# Patient Record
Sex: FEMALE | Race: WHITE | NOT HISPANIC OR LATINO | Employment: OTHER | URBAN - METROPOLITAN AREA
[De-identification: names, ages, dates, MRNs, and addresses within clinical notes are randomized per-mention and may not be internally consistent; named-entity substitution may affect disease eponyms.]

---

## 2018-09-10 ENCOUNTER — TELEPHONE (OUTPATIENT)
Dept: PULMONOLOGY | Facility: CLINIC | Age: 78
End: 2018-09-10

## 2018-09-10 NOTE — TELEPHONE ENCOUNTER
Pt scheduled for next day appt with MD .    ----- Message from Brittany Mcguire sent at 9/10/2018 12:40 PM CDT -----  Type: Needs appointment    Who Called:  Patient  Best Call Back Number: [phone]  Additional Information: Patient stated she use to see doctor at previous location/would like to see doctor again for COPD/no appointment showing available until December/stated needs to be seen sooner due to having issues/please call patient back to schedule or advise.

## 2018-09-11 ENCOUNTER — OFFICE VISIT (OUTPATIENT)
Dept: PULMONOLOGY | Facility: CLINIC | Age: 78
End: 2018-09-11
Payer: MEDICARE

## 2018-09-11 VITALS
HEIGHT: 62 IN | BODY MASS INDEX: 26.78 KG/M2 | OXYGEN SATURATION: 97 % | HEART RATE: 56 BPM | SYSTOLIC BLOOD PRESSURE: 164 MMHG | WEIGHT: 145.5 LBS | DIASTOLIC BLOOD PRESSURE: 75 MMHG

## 2018-09-11 DIAGNOSIS — J44.9 CHRONIC OBSTRUCTIVE PULMONARY DISEASE, UNSPECIFIED COPD TYPE: ICD-10-CM

## 2018-09-11 DIAGNOSIS — J44.1 COPD EXACERBATION: Primary | ICD-10-CM

## 2018-09-11 DIAGNOSIS — R06.09 DOE (DYSPNEA ON EXERTION): ICD-10-CM

## 2018-09-11 PROCEDURE — 99203 OFFICE O/P NEW LOW 30 MIN: CPT | Mod: S$PBB,,, | Performed by: INTERNAL MEDICINE

## 2018-09-11 PROCEDURE — 99213 OFFICE O/P EST LOW 20 MIN: CPT | Mod: PBBFAC,PO | Performed by: INTERNAL MEDICINE

## 2018-09-11 PROCEDURE — 99999 PR PBB SHADOW E&M-EST. PATIENT-LVL III: CPT | Mod: PBBFAC,,, | Performed by: INTERNAL MEDICINE

## 2018-09-11 RX ORDER — FUROSEMIDE 20 MG/1
20 TABLET ORAL DAILY
Status: ON HOLD | COMMUNITY
Start: 2018-09-04 | End: 2019-09-05 | Stop reason: HOSPADM

## 2018-09-11 RX ORDER — PANTOPRAZOLE SODIUM 40 MG/1
TABLET, DELAYED RELEASE ORAL
Refills: 3 | Status: ON HOLD | COMMUNITY
Start: 2018-08-17 | End: 2019-11-04 | Stop reason: HOSPADM

## 2018-09-11 RX ORDER — BUDESONIDE AND FORMOTEROL FUMARATE DIHYDRATE 160; 4.5 UG/1; UG/1
2 AEROSOL RESPIRATORY (INHALATION) EVERY 12 HOURS
Qty: 1 INHALER | Refills: 11 | Status: SHIPPED | OUTPATIENT
Start: 2018-09-11 | End: 2019-02-18 | Stop reason: SDUPTHER

## 2018-09-11 RX ORDER — LISINOPRIL 5 MG/1
5 TABLET ORAL DAILY
Status: ON HOLD | COMMUNITY
Start: 2018-09-07 | End: 2019-11-04 | Stop reason: HOSPADM

## 2018-09-11 RX ORDER — PRAVASTATIN SODIUM 20 MG/1
20 TABLET ORAL DAILY
Status: ON HOLD | COMMUNITY
Start: 2018-09-04 | End: 2019-11-04 | Stop reason: HOSPADM

## 2018-09-11 RX ORDER — ALBUTEROL SULFATE 0.83 MG/ML
SOLUTION RESPIRATORY (INHALATION)
Refills: 1 | COMMUNITY
Start: 2018-07-02 | End: 2018-09-11 | Stop reason: SDUPTHER

## 2018-09-11 RX ORDER — PREDNISONE 20 MG/1
TABLET ORAL
Qty: 12 TABLET | Refills: 0 | Status: SHIPPED | OUTPATIENT
Start: 2018-09-11 | End: 2018-09-11 | Stop reason: SDUPTHER

## 2018-09-11 RX ORDER — ALBUTEROL SULFATE 90 UG/1
1-2 AEROSOL, METERED RESPIRATORY (INHALATION) EVERY 4 HOURS PRN
Qty: 1 INHALER | Refills: 11 | Status: SHIPPED | OUTPATIENT
Start: 2018-09-11 | End: 2019-02-18 | Stop reason: SDUPTHER

## 2018-09-11 RX ORDER — LEVOTHYROXINE SODIUM 75 UG/1
75 TABLET ORAL
Status: ON HOLD | COMMUNITY
Start: 2018-09-10 | End: 2019-06-04 | Stop reason: HOSPADM

## 2018-09-11 RX ORDER — SOTALOL HYDROCHLORIDE 80 MG/1
TABLET ORAL
Refills: 1 | Status: ON HOLD | COMMUNITY
Start: 2018-07-14 | End: 2019-09-05 | Stop reason: SDUPTHER

## 2018-09-11 RX ORDER — ALBUTEROL SULFATE 0.83 MG/ML
2.5 SOLUTION RESPIRATORY (INHALATION) EVERY 4 HOURS PRN
Qty: 120 EACH | Refills: 11 | Status: SHIPPED | OUTPATIENT
Start: 2018-09-11 | End: 2019-02-18 | Stop reason: SDUPTHER

## 2018-09-11 RX ORDER — PREDNISONE 20 MG/1
TABLET ORAL
Qty: 12 TABLET | Refills: 2 | Status: SHIPPED | OUTPATIENT
Start: 2018-09-11 | End: 2019-05-07

## 2018-09-11 RX ORDER — TRAMADOL HYDROCHLORIDE 50 MG/1
50 TABLET ORAL EVERY 12 HOURS PRN
Refills: 3 | COMMUNITY
Start: 2018-08-23 | End: 2019-10-23 | Stop reason: SDUPTHER

## 2018-09-11 RX ORDER — ATENOLOL 50 MG/1
50 TABLET ORAL DAILY
Refills: 1 | Status: ON HOLD | COMMUNITY
Start: 2018-07-14 | End: 2019-09-05 | Stop reason: HOSPADM

## 2018-09-11 RX ORDER — ALBUTEROL SULFATE 90 UG/1
AEROSOL, METERED RESPIRATORY (INHALATION)
COMMUNITY
End: 2018-09-11 | Stop reason: SDUPTHER

## 2018-09-11 RX ORDER — CLONAZEPAM 0.5 MG/1
TABLET ORAL
Refills: 3 | COMMUNITY
Start: 2018-08-23

## 2018-09-11 NOTE — PATIENT INSTRUCTIONS
Copd    Use prednisone- daily for 3 days, may repeat for wheezes.     Use symbicort 2 twice daily , may use regular       Use albuterol inhaler or nebulizer as needed.      Check 02 level and breathing test      Try spiriva 2 daily in a month. Do once breathing good from steroids.

## 2018-09-11 NOTE — PROGRESS NOTES
"2018    Rosita Almodovar  Seen in past in     Chief Complaint   Patient presents with    Shortness of Breath    COPD       HPI:   Quite smoking 2014 after 54 years- pt initially seen as stopped breathing - spent 4 days total- had angiogram at that time.      Breathing had been stable but began worsening - developed cough productive nothing, dry, having wheezes now - had in past but stopped with smoking cessation.  .        The chief compliant  problem is varies with instablilty at time   PFSH:  History reviewed. No pertinent past medical history.      History reviewed. No pertinent surgical history.  Social History     Tobacco Use    Smoking status: Former Smoker     Packs/day: 1.00     Years: 54.00     Pack years: 54.00     Types: Cigarettes     Last attempt to quit:      Years since quittin.6    Smokeless tobacco: Never Used   Substance Use Topics    Alcohol use: No     Frequency: Never    Drug use: No     History reviewed. No pertinent family history.  Review of patient's allergies indicates:  No Known Allergies    Performance Status:The patient's activity level is housebound activities.      Review of Systems:  a review of eleven systems covering constitutional, Eye, HEENT, Psych, Respiratory, Cardiac, GI, , Musculoskeletal, Endocrine, Dermatologic was negative except for pertinent findings as listed ABOVE and below:  pertinent positive as above, rest is good , [place], sees cardiology.     Exam:Comprehensive exam done. BP (!) 164/75 (BP Location: Left arm, Patient Position: Sitting)   Pulse (!) 56   Ht 5' 1.5" (1.562 m)   Wt 66 kg (145 lb 8.1 oz)   SpO2 97% Comment: on room air  BMI 27.05 kg/m²   Exam included Vitals as listed, and patient's appearance and affect and alertness and mood, oral exam for yeast and hygiene and pharynx lesions and Mallapatti (M) score, neck with inspection for jvd and masses and thyroid abnormalities and lymph nodes (supraclavicular and infraclavicular nodes " and axillary also examined and noted if abn), chest exam included symmetry and effort and fremitus and percussion and auscultation, cardiac exam included rhythm and gallops and murmur and rubs and jvd and edema, abdominal exam for mass and hepatosplenomegaly and tenderness and hernias and bowel sounds, Musculoskeletal exam with muscle tone and posture and mobility/gait and  strength, and skin for rashes and cyanosis and pallor and turgor, extremity for clubbing.  Findings were normal except for pertinent findings listed below:  M1, chest is symmetric, no distress, normal percussion, normal fremitus and good normal breath sounds  No edema    Radiographs (ct chest and cxr) reviewed: view by direct vision  Copd changes July 2018.prominent vasc markings.    Labs was not done        PFT will be done and results to be reviewed       Plan:  Clinical impression is apparently straight forward and impression with management as below.    Rosita was seen today for shortness of breath and copd.    Diagnoses and all orders for this visit:    COPD exacerbation  -     Discontinue: predniSONE (DELTASONE) 20 MG tablet; One daily for 3 days and repeat for flare of lung symptoms as intructed  -     budesonide-formoterol 160-4.5 mcg (SYMBICORT) 160-4.5 mcg/actuation HFAA; Inhale 2 puffs into the lungs every 12 (twelve) hours. Controller  -     Complete PFT with bronchodilator; Future  -     Six Minute Walk Test to qualify for Home Oxygen; Future  -     PULSE OXIMETRY OVERNIGHT  -     albuterol (PROVENTIL) 2.5 mg /3 mL (0.083 %) nebulizer solution; Take 3 mLs (2.5 mg total) by nebulization every 4 (four) hours as needed for Wheezing. Rescue  -     albuterol (VENTOLIN HFA) 90 mcg/actuation inhaler; Inhale 1-2 puffs into the lungs every 4 (four) hours as needed for Wheezing. Rescue  -     predniSONE (DELTASONE) 20 MG tablet; One daily for 3 days and repeat for flare of lung symptoms as intructed  -     tiotropium bromide 2.5  mcg/actuation Mist; Inhale 5 mcg into the lungs once daily. Controller    RIVAS (dyspnea on exertion)  -     Discontinue: predniSONE (DELTASONE) 20 MG tablet; One daily for 3 days and repeat for flare of lung symptoms as intructed  -     budesonide-formoterol 160-4.5 mcg (SYMBICORT) 160-4.5 mcg/actuation HFAA; Inhale 2 puffs into the lungs every 12 (twelve) hours. Controller  -     Complete PFT with bronchodilator; Future  -     Six Minute Walk Test to qualify for Home Oxygen; Future  -     PULSE OXIMETRY OVERNIGHT  -     predniSONE (DELTASONE) 20 MG tablet; One daily for 3 days and repeat for flare of lung symptoms as intructed    Chronic obstructive pulmonary disease, unspecified COPD type   -     PULSE OXIMETRY OVERNIGHT  -     albuterol (PROVENTIL) 2.5 mg /3 mL (0.083 %) nebulizer solution; Take 3 mLs (2.5 mg total) by nebulization every 4 (four) hours as needed for Wheezing. Rescue  -     albuterol (VENTOLIN HFA) 90 mcg/actuation inhaler; Inhale 1-2 puffs into the lungs every 4 (four) hours as needed for Wheezing. Rescue  -     tiotropium bromide 2.5 mcg/actuation Mist; Inhale 5 mcg into the lungs once daily. Controller        Follow-up in about 3 months (around 12/11/2018).    Discussed with patient above for education the following:      Patient Instructions   Copd    Use prednisone- daily for 3 days, may repeat for wheezes.     Use symbicort 2 twice daily , may use regular       Use albuterol inhaler or nebulizer as needed.      Check 02 level and breathing test      Try spiriva 2 daily in a month. Do once breathing good from steroids.

## 2018-09-24 DIAGNOSIS — J44.1 COPD EXACERBATION: Primary | ICD-10-CM

## 2018-09-24 DIAGNOSIS — J44.9 CHRONIC OBSTRUCTIVE PULMONARY DISEASE, UNSPECIFIED COPD TYPE: ICD-10-CM

## 2019-02-18 ENCOUNTER — OFFICE VISIT (OUTPATIENT)
Dept: PULMONOLOGY | Facility: CLINIC | Age: 79
End: 2019-02-18
Payer: MEDICARE

## 2019-02-18 VITALS
DIASTOLIC BLOOD PRESSURE: 61 MMHG | SYSTOLIC BLOOD PRESSURE: 125 MMHG | HEIGHT: 62 IN | BODY MASS INDEX: 26.05 KG/M2 | WEIGHT: 141.56 LBS | OXYGEN SATURATION: 96 % | HEART RATE: 63 BPM

## 2019-02-18 DIAGNOSIS — J44.9 CHRONIC OBSTRUCTIVE PULMONARY DISEASE, UNSPECIFIED COPD TYPE: ICD-10-CM

## 2019-02-18 DIAGNOSIS — J44.1 COPD EXACERBATION: Primary | ICD-10-CM

## 2019-02-18 DIAGNOSIS — R06.09 DOE (DYSPNEA ON EXERTION): ICD-10-CM

## 2019-02-18 PROCEDURE — 99999 PR PBB SHADOW E&M-EST. PATIENT-LVL IV: ICD-10-PCS | Mod: PBBFAC,,, | Performed by: INTERNAL MEDICINE

## 2019-02-18 PROCEDURE — 99999 PR PBB SHADOW E&M-EST. PATIENT-LVL IV: CPT | Mod: PBBFAC,,, | Performed by: INTERNAL MEDICINE

## 2019-02-18 PROCEDURE — 99214 OFFICE O/P EST MOD 30 MIN: CPT | Mod: PBBFAC,PO | Performed by: INTERNAL MEDICINE

## 2019-02-18 PROCEDURE — 99213 PR OFFICE/OUTPT VISIT, EST, LEVL III, 20-29 MIN: ICD-10-PCS | Mod: S$PBB,,, | Performed by: INTERNAL MEDICINE

## 2019-02-18 PROCEDURE — 99213 OFFICE O/P EST LOW 20 MIN: CPT | Mod: S$PBB,,, | Performed by: INTERNAL MEDICINE

## 2019-02-18 RX ORDER — ALBUTEROL SULFATE 0.83 MG/ML
2.5 SOLUTION RESPIRATORY (INHALATION) EVERY 4 HOURS PRN
Qty: 120 EACH | Refills: 11 | Status: SHIPPED | OUTPATIENT
Start: 2019-02-18 | End: 2019-05-14

## 2019-02-18 RX ORDER — CHOLECALCIFEROL (VITAMIN D3) 1250 MCG
50000 CAPSULE ORAL
Refills: 3 | Status: ON HOLD | COMMUNITY
Start: 2019-01-31 | End: 2019-11-04 | Stop reason: HOSPADM

## 2019-02-18 RX ORDER — BUDESONIDE AND FORMOTEROL FUMARATE DIHYDRATE 160; 4.5 UG/1; UG/1
2 AEROSOL RESPIRATORY (INHALATION) EVERY 12 HOURS
Qty: 1 INHALER | Refills: 11 | Status: SHIPPED | OUTPATIENT
Start: 2019-02-18 | End: 2019-10-17

## 2019-02-18 RX ORDER — ALBUTEROL SULFATE 90 UG/1
1-2 AEROSOL, METERED RESPIRATORY (INHALATION) EVERY 4 HOURS PRN
Qty: 1 INHALER | Refills: 11 | Status: SHIPPED | OUTPATIENT
Start: 2019-02-18 | End: 2019-05-14

## 2019-02-18 NOTE — PATIENT INSTRUCTIONS
Copd is mild range with lung capacity 63-69%     symbicort once or twice daily and nebulizer as needed/bedtime.    May use prednisone if more cough/wheezes.      X rays last yr were good- should do chest xray if lungs worsen.    Stress test for heart set up for April- re check one yr.

## 2019-02-18 NOTE — PROGRESS NOTES
2019    Rositalino Iqbal Scooby  Seen in past in     Chief Complaint   Patient presents with    Follow-up     3 month     COPD       2019 - seen in [facility] yrs ago.  Off esmokes, doing well , uses symbicort q am, uses nebulizer prn and nightly - no extra.  Took prednisone only once.  Used spiriva - not very effective.    Quite smoking 2014 after 54 years- pt initially seen as stopped breathing - spent 4 days total- had angiogram at that time.      Breathing had been stable but began worsening - developed cough productive nothing, dry, having wheezes now - had in past but stopped with smoking cessation.  .    Patient Instructions   Copd    Use prednisone- daily for 3 days, may repeat for wheezes.     Use symbicort 2 twice daily , may use regular       Use albuterol inhaler or nebulizer as needed.      Check 02 level and breathing test      Try spiriva 2 daily in a month. Do once breathing good from steroids.     The chief compliant  problem is varies with instablilty at time   PFSH:  History reviewed. No pertinent past medical history.      History reviewed. No pertinent surgical history.  Social History     Tobacco Use    Smoking status: Former Smoker     Packs/day: 1.00     Years: 54.00     Pack years: 54.00     Types: Cigarettes     Last attempt to quit:      Years since quittin.1    Smokeless tobacco: Never Used   Substance Use Topics    Alcohol use: No     Frequency: Never    Drug use: No     History reviewed. No pertinent family history.  Review of patient's allergies indicates:  No Known Allergies    Performance Status:The patient's activity level is housebound activities.      Review of Systems:  a review of eleven systems covering constitutional, Eye, HEENT, Psych, Respiratory, Cardiac, GI, , Musculoskeletal, Endocrine, Dermatologic was negative except for pertinent findings as listed ABOVE and below:  pertinent positive as above, rest is good , [place], sees cardiology.  "    Exam:Comprehensive exam done. /61 (BP Location: Right arm, Patient Position: Sitting)   Pulse 63   Ht 5' 1.5" (1.562 m)   Wt 64.2 kg (141 lb 8.6 oz)   SpO2 96% Comment: on room air  BMI 26.31 kg/m²   Exam included Vitals as listed, and patient's appearance and affect and alertness and mood, oral exam for yeast and hygiene and pharynx lesions and Mallapatti (M) score, neck with inspection for jvd and masses and thyroid abnormalities and lymph nodes (supraclavicular and infraclavicular nodes and axillary also examined and noted if abn), chest exam included symmetry and effort and fremitus and percussion and auscultation, cardiac exam included rhythm and gallops and murmur and rubs and jvd and edema, abdominal exam for mass and hepatosplenomegaly and tenderness and hernias and bowel sounds, Musculoskeletal exam with muscle tone and posture and mobility/gait and  strength, and skin for rashes and cyanosis and pallor and turgor, extremity for clubbing.  Findings were normal except for pertinent findings listed below:  M1, chest is symmetric, no distress, normal percussion, normal fremitus and good normal breath sounds  No edema    Radiographs (ct chest and cxr) reviewed: view by direct vision  Copd changes July 2018.prominent vasc markings. 9/27/2018 epa cxr with vague shadow lateral chest wall.       FINDINGS:  The lungs are clear, with normal appearance of pulmonary vasculature and no pleural effusion or pneumothorax.    The cardiac silhouette is normal in size.  The aorta is mildly ectatic, stable.  The hilar and mediastinal contours are unremarkable.    Degenerative disc changes and scoliosis redemonstrated in the spine.      Impression       1. No cardiopulmonary abnormality identified.  2. Stable chronic findings of the aorta and bony structures.      Electronically signed by: Javad Kendall II, MD  Date: 09/27/2018  Time: 09:40       Labs was not done        PFTfev1 63-69 % in 9/20/18,  rv " 138%      Plan:  Clinical impression is apparently straight forward and impression with management as below.    Rosita was seen today for follow-up and copd.    Diagnoses and all orders for this visit:    COPD exacerbation  -     budesonide-formoterol 160-4.5 mcg (SYMBICORT) 160-4.5 mcg/actuation HFAA; Inhale 2 puffs into the lungs every 12 (twelve) hours. Controller  -     albuterol (PROVENTIL) 2.5 mg /3 mL (0.083 %) nebulizer solution; Take 3 mLs (2.5 mg total) by nebulization every 4 (four) hours as needed for Wheezing. Rescue  -     albuterol (VENTOLIN HFA) 90 mcg/actuation inhaler; Inhale 1-2 puffs into the lungs every 4 (four) hours as needed for Wheezing. Rescue    RIVAS (dyspnea on exertion)  -     budesonide-formoterol 160-4.5 mcg (SYMBICORT) 160-4.5 mcg/actuation HFAA; Inhale 2 puffs into the lungs every 12 (twelve) hours. Controller    Chronic obstructive pulmonary disease, unspecified COPD type   -     albuterol (PROVENTIL) 2.5 mg /3 mL (0.083 %) nebulizer solution; Take 3 mLs (2.5 mg total) by nebulization every 4 (four) hours as needed for Wheezing. Rescue  -     albuterol (VENTOLIN HFA) 90 mcg/actuation inhaler; Inhale 1-2 puffs into the lungs every 4 (four) hours as needed for Wheezing. Rescue        Follow-up in about 1 year (around 2/18/2020), or if symptoms worsen or fail to improve.    Discussed with patient above for education the following:      Patient Instructions   Copd is mild range with lung capacity 63-69%     symbicort once or twice daily and nebulizer as needed/bedtime.    May use prednisone if more cough/wheezes.      X rays last yr were good- should do chest xray if lungs worsen.    Stress test for heart set up for April- re check one yr.     yes

## 2019-05-08 PROBLEM — E07.9 THYROID DISEASE: Chronic | Status: ACTIVE | Noted: 2019-05-08
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
## 2019-05-08 PROBLEM — J44.9 COPD (CHRONIC OBSTRUCTIVE PULMONARY DISEASE): Chronic | Status: ACTIVE | Noted: 2019-05-08

## 2019-05-08 PROBLEM — I63.9 CVA (CEREBRAL VASCULAR ACCIDENT): Status: ACTIVE | Noted: 2019-05-08

## 2019-05-08 PROBLEM — R63.0 ANOREXIA: Status: ACTIVE | Noted: 2019-05-08
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
## 2019-05-08 PROBLEM — F41.9 ANXIETY: Chronic | Status: ACTIVE | Noted: 2019-05-08

## 2019-05-08 PROBLEM — E86.0 DEHYDRATION: Status: ACTIVE | Noted: 2019-05-08

## 2019-05-08 PROBLEM — G45.9 TIA (TRANSIENT ISCHEMIC ATTACK): Status: ACTIVE | Noted: 2019-05-08

## 2019-05-08 PROBLEM — N17.9 AKI (ACUTE KIDNEY INJURY): Status: ACTIVE | Noted: 2019-05-08

## 2019-05-09 PROBLEM — R27.0 ATAXIA: Status: ACTIVE | Noted: 2019-05-09

## 2019-05-09 PROBLEM — J44.1 COPD EXACERBATION: Status: RESOLVED | Noted: 2018-09-11 | Resolved: 2019-05-09

## 2019-05-09 PROBLEM — G93.89 MASS OF BRAIN: Status: ACTIVE | Noted: 2019-05-09

## 2019-05-09 PROBLEM — R06.09 DOE (DYSPNEA ON EXERTION): Status: RESOLVED | Noted: 2018-09-11 | Resolved: 2019-05-09

## 2019-05-09 PROBLEM — E03.9 ACQUIRED HYPOTHYROIDISM: Status: ACTIVE | Noted: 2019-05-08

## 2019-05-09 PROBLEM — R47.1 DYSARTHRIA: Status: ACTIVE | Noted: 2019-05-09

## 2019-05-09 NOTE — PLAN OF CARE
[facility] admissions ONLY: Please call extension [phone] upon patient arrival to floor for Hospital Medicine admit team assignment and for additional admit orders for the patient. Do not page the attending, staff physician or Advanced Practice Provider with the patient on arrival (may not be in-house at the time of arrival). Call back or wait to leave beeper number when prompted.     (Physician in Lead of Transfers) /Regional Referral Center Note      Patients name/MRN: Rosita Almodovar/MRN [id number]     Referring Physician or Mid-Level provider giving report: Dr. Candi Ivy (Neurology)  Contact number: [phone]    Referral Facility: [facility]     Date/Time of Acceptance: 5/9/2019 6:58 PM    : Marilu Naidu MD     Accepting facility: [facility] Med/Surg    Reason for transfer request: No Oncology or radiation Oncology or Neurosurgery available at [place]. 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 Patient started using walker 1 month ago due to gait instability. Patient also reported 3 months of anorexia and weight loss of 22 lbs. Concern for CVA on admit so Neurology consulted for evaluation. Patient also noted to have mild YAKOV on admit 15/1.4 and hypotension with SBP in 90s. Patient started on IVFs and home medication of Lisinopril held. U/A negative for infection. 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 No other areas of acute infarction were appreciated. There was also noted to be a 1.4 cm rounded/ring like focus of abnormal signal cerebellum on the left with details and signal characteristics as discussed  "above not typical for recent hemorrhage or acute infarction. Repeat MRI of brain with contrast done today, 5/9 showed ring enhancement of previously demonstrated left cerebellar lesion and ring enhancement of demonstrated small lesion with diffusion restriction posteromedial left frontal cortex as seen on the prior MR images.  Given multiplicity of parenchymal lesions possibility of metastatic disease is raised. There was also appearance of the calvarial marrow is evident on the non-contrast T1 W images of 05/08/2019 as well as the current postcontrast images with more pronounced diminished T1 marrow signal right parietal region with overlying scalp soft tissue mass at the posterior right parietal convexity which demonstrates mild enhancement.  Focal T1 hypointense lesion also suspected at the C2 level.  As such the possibility of a marrow replacement disorder is raised including possibility of osseous metastases. Renal ultrasound done on this admit was unremarkable. Carotid US showed no significant stenosis. Concern for metastatic cancer and no Oncology, Rad/Onc or Neurosurgery available at [facility]. Case Discussed with Dr. Ivy and states patient is awake and alert and oriented x 3. Patient with no focal neuro deficits just mild dysarthria. No edema on MRI of brain so steroids not started.     VS: BP 95/49  Pulse 72  Temp 97.4 °F (36.3 °C) (Oral)   Resp 18   Ht 5' 1.5" (1.562 m)   Wt 59.3 kg (130 lb 11.7 oz)   SpO2 97% on room air  BMI 24.30 kg/m²    Past Medical/Surgical History: See EPIC    Meds: See EPIC    Labs: See EPIC    Imaging: See EPIC    To Do List upon arrival:     CT scan of chest/abdomen and pelvis for metastatic work-up   Likely biopsy once primary lesion found to establish diagnosis     Marilu Naidu MD  Senior Staff Physician  Department of Hospital Medicine  Patient [facility]/   [phone]  "

## 2019-05-10 ENCOUNTER — HOSPITAL ENCOUNTER (INPATIENT)
Facility: HOSPITAL | Age: 79
LOS: 1 days | Discharge: HOME | DRG: 055 | End: 2019-05-11
Attending: INTERNAL MEDICINE | Admitting: INTERNAL MEDICINE
Payer: MEDICARE

## 2019-05-10 DIAGNOSIS — N17.9 AKI (ACUTE KIDNEY INJURY): ICD-10-CM

## 2019-05-10 DIAGNOSIS — R27.0 ATAXIA: ICD-10-CM

## 2019-05-10 DIAGNOSIS — R91.8 ABNORMAL CT SCAN, LUNG: Primary | ICD-10-CM

## 2019-05-10 DIAGNOSIS — G93.89 CEREBELLAR MASS: ICD-10-CM

## 2019-05-10 DIAGNOSIS — I10 ESSENTIAL HYPERTENSION: ICD-10-CM

## 2019-05-10 DIAGNOSIS — R63.0 ANOREXIA: ICD-10-CM

## 2019-05-10 DIAGNOSIS — E03.9 ACQUIRED HYPOTHYROIDISM: ICD-10-CM

## 2019-05-10 DIAGNOSIS — G93.89 BRAIN MASS: ICD-10-CM

## 2019-05-10 PROBLEM — K21.9 GERD (GASTROESOPHAGEAL REFLUX DISEASE): Status: ACTIVE | Noted: 2019-05-10

## 2019-05-10 LAB
ALBUMIN SERPL BCP-MCNC: 2.8 G/DL (ref 3.5–5.2)
ALP SERPL-CCNC: 120 U/L (ref 55–135)
ALT SERPL W/O P-5'-P-CCNC: 6 U/L (ref 10–44)
ANION GAP SERPL CALC-SCNC: 11 MMOL/L (ref 8–16)
AST SERPL-CCNC: 28 U/L (ref 10–40)
BASOPHILS # BLD AUTO: 0.04 K/UL (ref 0–0.2)
BASOPHILS NFR BLD: 0.5 % (ref 0–1.9)
BILIRUB SERPL-MCNC: 0.3 MG/DL (ref 0.1–1)
BILIRUB UR QL STRIP: NEGATIVE
BUN SERPL-MCNC: 13 MG/DL (ref 8–23)
CALCIUM SERPL-MCNC: 9.2 MG/DL (ref 8.7–10.5)
CHLORIDE SERPL-SCNC: 102 MMOL/L (ref 95–110)
CLARITY UR REFRACT.AUTO: CLEAR
CO2 SERPL-SCNC: 25 MMOL/L (ref 23–29)
COLOR UR AUTO: NORMAL
CREAT SERPL-MCNC: 1 MG/DL (ref 0.5–1.4)
DIFFERENTIAL METHOD: ABNORMAL
EOSINOPHIL # BLD AUTO: 0.1 K/UL (ref 0–0.5)
EOSINOPHIL NFR BLD: 0.8 % (ref 0–8)
ERYTHROCYTE [DISTWIDTH] IN BLOOD BY AUTOMATED COUNT: 13.2 % (ref 11.5–14.5)
EST. GFR  (AFRICAN AMERICAN): >60 ML/MIN/1.73 M^2
EST. GFR  (NON AFRICAN AMERICAN): 54.5 ML/MIN/1.73 M^2
ESTIMATED AVG GLUCOSE: 117 MG/DL (ref 68–131)
GLUCOSE SERPL-MCNC: 74 MG/DL (ref 70–110)
GLUCOSE UR QL STRIP: NEGATIVE
HBA1C MFR BLD HPLC: 5.7 % (ref 4–5.6)
HCT VFR BLD AUTO: 36 % (ref 37–48.5)
HGB BLD-MCNC: 11.7 G/DL (ref 12–16)
HGB UR QL STRIP: NEGATIVE
HIV 1+2 AB+HIV1 P24 AG SERPL QL IA: NEGATIVE
IMM GRANULOCYTES # BLD AUTO: 0.06 K/UL (ref 0–0.04)
IMM GRANULOCYTES NFR BLD AUTO: 0.8 % (ref 0–0.5)
INR PPP: 1 (ref 0.8–1.2)
KETONES UR QL STRIP: NEGATIVE
LEUKOCYTE ESTERASE UR QL STRIP: NEGATIVE
LYMPHOCYTES # BLD AUTO: 1.4 K/UL (ref 1–4.8)
LYMPHOCYTES NFR BLD: 17.7 % (ref 18–48)
MAGNESIUM SERPL-MCNC: 1.3 MG/DL (ref 1.6–2.6)
MCH RBC QN AUTO: 28.6 PG (ref 27–31)
MCHC RBC AUTO-ENTMCNC: 32.5 G/DL (ref 32–36)
MCV RBC AUTO: 88 FL (ref 82–98)
MONOCYTES # BLD AUTO: 0.6 K/UL (ref 0.3–1)
MONOCYTES NFR BLD: 7 % (ref 4–15)
NEUTROPHILS # BLD AUTO: 5.8 K/UL (ref 1.8–7.7)
NEUTROPHILS NFR BLD: 73.2 % (ref 38–73)
NITRITE UR QL STRIP: NEGATIVE
NRBC BLD-RTO: 0 /100 WBC
PH UR STRIP: 6 [PH] (ref 5–8)
PHOSPHATE SERPL-MCNC: 2.1 MG/DL (ref 2.7–4.5)
PLATELET # BLD AUTO: 382 K/UL (ref 150–350)
PMV BLD AUTO: 10.1 FL (ref 9.2–12.9)
POTASSIUM SERPL-SCNC: 3.3 MMOL/L (ref 3.5–5.1)
PROT SERPL-MCNC: 5.7 G/DL (ref 6–8.4)
PROT UR QL STRIP: NEGATIVE
PROTHROMBIN TIME: 10.2 SEC (ref 9–12.5)
RBC # BLD AUTO: 4.09 M/UL (ref 4–5.4)
SODIUM SERPL-SCNC: 138 MMOL/L (ref 136–145)
SP GR UR STRIP: 1.03 (ref 1–1.03)
URN SPEC COLLECT METH UR: NORMAL
WBC # BLD AUTO: 7.86 K/UL (ref 3.9–12.7)

## 2019-05-10 PROCEDURE — 25000003 PHARM REV CODE 250: Performed by: STUDENT IN AN ORGANIZED HEALTH CARE EDUCATION/TRAINING PROGRAM

## 2019-05-10 PROCEDURE — 80053 COMPREHEN METABOLIC PANEL: CPT

## 2019-05-10 PROCEDURE — 84165 PROTEIN E-PHORESIS SERUM: CPT

## 2019-05-10 PROCEDURE — 86334 PATHOLOGIST INTERPRETATION IFE: ICD-10-PCS | Mod: 26,,, | Performed by: PATHOLOGY

## 2019-05-10 PROCEDURE — 25500020 PHARM REV CODE 255: Performed by: INTERNAL MEDICINE

## 2019-05-10 PROCEDURE — 25000003 PHARM REV CODE 250: Performed by: INTERNAL MEDICINE

## 2019-05-10 PROCEDURE — 83036 HEMOGLOBIN GLYCOSYLATED A1C: CPT

## 2019-05-10 PROCEDURE — 20600001 HC STEP DOWN PRIVATE ROOM

## 2019-05-10 PROCEDURE — 63600175 PHARM REV CODE 636 W HCPCS: Performed by: INTERNAL MEDICINE

## 2019-05-10 PROCEDURE — 99233 SBSQ HOSP IP/OBS HIGH 50: CPT | Mod: GC,,, | Performed by: INTERNAL MEDICINE

## 2019-05-10 PROCEDURE — 86334 IMMUNOFIX E-PHORESIS SERUM: CPT | Mod: 26,,, | Performed by: PATHOLOGY

## 2019-05-10 PROCEDURE — 81003 URINALYSIS AUTO W/O SCOPE: CPT

## 2019-05-10 PROCEDURE — 97161 PT EVAL LOW COMPLEX 20 MIN: CPT

## 2019-05-10 PROCEDURE — 25500020 PHARM REV CODE 255: Performed by: STUDENT IN AN ORGANIZED HEALTH CARE EDUCATION/TRAINING PROGRAM

## 2019-05-10 PROCEDURE — 99233 PR SUBSEQUENT HOSPITAL CARE,LEVL III: ICD-10-PCS | Mod: GC,,, | Performed by: INTERNAL MEDICINE

## 2019-05-10 PROCEDURE — 83735 ASSAY OF MAGNESIUM: CPT

## 2019-05-10 PROCEDURE — 84165 PROTEIN E-PHORESIS SERUM: CPT | Mod: 26,,, | Performed by: PATHOLOGY

## 2019-05-10 PROCEDURE — 85025 COMPLETE CBC W/AUTO DIFF WBC: CPT

## 2019-05-10 PROCEDURE — 84100 ASSAY OF PHOSPHORUS: CPT

## 2019-05-10 PROCEDURE — 85610 PROTHROMBIN TIME: CPT

## 2019-05-10 PROCEDURE — 36415 COLL VENOUS BLD VENIPUNCTURE: CPT

## 2019-05-10 PROCEDURE — 86334 IMMUNOFIX E-PHORESIS SERUM: CPT

## 2019-05-10 PROCEDURE — 84165 PATHOLOGIST INTERPRETATION SPE: ICD-10-PCS | Mod: 26,,, | Performed by: PATHOLOGY

## 2019-05-10 PROCEDURE — 86777 TOXOPLASMA ANTIBODY: CPT

## 2019-05-10 PROCEDURE — 86703 HIV-1/HIV-2 1 RESULT ANTBDY: CPT

## 2019-05-10 PROCEDURE — 99223 1ST HOSP IP/OBS HIGH 75: CPT | Mod: ,,, | Performed by: PHYSICIAN ASSISTANT

## 2019-05-10 PROCEDURE — 99223 PR INITIAL HOSPITAL CARE,LEVL III: ICD-10-PCS | Mod: ,,, | Performed by: PHYSICIAN ASSISTANT

## 2019-05-10 RX ORDER — GLUCAGON 1 MG
1 KIT INJECTION
Status: DISCONTINUED | OUTPATIENT
Start: 2019-05-10 | End: 2019-05-11 | Stop reason: HOSPADM

## 2019-05-10 RX ORDER — PRAVASTATIN SODIUM 20 MG/1
20 TABLET ORAL DAILY
Status: DISCONTINUED | OUTPATIENT
Start: 2019-05-10 | End: 2019-05-11 | Stop reason: HOSPADM

## 2019-05-10 RX ORDER — FLUTICASONE FUROATE AND VILANTEROL 100; 25 UG/1; UG/1
1 POWDER RESPIRATORY (INHALATION) DAILY
Status: DISCONTINUED | OUTPATIENT
Start: 2019-05-10 | End: 2019-05-11 | Stop reason: HOSPADM

## 2019-05-10 RX ORDER — LISINOPRIL 5 MG/1
5 TABLET ORAL DAILY
Status: DISCONTINUED | OUTPATIENT
Start: 2019-05-10 | End: 2019-05-10

## 2019-05-10 RX ORDER — MAGNESIUM SULFATE HEPTAHYDRATE 40 MG/ML
2 INJECTION, SOLUTION INTRAVENOUS ONCE
Status: COMPLETED | OUTPATIENT
Start: 2019-05-10 | End: 2019-05-10

## 2019-05-10 RX ORDER — TIOTROPIUM BROMIDE 18 UG/1
1 CAPSULE ORAL; RESPIRATORY (INHALATION) DAILY
Status: DISCONTINUED | OUTPATIENT
Start: 2019-05-10 | End: 2019-05-11 | Stop reason: HOSPADM

## 2019-05-10 RX ORDER — PANTOPRAZOLE SODIUM 40 MG/1
40 TABLET, DELAYED RELEASE ORAL DAILY
Status: DISCONTINUED | OUTPATIENT
Start: 2019-05-10 | End: 2019-05-11 | Stop reason: HOSPADM

## 2019-05-10 RX ORDER — ATENOLOL 50 MG/1
50 TABLET ORAL DAILY
Status: DISCONTINUED | OUTPATIENT
Start: 2019-05-10 | End: 2019-05-11 | Stop reason: HOSPADM

## 2019-05-10 RX ORDER — SODIUM CHLORIDE 0.9 % (FLUSH) 0.9 %
10 SYRINGE (ML) INJECTION
Status: DISCONTINUED | OUTPATIENT
Start: 2019-05-10 | End: 2019-05-11 | Stop reason: HOSPADM

## 2019-05-10 RX ORDER — LEVOTHYROXINE SODIUM 75 UG/1
75 TABLET ORAL
Status: DISCONTINUED | OUTPATIENT
Start: 2019-05-10 | End: 2019-05-11 | Stop reason: HOSPADM

## 2019-05-10 RX ORDER — IPRATROPIUM BROMIDE AND ALBUTEROL SULFATE 2.5; .5 MG/3ML; MG/3ML
3 SOLUTION RESPIRATORY (INHALATION) EVERY 4 HOURS PRN
Status: DISCONTINUED | OUTPATIENT
Start: 2019-05-10 | End: 2019-05-11 | Stop reason: HOSPADM

## 2019-05-10 RX ORDER — IBUPROFEN 200 MG
24 TABLET ORAL
Status: DISCONTINUED | OUTPATIENT
Start: 2019-05-10 | End: 2019-05-11 | Stop reason: HOSPADM

## 2019-05-10 RX ORDER — CLONAZEPAM 0.5 MG/1
0.5 TABLET ORAL 3 TIMES DAILY PRN
Status: DISCONTINUED | OUTPATIENT
Start: 2019-05-10 | End: 2019-05-11 | Stop reason: HOSPADM

## 2019-05-10 RX ORDER — ONDANSETRON 8 MG/1
8 TABLET, ORALLY DISINTEGRATING ORAL EVERY 8 HOURS PRN
Status: DISCONTINUED | OUTPATIENT
Start: 2019-05-10 | End: 2019-05-11 | Stop reason: HOSPADM

## 2019-05-10 RX ORDER — ACETAMINOPHEN 325 MG/1
650 TABLET ORAL EVERY 4 HOURS PRN
Status: DISCONTINUED | OUTPATIENT
Start: 2019-05-10 | End: 2019-05-11 | Stop reason: HOSPADM

## 2019-05-10 RX ORDER — IBUPROFEN 200 MG
16 TABLET ORAL
Status: DISCONTINUED | OUTPATIENT
Start: 2019-05-10 | End: 2019-05-11 | Stop reason: HOSPADM

## 2019-05-10 RX ORDER — TRAMADOL HYDROCHLORIDE 50 MG/1
50 TABLET ORAL EVERY 8 HOURS PRN
Status: DISCONTINUED | OUTPATIENT
Start: 2019-05-10 | End: 2019-05-11 | Stop reason: HOSPADM

## 2019-05-10 RX ADMIN — IOHEXOL 15 ML: 350 INJECTION, SOLUTION INTRAVENOUS at 11:05

## 2019-05-10 RX ADMIN — MAGNESIUM SULFATE IN WATER 2 G: 40 INJECTION, SOLUTION INTRAVENOUS at 09:05

## 2019-05-10 RX ADMIN — POTASSIUM PHOSPHATE, MONOBASIC AND POTASSIUM PHOSPHATE, DIBASIC 30 MMOL: 224; 236 INJECTION, SOLUTION, CONCENTRATE INTRAVENOUS at 09:05

## 2019-05-10 RX ADMIN — IOHEXOL 75 ML: 350 INJECTION, SOLUTION INTRAVENOUS at 03:05

## 2019-05-10 RX ADMIN — TRAMADOL HYDROCHLORIDE 50 MG: 50 TABLET, FILM COATED ORAL at 03:05

## 2019-05-10 RX ADMIN — LEVOTHYROXINE SODIUM 75 MCG: 75 TABLET ORAL at 06:05

## 2019-05-10 NOTE — ASSESSMENT & PLAN NOTE
- Most likely pre-renal from decrease oral intake.    Plan:    1- Encourage oral intake.  2- urine lytes.  3- f/u Crt level.  4- strict In/outs.

## 2019-05-10 NOTE — CONSULTS
[facility]  Neurosurgery  Progress Note    Subjective:     History of Present Illness: Ms. Rosita Almodovar is a 79 year old female with a PMHx of acquired hypothyroidism, essential HTN, CAD, HLP and COPD, who was transferred from [facility] for evaluation of a newly diagnosed cerebellar lesion. She initially presented to the [facility] with complaints of a 3 month history of unexplained weight loss, a 1 month history of increasing gait instability, and acute onset of dysarthria. Initial non contrasted MRI of the brain showed an acute or early subacute infarct in the posteromedial left frontal cortex and an area of abnormal signal in the cerebellum. Contrasted MRI showed a ring enhancing lesion in the left cerebellum and the left frontal cortex. Patient denies any previous history of cancer or stroke. Denies any use of anti coagulation or anti platelet therapy. She quit smoking in 2014. Currently, she denies a headache, vision changes, or worsening incontinence (some urinary incontinence at baseline). She feels that her speech has returned to normal. She continues to report intermittent dizziness, gait instability, and left sided weakness. Denies any progression of these symptoms.       Post-Op Info:  * No surgery found *           Medications:  Continuous Infusions:  Scheduled Meds:   atenolol  50 mg Oral Daily    fluticasone furoate-vilanterol  1 puff Inhalation Daily    levothyroxine  75 mcg Oral Before breakfast    pantoprazole  40 mg Oral Daily    potassium phosphate IVPB  30 mmol Intravenous Once    pravastatin  20 mg Oral Daily    tiotropium  1 capsule Inhalation Daily     PRN Meds:acetaminophen, albuterol-ipratropium, clonazePAM, dextrose 50%, dextrose 50%, glucagon (human recombinant), glucose, glucose, omnipaque, ondansetron, sodium chloride 0.9%, traMADol     Review of Systems   Constitutional: no fever, chills or night sweats. No changes in weight   Eyes: no visual  changes   ENT: no nasal congestion or sore throat   Respiratory: no cough or shortness of breath   Cardiovascular: no chest pain or palpitations   Gastrointestinal: no nausea or vomiting   Genitourinary: no hematuria or dysuria   Integument/Breast: no rash or pruritis   Hematologic/Lymphatic: no easy bruising or lymphadenopathy   Musculoskeletal: no arthralgias or myalgias.   Neurological: Positive for gait instability, dizziness, and left sided weakness.   Behavioral/Psych: no auditory or visual hallucinations   Endocrine: no heat or cold intolerance     Objective:     Weight: 59 kg (130 lb)  Body mass index is 24.17 kg/m².  Vital Signs (Most Recent):  Temp: 97.9 °F (36.6 °C) (05/10/19 0142)  Pulse: 73 (05/10/19 0142)  Resp: 20 (05/10/19 0142)  BP: 133/62 (05/10/19 0142)  SpO2: 96 % (05/10/19 0142) Vital Signs (24h Range):  Temp:  [97.4 °F (36.3 °C)-97.9 °F (36.6 °C)] 97.9 °F (36.6 °C)  Pulse:  [69-76] 73  Resp:  [18-20] 20  SpO2:  [91 %-97 %] 96 %  BP: ()/(49-77) 133/62       Neurosurgery Physical Exam   General: well developed, well nourished, poor dentition, no distress.   Head: normocephalic, atraumatic  Neurologic: Alert and oriented. Thought content appropriate.  GCS: Motor: 6/Verbal: 5/Eyes: 4 GCS Total: 15  Mental Status: Awake, Alert, Oriented x 4  Language: No aphasia  Speech: No dysarthria  Cranial nerves: face symmetric, tongue midline, CN II-XII grossly intact.   Eyes: pupils equal, round, reactive to light with accomodation, EOMI.   Pulmonary: normal respirations, no signs of respiratory distress  Abdomen: soft, non-distended, not tender to palpation  Skin: Skin is warm, dry and intact.  Sensory: intact to light touch throughout    Motor Strength:Moves all extremities spontaneously with good tone. No abnormal movements seen. Generalized deconditioning.     Strength  Deltoids Triceps Biceps Wrist Extension Wrist Flexion Hand    Upper: R 5-/5 5-/5 5-/5 5-/5 5-/5 5-/5    L 4/5 4/5 4/5 4/5 4/5  4/5     Iliopsoas Quadriceps Knee  Flexion Tibialis  anterior Gastro- cnemius EHL   Lower: R 4+/5 5-/5 4+/5 5-/5 5-/5 5-/5    L 4/5 4+/5 4/5 4+/5 4+/5 4+/5       Babinski's: Negative.  Clonus: Negative.  Finger-to-nose: intact bilaterally   Pronator drift: absent bilaterally  Gait: not assessed        Significant Labs:  Recent Labs   Lab 05/08/19  1544 05/09/19  0435 05/10/19  0504   GLU 90 91 74   * 134* 138   K 3.7 3.1* 3.3*   CL 92* 96* 102   CO2 29 29 25   BUN 19 19 13   CREATININE 1.3 1.4 1.0   CALCIUM 9.2 8.6 9.2   MG  --  1.5* 1.3*     Recent Labs   Lab 05/08/19  1544 05/09/19  0435 05/10/19  0504   WBC 10.40 8.50 7.86   HGB 12.4 11.9* 11.7*   HCT 37.8 36.1* 36.0*   * 379* 382*     Recent Labs   Lab 05/09/19  0435 05/10/19  0504   INR 0.9 1.0   APTT 27.6  --          Significant Diagnostics:  MRI brain w/ contrast 5/9:  I independently reviewed the imaging.     In correlation with noncontrast MRI images of brain 05/08/2019 evident is ring enhancement of previously demonstrated left cerebellar lesion and ring enhancement of demonstrated small lesion with diffusion restriction posteromedial left frontal cortex as seen on the prior MR images.  Given multiplicity of parenchymal lesions possibility of metastatic disease is raised.    Additionally, diffusely mottled appearance of the calvarial marrow is evident on the noncontrast T1 W images of 05/08/2019 as well as the current postcontrast images with more pronounced diminished T1 marrow signal right parietal region with overlying scalp soft tissue mass at the posterior right parietal convexity which demonstrates mild enhancement.  Focal T1 hypointense lesion also suspected at the C2 level.  As such the possibility of a marrow replacement disorder is raised including possibility of osseous metastases.  Described scalp soft tissue mass is nonspecific by MR imaging.    Assessment/Plan:     * Cerebellar mass  79 year old female with a PMHx of acquired  hypothyroidism, essential HTN, CAD, HLP and COPD, with a 3 month history of unexplained weight loss, a 1 month history of increasing gait instability, and acute onset of dysarthria. MRI showed an acute or early subacute infarct in the posteromedial left frontal cortex, a ring enhancing lesion in the left cerebellum, and  a ring enhancing lesion in the left frontal cortex.    -Patient neurologically stable on exam  -No acute neurosurgical intervention at this time.   -Recommend a contrasted CT of the chest/abd/pelvis for metastatic workup, when YAKOV stable  -Hold any anti coagulation in case surgical intervention required. DVT prophylaxis ok.   -HTN: Maintain SBP < 160  -Further recs pending imaging    Please call with any questions      Radha Brock PA-C   Neurosurgery   Pager: [phone]

## 2019-05-10 NOTE — PLAN OF CARE
Problem: Physical Therapy Goal  Goal: Physical Therapy Goal  Outcome: Ongoing (interventions implemented as appropriate)  Initial eval completed.  Results, POC, and therapy recommendations discussed with patient.   Complete evaluation documentation to follow.    Mobility Recommendations: walk with nursing using RW  D/c recommendations: home health PT     Miracle Sommer, CRESENCIO  5/10/2019  [phone] (pager)

## 2019-05-10 NOTE — PT/OT/SLP EVAL
"Physical Therapy Evaluation     Patient Name: Rosita Almodovar  MRN: [id number]   Diagnosis: Cerebellar mass    Recommendations:   Discharge Recommendations:  home health PT   Discharge Equipment Recommendations: none   Barriers to Discharge: none    Assessment:   Rosita Almodovar is a 79 y.o. female admitted with a medical diagnosis of Cerebellar mass.  Prior to admit she reported difficulty walking.  she now presents with the following impairments/functional limitations: weakness, gait instability, impaired balance, impaired endurance, impaired functional mobilty.   She was able to ambulate 50 feet with RW and CGA with no visible ataxia or LOB.  She would benefit from additional skilled PT services during this hospital admission. She is safe to d/c home and follow up with HH when medically appropriate.      Problem List:  weakness, gait instability, impaired balance, impaired endurance, impaired functional mobilty  Rehab Prognosis:  good.  The patient would benefit from acute skilled PT services to address these deficits and maximize their functional independence.    History:     Past Medical History:   Diagnosis Date    Acquired hypothyroidism 5/8/2019    COPD (chronic obstructive pulmonary disease) 5/8/2019       History reviewed. No pertinent surgical history.    Subjective   Patient comments/goals: "I can do this. Sometimes my  helps."  Pain/Comfort:  ·  Pain Rating 1: 0/10  · Pain Rating Post-Intervention 1: 0/10     Recent Vital Signs: (Last documentation)  Pulse: 72 (05/10/19 1200)  BP: 134/72 (05/10/19 1200)  SpO2: 96 % (05/10/19 1200)     Living Environment:  Home: The patient lives in [place] with her  in a 1 story home with threshold step to enter.  Walk in shower with seat  PLOF:  Independent with dressing, occasionally needed assistance with bed mobility, transfers and gait (only recently); independent with gait until 3 weeks ago. Now modified independent with gait using RW; no " driving, R handed  DME owned: walker, rolling, wheelchair, cane, straight    Assistance Available: Upon discharge, patient will have assistance from her .    Objective:   The patient had peripheral IV, telemetry    General Precautions: fall, seizure  Recent Surgery: * No surgery found *    Recent Vital Signs:   Pulse: 72 (05/10/19 1200)  BP: 134/72 (05/10/19 1200)  SpO2: 96 % (05/10/19 1200)    Physical Examination:   The patient was found supine in bed in NAD. She agreed to therapy and participated well.     Cognitive Function:  - Oriented to: person, place, and situation  - Level of Alertness: awake and alert  - Follows Commands/attention: Follows multistep  commands  - Communication: clear/fluent  - Safety awareness/insight to disability: intact  Musculoskeletal System  Upper Extremities:   PROM: L shoulder flexion limited  Strength: L shoulder limited  Lower Extremities:  PROM: WFL  Strength:  Muscle Group R LE L LE Comments   Hip ext NT/5 NT/5    Hip flexion  4/5 4/5       Knee flexion  5/5 5/5       Knee ext. 5/5 4/5    Ankle DF 5/5 5/5    Ankle PF 5/5 5/5    Neuromuscular System:  · Sensation: grossly intact LT  · Coordination:    Finger to thumb opposition: intact    Finger to nose: intact    Heel to shin: NT; no coordination deficits in function  Posture and gross symmetry: fwd flexed posture  Hard of hearing    BALANCE:  Sitting: independent  Standing: CGA to SBA with RW    FUNCTIONAL MOBILITY ASSESSMENT:  Bed Mobility: performed with HOB flat  · Rolling/Turning R: mod I with bed rail  · Rolling/Turning L: NT  · Supine > sit: SBA   · Sit > supine: NT  · Scooting EOB: SBA    Transfers:  · Sit <> stand transfer: CGA with RW   · Bed <> chair transfer: CGA with RW    Gait:   Gait x 50  feet CGA using RW (LEs difficult to visualize d/t long robe  · Patient demonstrated fwd flexed posture, decreased step length, and decreased foot clearance (shuffling gait pattern)  · PT facilitated: IV pole management  and safety  · Comments: no LOB, knee instability, or ataxia visualized    Therapeutic Activities, Education, or Exercises:  Therapist educated the patient on the role of PT, POC, progress with mobility, goals, discharge planning, and level of assistance with transfers and Importance of progressive mobilization, out of bed positioning, and participation in therapy.  The therapist discussed the patients current mobility status, deficits, and level of assistance with RN.  Time was also provided for active listening,  therapeutic counseling and discussion of health disposition. The therapist answered questions to patient/familys satisfaction within scope of practice.   White board updated to reflect current level of assistance.     FUNCTIONAL OUTCOME MEASURES:  [facility]  Turning over in bed (including adjusting bedclothes, sheets and blankets)?: 4  Sitting down on and standing up from a chair with arms (e.g., wheelchair, bedside commode, etc.): 3  Moving from lying on back to sitting on the side of the bed?: 4  Moving to and from a bed to a chair (including a wheelchair)?: 3  Need to walk in hospital room?: 3  Climbing 3-5 steps with a railing?: 3  Basic Mobility Total Score: 20    Goals:     Multidisciplinary Problems     Physical Therapy Goals        Problem: Physical Therapy Goal    Goal Priority Disciplines Outcome Goal Variances Interventions   Physical Therapy Goal     PT, PT/OT Ongoing (interventions implemented as appropriate)     Description:    Goals to be met by 5/20/2019    1. Pt will perform sit to stand transfers with modified independence using Rw.    2. Pt will perform bed <> chair transfers with modified independence using Rw.  3. Pt will perform gait x 150 feet with modified independence using RW.                      Plan:   During this hospitalization, patient will be seen 3 x/week for gait training, therapeutic activities, therapeutic exercises, neuromuscular re-education to address impairments and  functional mobility deficits.   · Plan of Care Expires: 06/10/19   Plan of Care Reviewed with: patient    This plan of care has been discussed with the patient and/or family who were involved in its development and are in agreement with the identified goals and treatment plan.     Clinical Decision Making:   COMPLEXITY OF PT EXAMINATION:  HISTORY  - Comorbidities that affect the PT plan of care or the patient's ability to participate in/progress with therapy (see above)  - Personal Factors: Patient's age and Prior level of function  EXAMINATION  - Body Systems: Cognition: a gross assessment of communication ability, orientation, level of consciousness, awareness of deficits, language, assessment of ability to make needs known, expected emotional or behavioral responses, learning style or preferences, Neuromuscular System: a general assessment of gross coordinated movement (ie. coordination, gait, balance, transitions, transfers), motor control, motor learning, or visual-perceptual skills  and Musculoskeletal System: the assessment of gross symmetry/posture, ROM, strength, spasticity, height, or weight  - Activity or participation limitations:   CLINICAL PRESENTATION: Stable and/or uncomplicated  -   LEVEL OF COMPLEXITY: Low Complexity: (1 or more apply) the patient has no personal factors or comorbidities that impact the plan of care; the examination addressed 1-2 body structures, functions, activity limitations, and/or participation restrictions; or the clinical presentation had stable or uncomplicated characteristics    Time Tracking:   PT Received On:  05/10/19  PT Start Time:   1100    PT Stop Time:  1130  PT Total Time (min): 30 min      Billable Minutes: Evaluation 30    Miracle Sommer, PT  10/02/2018  [phone] (pager)

## 2019-05-10 NOTE — ASSESSMENT & PLAN NOTE
Hx of poor appetite, weight loss, multiple falls.  - MRI w contrast 05/09: presence of two ring enhancing parenchymal lesions, mildly enhancing right parietal scalp lesion and underlying abnormal calvarial marrow signal and suspected focal marrow lesion body of C2, the concern for metastatic disease to brain and bone is raised.      Plan:    1- Consider NSGY consult in AM.  2- Consider CT- Chest/abdomen/pelvis for evaluation of possible primary tumor in AM.  3- follow up HIV and toxoplasmosis labs.

## 2019-05-10 NOTE — SUBJECTIVE & OBJECTIVE
Interval History: neurologically stable, pending ct imaging    Medications:  Continuous Infusions:  Scheduled Meds:   atenolol  50 mg Oral Daily    fluticasone furoate-vilanterol  1 puff Inhalation Daily    levothyroxine  75 mcg Oral Before breakfast    pantoprazole  40 mg Oral Daily    pravastatin  20 mg Oral Daily    tiotropium  1 capsule Inhalation Daily     PRN Meds:acetaminophen, albuterol-ipratropium, clonazePAM, dextrose 50%, dextrose 50%, glucagon (human recombinant), glucose, glucose, omnipaque, ondansetron, sodium chloride 0.9%, traMADol     Review of Systems  Objective:     Weight: 59 kg (130 lb)  Body mass index is 24.17 kg/m².  Vital Signs (Most Recent):  Temp: 97.6 °F (36.4 °C) (05/10/19 1200)  Pulse: 72 (05/10/19 1200)  Resp: 20 (05/10/19 1200)  BP: 134/72 (05/10/19 1200)  SpO2: 96 % (05/10/19 1200) Vital Signs (24h Range):  Temp:  [97.3 °F (36.3 °C)-97.9 °F (36.6 °C)] 97.6 °F (36.4 °C)  Pulse:  [69-76] 72  Resp:  [18-20] 20  SpO2:  [91 %-97 %] 96 %  BP: (120-134)/(62-77) 134/72                          Neurosurgery Physical Exam  General: well developed, well nourished, poor dentition, no distress.   Head: normocephalic, atraumatic  Neurologic: Alert and oriented. Thought content appropriate.  GCS: Motor: 6/Verbal: 5/Eyes: 4 GCS Total: 15  Mental Status: Awake, Alert, Oriented x 4  Language: No aphasia  Speech: No dysarthria  Cranial nerves: face symmetric, tongue midline, CN II-XII grossly intact.   Eyes: pupils equal, round, reactive to light with accomodation, EOMI.   Pulmonary: normal respirations, no signs of respiratory distress  Abdomen: soft, non-distended, not tender to palpation  Skin: Skin is warm, dry and intact.  Sensory: intact to light touch throughout     Motor Strength:Moves all extremities spontaneously with good tone. No abnormal movements seen. Generalized deconditioning.      Strength   Deltoids Triceps Biceps Wrist Extension Wrist Flexion Hand    Upper: R 5-/5 5-/5  5-/5 5-/5 5-/5 5-/5     L 4/5 4/5 4/5 4/5 4/5 4/5       Iliopsoas Quadriceps Knee  Flexion Tibialis  anterior Gastro- cnemius EHL   Lower: R 4+/5 5-/5 4+/5 5-/5 5-/5 5-/5     L 4/5 4+/5 4/5 4+/5 4+/5 4+/5         Babinski's: Negative.  Clonus: Negative.  Finger-to-nose: intact bilaterally   Pronator drift: absent bilaterally  Gait: not assessed           Significant Labs:  Recent Labs   Lab 05/09/19  0435 05/10/19  0504   GLU 91 74   * 138   K 3.1* 3.3*   CL 96* 102   CO2 29 25   BUN 19 13   CREATININE 1.4 1.0   CALCIUM 8.6 9.2   MG 1.5* 1.3*     Recent Labs   Lab 05/09/19 0435 05/10/19  0504   WBC 8.50 7.86   HGB 11.9* 11.7*   HCT 36.1* 36.0*   * 382*     Recent Labs   Lab 05/09/19 0435 05/10/19  0504   INR 0.9 1.0   APTT 27.6  --      Microbiology Results (last 7 days)     ** No results found for the last 168 hours. **        Recent Lab Results       05/10/19  0504        Albumin 2.8     Alkaline Phosphatase 120     ALT 6     Anion Gap 11     AST 28     Baso # 0.04     Basophil% 0.5     BILIRUBIN TOTAL 0.3  Comment:  For infants and newborns, interpretation of results should be based  on gestational age, weight and in agreement with clinical  observations.  Premature Infant recommended reference ranges:  Up to 24 hours.............<8.0 mg/dL  Up to 48 hours............<12.0 mg/dL  3-5 days..................<15.0 mg/dL  6-29 days.................<15.0 mg/dL       BUN, Bld 13     Calcium 9.2     Chloride 102     CO2 25     Creatinine 1.0     Differential Method Automated     eGFR if African American >60.0     eGFR if non  54.5  Comment:  Calculation used to obtain the estimated glomerular filtration  rate (eGFR) is the CKD-EPI equation.        Eos # 0.1     Eosinophil% 0.8     Estimated Avg Glucose 117     Glucose 74     Gran # (ANC) 5.8     Gran% 73.2     Hematocrit 36.0     Hemoglobin 11.7     Hemoglobin A1C External 5.7  Comment:  ADA Screening Guidelines:  5.7-6.4%  Consistent with  prediabetes  >or=6.5%  Consistent with diabetes  High levels of fetal hemoglobin interfere with the HbA1C  assay. Heterozygous hemoglobin variants (HbS, HgC, etc)do  not significantly interfere with this assay.   However, presence of multiple variants may affect accuracy.       HIV 1/2 Ag/Ab Negative     Immature Grans (Abs) 0.06  Comment:  Mild elevation in immature granulocytes is non specific and   can be seen in a variety of conditions including stress response,   acute inflammation, trauma and pregnancy. Correlation with other   laboratory and clinical findings is essential.       Immature Granulocytes 0.8     Coumadin Monitoring INR 1.0  Comment:  Coumadin Therapy:  2.0 - 3.0 for INR for all indicators except mechanical heart valves  and antiphospholipid syndromes which should use 2.5 - 3.5.       Lymph # 1.4     Lymph% 17.7     Magnesium 1.3     MCH 28.6     MCHC 32.5     MCV 88     Mono # 0.6     Mono% 7.0     MPV 10.1     nRBC 0     Phosphorus 2.1     Platelets 382     Potassium 3.3     PROTEIN TOTAL 5.7     Protein, Serum 5.3  Comment:  Serum protein electrophoresis and immunofixation results should be   interpreted in clinical context in that some therapeutic agents can   result   in false positive results (example, daratumumab). Correlation with   the   patient s therapeutic regimen is required.       Protime 10.2     RBC 4.09     RDW 13.2     Sodium 138     WBC 7.86           Significant Diagnostics:  CT: No results found in the last 24 hours.  Echoencephalography: No results found in the last 24 hours.  MRI: No results found in the last 24 hours.

## 2019-05-10 NOTE — SUBJECTIVE & OBJECTIVE
Past Medical History:   Diagnosis Date    Acquired hypothyroidism 5/8/2019    COPD (chronic obstructive pulmonary disease) 5/8/2019       History reviewed. No pertinent surgical history.    Review of patient's allergies indicates:  No Known Allergies    Current Facility-Administered Medications on File Prior to Encounter   Medication    [COMPLETED] gadobutrol (GADAVIST) injection 10 mL    [DISCONTINUED] 0.9%  NaCl infusion    [DISCONTINUED] acetaminophen tablet 650 mg    [DISCONTINUED] albuterol nebulizer solution 2.5 mg    [DISCONTINUED] aspirin EC tablet 81 mg    [DISCONTINUED] atenolol tablet 50 mg    [DISCONTINUED] atorvastatin tablet 40 mg    [DISCONTINUED] clonazePAM tablet 0.5 mg    [DISCONTINUED] clopidogrel tablet 75 mg    [DISCONTINUED] diphenhydrAMINE injection 25 mg    [DISCONTINUED] enoxaparin injection 30 mg    [DISCONTINUED] lorazepam injection 1 mg    [DISCONTINUED] lorazepam injection 3 mg    [DISCONTINUED] ondansetron injection 4 mg    [DISCONTINUED] pantoprazole EC tablet 40 mg    [DISCONTINUED] sodium chloride 0.9% flush 10 mL    [DISCONTINUED] sotalol tablet 80 mg    [DISCONTINUED] tiotropium inhalation capsule 18 mcg     Current Outpatient Medications on File Prior to Encounter   Medication Sig    albuterol (PROVENTIL) 2.5 mg /3 mL (0.083 %) nebulizer solution Take 3 mLs (2.5 mg total) by nebulization every 4 (four) hours as needed for Wheezing. Rescue    albuterol (VENTOLIN HFA) 90 mcg/actuation inhaler Inhale 1-2 puffs into the lungs every 4 (four) hours as needed for Wheezing. Rescue    atenolol (TENORMIN) 50 MG tablet Take 50 mg by mouth once daily.    budesonide-formoterol 160-4.5 mcg (SYMBICORT) 160-4.5 mcg/actuation HFAA Inhale 2 puffs into the lungs every 12 (twelve) hours. Controller    clonazePAM (KLONOPIN) 0.5 MG tablet TAKE ONE TABLET BY MOUTH THREE TIMES DAILY AS NEEDED FOR ANXIETY OR for panic attacks    DECARA 50,000 unit capsule Take 50,000 Units by  mouth every 7 days.    furosemide (LASIX) 20 MG tablet Take 20 mg by mouth once daily.     levothyroxine (SYNTHROID) 75 MCG tablet Take 75 mcg by mouth before breakfast.     lisinopril (PRINIVIL,ZESTRIL) 5 MG tablet Take 5 mg by mouth once daily.     pantoprazole (PROTONIX) 40 MG tablet TAKE ONE TABLET BY MOUTH EVERY DAY FOR stomach    pravastatin (PRAVACHOL) 20 MG tablet Take 20 mg by mouth once daily.     predniSONE (DELTASONE) 20 MG tablet Take 2 tablets (40 mg total) by mouth once daily. for 5 days    sotalol (BETAPACE) 80 MG tablet TAKE ONE TABLET BY MOUTH TWICE DAILY FOR BLOOD PRESSURE AND HEART    tiotropium bromide 2.5 mcg/actuation Mist Inhale 5 mcg into the lungs once daily. Controller    traMADol (ULTRAM) 50 mg tablet Take 50 mg by mouth 2 (two) times daily.     Family History     None        Tobacco Use    Smoking status: Former Smoker     Packs/day: 1.00     Years: 54.00     Pack years: 54.00     Types: Cigarettes     Last attempt to quit: 2014     Years since quittin.3    Smokeless tobacco: Never Used   Substance and Sexual Activity    Alcohol use: No     Frequency: Never    Drug use: No    Sexual activity: Yes     Partners: Male     Review of Systems   Constitutional: Positive for appetite change, chills, fatigue and unexpected weight change. Negative for diaphoresis and fever.   Respiratory: Positive for cough and shortness of breath.    Cardiovascular: Positive for palpitations. Negative for chest pain.   Gastrointestinal: Positive for nausea and vomiting. Negative for abdominal pain and blood in stool.   Genitourinary: Positive for dysuria. Negative for hematuria and menstrual problem.   Skin: Negative for rash.   Neurological: Positive for dizziness and headaches.     Objective:     Vital Signs (Most Recent):  Temp: 97.9 °F (36.6 °C) (05/10/19 0142)  Pulse: 73 (05/10/19 0142)  Resp: 20 (05/10/19 0142)  BP: 133/62 (05/10/19 0142)  SpO2: 96 % (05/10/19 0142) Vital Signs (24h  Range):  Temp:  [97.4 °F (36.3 °C)-97.9 °F (36.6 °C)] 97.9 °F (36.6 °C)  Pulse:  [69-76] 73  Resp:  [18-20] 20  SpO2:  [91 %-97 %] 96 %  BP: ()/(49-77) 133/62     Weight: 59 kg (130 lb)  Body mass index is 24.17 kg/m².    Physical Exam   Constitutional: She is oriented to person, place, and time. She appears well-developed.   HENT:   Head: Normocephalic.   Cardiovascular: Normal rate, regular rhythm and normal heart sounds.   No murmur heard.  Pulmonary/Chest: Effort normal and breath sounds normal.   Abdominal: Soft. Bowel sounds are normal. She exhibits no distension. There is tenderness (LLQ).   Musculoskeletal: Normal range of motion.   Neurological: She is alert and oriented to person, place, and time. No sensory deficit. She exhibits normal muscle tone. Coordination normal.   Skin: Skin is warm and dry.   Psychiatric: She has a normal mood and affect. Her behavior is normal.           Significant Labs:   Blood Culture: No results for input(s): LABBLOO in the last 48 hours.  BMP:   Recent Labs   Lab 05/09/19  0435   GLU 91   *   K 3.1*   CL 96*   CO2 29   BUN 19   CREATININE 1.4   CALCIUM 8.6   MG 1.5*     CBC:   Recent Labs   Lab 05/08/19  1544 05/09/19  0435   WBC 10.40 8.50   HGB 12.4 11.9*   HCT 37.8 36.1*   * 379*     CMP:   Recent Labs   Lab 05/08/19  1544 05/09/19  0435   * 134*   K 3.7 3.1*   CL 92* 96*   CO2 29 29   GLU 90 91   BUN 19 19   CREATININE 1.3 1.4   CALCIUM 9.2 8.6   PROT 6.5 5.9*   ALBUMIN 3.3* 3.1*   BILITOT 0.5 0.5   ALKPHOS 102 97   AST 26 32   ALT 10* 11*   ANIONGAP 11 9   EGFRNONAA 39.7* 36.3*     Cardiac Markers: No results for input(s): CKMB, MYOGLOBIN, BNP, TROPISTAT in the last 48 hours.  Coagulation:   Recent Labs   Lab 05/09/19  0435   INR 0.9   APTT 27.6     Lactic Acid: No results for input(s): LACTATE in the last 48 hours.  Lipid Panel:   Recent Labs   Lab 05/08/19  1544   CHOL 116   HDL 67   LDLCALC 34   TRIG 77   CHOLHDL 57.8*     Magnesium:   Recent  Labs   Lab 05/09/19  0435   MG 1.5*     Troponin:   Recent Labs   Lab 05/09/19  0435   TROPONINI 0.01     TSH:   Recent Labs   Lab 05/09/19 0435   TSH 0.86     Urine Culture: No results for input(s): LABURIN in the last 48 hours.  Urine Studies: No results for input(s): COLORU, APPEARANCEUA, PHUR, SPECGRAV, PROTEINUA, GLUCUA, KETONESU, BILIRUBINUA, OCCULTUA, NITRITE, UROBILINOGEN, LEUKOCYTESUR, RBCUA, WBCUA, BACTERIA, SQUAMEPITHEL, HYALINECASTS in the last 48 hours.    Invalid input(s): WRIGHTSUR  All pertinent labs within the past 24 hours have been reviewed.    Significant Imaging: I have reviewed and interpreted all pertinent imaging results/findings within the past 24 hours.

## 2019-05-10 NOTE — ASSESSMENT & PLAN NOTE
79 year old female with a PMHx of acquired hypothyroidism, essential HTN, CAD, HLP and COPD, with a 3 month history of unexplained weight loss, a 1 month history of increasing gait instability, and acute onset of dysarthria. 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    -Patient neurologically stable on exam  -No acute neurosurgical intervention at this time.   -pending CT of the chest/abd/pelvis for metastatic workup  -Hold any anti coagulation in case surgical intervention required. DVT prophylaxis ok.   -HTN: Maintain SBP < 160  -Further recs pending imaging

## 2019-05-10 NOTE — ASSESSMENT & PLAN NOTE
79 year old female with a PMHx of acquired hypothyroidism, essential HTN, CAD, HLP and COPD, with a 3 month history of unexplained weight loss, a 1 month history of increasing gait instability, and acute onset of dysarthria. 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    -Patient neurologically stable on exam  -No acute neurosurgical intervention at this time.   -Recommend a contrasted CT of the chest/abd/pelvis for metastatic workup, when YAKOV stable  -Hold any anti coagulation in case surgical intervention required. DVT prophylaxis ok.   -HTN: Maintain SBP < 160  -Further recs pending imaging

## 2019-05-10 NOTE — PROGRESS NOTES
[facility]  Neurosurgery  Progress Note    Subjective:     History of Present Illness: Ms. Rosita Almodovar is a 79 year old female with a PMHx of acquired hypothyroidism, essential HTN, CAD, HLP and COPD, who was transferred from [facility] for evaluation of a newly diagnosed cerebellar lesion. She initially presented to the [facility] with complaints of a 3 month history of unexplained weight loss, a 1 month history of increasing gait instability, and acute onset of dysarthria. Initial non contrasted MRI of the brain showed an acute or early subacute infarct in the posteromedial left frontal cortex and an area of abnormal signal in the cerebellum. Contrasted MRI showed a ring enhancing lesion in the left cerebellum and the left frontal cortex. Patient denies any previous history of cancer or stroke. Denies any use of anti coagulation or anti platelet therapy. She quit smoking in 2014. Currently, she denies a headache, vision changes, or worsening incontinence (some urinary incontinence at baseline). She feels that her speech has returned to normal. She continues to report intermittent dizziness, gait instability, and left sided weakness. Denies any progression of these symptoms.       Post-Op Info:  * No surgery found *         Interval History: neurologically stable, CT imaging with large left hilar mass occluding the left upper and lower lobar bronchi and narrowing the pulmonary vessels, consistent with lung cancer, as well as left adrenal mass.     Medications:  Continuous Infusions:  Scheduled Meds:   atenolol  50 mg Oral Daily    fluticasone furoate-vilanterol  1 puff Inhalation Daily    levothyroxine  75 mcg Oral Before breakfast    pantoprazole  40 mg Oral Daily    pravastatin  20 mg Oral Daily    tiotropium  1 capsule Inhalation Daily     PRN Meds:acetaminophen, albuterol-ipratropium, clonazePAM, dextrose 50%, dextrose 50%, glucagon (human recombinant), glucose, glucose,  omnipaque, ondansetron, sodium chloride 0.9%, traMADol     Review of Systems  Objective:     Weight: 59 kg (130 lb)  Body mass index is 24.17 kg/m².  Vital Signs (Most Recent):  Temp: 97.6 °F (36.4 °C) (05/10/19 1200)  Pulse: 72 (05/10/19 1200)  Resp: 20 (05/10/19 1200)  BP: 134/72 (05/10/19 1200)  SpO2: 96 % (05/10/19 1200) Vital Signs (24h Range):  Temp:  [97.3 °F (36.3 °C)-97.9 °F (36.6 °C)] 97.6 °F (36.4 °C)  Pulse:  [69-76] 72  Resp:  [18-20] 20  SpO2:  [91 %-97 %] 96 %  BP: (120-134)/(62-77) 134/72                          Neurosurgery Physical Exam  General: well developed, well nourished, poor dentition, no distress.   Head: normocephalic, atraumatic  Neurologic: Alert and oriented. Thought content appropriate.  GCS: Motor: 6/Verbal: 5/Eyes: 4 GCS Total: 15  Mental Status: Awake, Alert, Oriented x 4  Language: No aphasia  Speech: No dysarthria  Cranial nerves: face symmetric, tongue midline, CN II-XII grossly intact.   Eyes: pupils equal, round, reactive to light with accomodation, EOMI.   Pulmonary: normal respirations, no signs of respiratory distress  Abdomen: soft, non-distended, not tender to palpation  Skin: Skin is warm, dry and intact.  Sensory: intact to light touch throughout     Motor Strength:Moves all extremities spontaneously with good tone. No abnormal movements seen. Generalized deconditioning.      Strength   Deltoids Triceps Biceps Wrist Extension Wrist Flexion Hand    Upper: R 5-/5 5-/5 5-/5 5-/5 5-/5 5-/5     L 4/5 4/5 4/5 4/5 4/5 4/5       Iliopsoas Quadriceps Knee  Flexion Tibialis  anterior Gastro- cnemius EHL   Lower: R 4+/5 5-/5 4+/5 5-/5 5-/5 5-/5     L 4/5 4+/5 4/5 4+/5 4+/5 4+/5         Babinski's: Negative.  Clonus: Negative.  Finger-to-nose: intact bilaterally   Pronator drift: absent bilaterally  Gait: not assessed           Significant Labs:  Recent Labs   Lab 05/09/19  0435 05/10/19  0504   GLU 91 74   * 138   K 3.1* 3.3*   CL 96* 102   CO2 29 25   BUN 19 13    CREATININE 1.4 1.0   CALCIUM 8.6 9.2   MG 1.5* 1.3*     Recent Labs   Lab 05/09/19  0435 05/10/19  0504   WBC 8.50 7.86   HGB 11.9* 11.7*   HCT 36.1* 36.0*   * 382*     Recent Labs   Lab 05/09/19  0435 05/10/19  0504   INR 0.9 1.0   APTT 27.6  --      Microbiology Results (last 7 days)     ** No results found for the last 168 hours. **        Recent Lab Results       05/10/19  0504        Albumin 2.8     Alkaline Phosphatase 120     ALT 6     Anion Gap 11     AST 28     Baso # 0.04     Basophil% 0.5     BILIRUBIN TOTAL 0.3  Comment:  For infants and newborns, interpretation of results should be based  on gestational age, weight and in agreement with clinical  observations.  Premature Infant recommended reference ranges:  Up to 24 hours.............<8.0 mg/dL  Up to 48 hours............<12.0 mg/dL  3-5 days..................<15.0 mg/dL  6-29 days.................<15.0 mg/dL       BUN, Bld 13     Calcium 9.2     Chloride 102     CO2 25     Creatinine 1.0     Differential Method Automated     eGFR if African American >60.0     eGFR if non  54.5  Comment:  Calculation used to obtain the estimated glomerular filtration  rate (eGFR) is the CKD-EPI equation.        Eos # 0.1     Eosinophil% 0.8     Estimated Avg Glucose 117     Glucose 74     Gran # (ANC) 5.8     Gran% 73.2     Hematocrit 36.0     Hemoglobin 11.7     Hemoglobin A1C External 5.7  Comment:  ADA Screening Guidelines:  5.7-6.4%  Consistent with prediabetes  >or=6.5%  Consistent with diabetes  High levels of fetal hemoglobin interfere with the HbA1C  assay. Heterozygous hemoglobin variants (HbS, HgC, etc)do  not significantly interfere with this assay.   However, presence of multiple variants may affect accuracy.       HIV 1/2 Ag/Ab Negative     Immature Grans (Abs) 0.06  Comment:  Mild elevation in immature granulocytes is non specific and   can be seen in a variety of conditions including stress response,   acute inflammation, trauma  and pregnancy. Correlation with other   laboratory and clinical findings is essential.       Immature Granulocytes 0.8     Coumadin Monitoring INR 1.0  Comment:  Coumadin Therapy:  2.0 - 3.0 for INR for all indicators except mechanical heart valves  and antiphospholipid syndromes which should use 2.5 - 3.5.       Lymph # 1.4     Lymph% 17.7     Magnesium 1.3     MCH 28.6     MCHC 32.5     MCV 88     Mono # 0.6     Mono% 7.0     MPV 10.1     nRBC 0     Phosphorus 2.1     Platelets 382     Potassium 3.3     PROTEIN TOTAL 5.7     Protein, Serum 5.3  Comment:  Serum protein electrophoresis and immunofixation results should be   interpreted in clinical context in that some therapeutic agents can   result   in false positive results (example, daratumumab). Correlation with   the   patient s therapeutic regimen is required.       Protime 10.2     RBC 4.09     RDW 13.2     Sodium 138     WBC 7.86           Significant Diagnostics:  CT: No results found in the last 24 hours.  Echoencephalography: No results found in the last 24 hours.  MRI: No results found in the last 24 hours.    Assessment/Plan:     * Cerebellar mass  79 year old female with a PMHx of acquired hypothyroidism, essential HTN, CAD, HLP and COPD, with a 3 month history of unexplained weight loss, a 1 month history of increasing gait instability, and acute onset of dysarthria. 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    -Patient neurologically stable on exam  -No acute neurosurgical intervention at this time.   -Metastatic work-up per medicine and hem onc; CT C /A / P with large hilar mass with likely primary cancer.   -Hold any anti coagulation in case surgical intervention required. DVT prophylaxis ok.   -HTN: Maintain SBP < 160  -Further recs pending imaging discussion with staff        Jerome Del Toro,  MD  Neurosurgery  [facility]

## 2019-05-10 NOTE — PLAN OF CARE
Problem: Adult Inpatient Plan of Care  Goal: Plan of Care Review  Outcome: Ongoing (interventions implemented as appropriate)  Plan of care reviewed with pt. Pt voiced understanding. Pt AAOx4. Pt c/o headache and back pain during the shift. PRN dose of tramadol 50 mg given. Pt is going for ultrasound of abdomen in the am. NPO only sips of water with medication. No apparent distress noted. Vital signs stable. Fall precautions maintained. Bed in lowest position, and locked. Call light within reach and advised to call for assistance. Side rails x 2 and slip resistant socks on at this time. Will continue to monitor.

## 2019-05-10 NOTE — H&P
"[facility] Hospital Medicine  History & Physical    Patient Name: Rosita Almodovar  MRN: [id number]  Admission Date: 5/10/2019  Attending Physician: Xena Bronson MD   Primary Care Provider: Florecita Jameson NP    [facility] Medicine Team: [facility] 2 Jenni Krishnan MD     Patient information was obtained from patient, past medical records and ER records.     Subjective:     Principal Problem:Cerebellar mass    Chief Complaint: No chief complaint on file.       HPI: This is a 78 y/o female with acquired hypothyroidism, essential HTN, CAD, HLP and COPD was transferred from [facility] for brain mass evaluation    Per Notes"  Admitted to  on 5/8 with new onset dysarthria, dizziness and ataxia. Patient started using walker 1 month ago due to gait instability. Patient also reported 3 months of anorexia and weight loss of 22 lbs. Concern for CVA on admit so Neurology consulted for evaluation. Patient also noted to have mild YAKOV on admit 15/1.4 and hypotension with SBP in 90s. Patient started on IVFs and home medication of Lisinopril held. U/A negative for infection. 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 No other areas of acute infarction were appreciated. There was also noted to be a 1.4 cm rounded/ring like focus of abnormal signal cerebellum on the left with details and signal characteristics as discussed above not typical for recent hemorrhage or acute infarction. Repeat MRI of brain with contrast done today, 5/9 showed ring enhancement of previously demonstrated left cerebellar lesion and ring enhancement of demonstrated small lesion with diffusion restriction posteromedial left frontal cortex as seen on the prior MR images.  Given multiplicity of parenchymal lesions possibility of metastatic disease is raised. There was also appearance of the " "calvarial marrow is evident on the non-contrast T1 W images of 05/08/2019 as well as the current postcontrast images with more pronounced diminished T1 marrow signal right parietal region with overlying scalp soft tissue mass at the posterior right parietal convexity which demonstrates mild enhancement.  Focal T1 hypointense lesion also suspected at the C2 level.  As such the possibility of a marrow replacement disorder is raised including possibility of osseous metastases. Renal ultrasound done on this admit was unremarkable. Carotid US showed no significant stenosis. Concern for metastatic cancer and no Oncology, Rad/Onc or Neurosurgery available at [place]. Case Discussed with Dr. Ivy and states patient is awake and alert and oriented x 3. Patient with no focal neuro deficits just mild dysarthria. No edema on MRI of brain so steroids not started."     Per patient" state that she has been having Loss of appetite for couple of months now with lukasz loss, chills and multiple falls (last one 2 weeks ago) hit her head on the table. Denies any fevers or night sweats. No hx of seziures or syncope"    PMHx: COPD/HTN/GERD/Anxiety/hypothyrodism.  Social: former smoker Quit 2014. Has Dogs and several cats (including stray cats).  FHx: Denies any cancer history in the family.    Last coloscopy and EGD done 5 years ago was normal.  Last mammogram 07/2018 was negative.    Past Medical History:   Diagnosis Date    Acquired hypothyroidism 5/8/2019    COPD (chronic obstructive pulmonary disease) 5/8/2019       History reviewed. No pertinent surgical history.    Review of patient's allergies indicates:  No Known Allergies    Current Facility-Administered Medications on File Prior to Encounter   Medication    [COMPLETED] gadobutrol (GADAVIST) injection 10 mL    [DISCONTINUED] 0.9%  NaCl infusion    [DISCONTINUED] acetaminophen tablet 650 mg    [DISCONTINUED] albuterol nebulizer solution 2.5 mg    [DISCONTINUED] " aspirin EC tablet 81 mg    [DISCONTINUED] atenolol tablet 50 mg    [DISCONTINUED] atorvastatin tablet 40 mg    [DISCONTINUED] clonazePAM tablet 0.5 mg    [DISCONTINUED] clopidogrel tablet 75 mg    [DISCONTINUED] diphenhydrAMINE injection 25 mg    [DISCONTINUED] enoxaparin injection 30 mg    [DISCONTINUED] lorazepam injection 1 mg    [DISCONTINUED] lorazepam injection 3 mg    [DISCONTINUED] ondansetron injection 4 mg    [DISCONTINUED] pantoprazole EC tablet 40 mg    [DISCONTINUED] sodium chloride 0.9% flush 10 mL    [DISCONTINUED] sotalol tablet 80 mg    [DISCONTINUED] tiotropium inhalation capsule 18 mcg     Current Outpatient Medications on File Prior to Encounter   Medication Sig    albuterol (PROVENTIL) 2.5 mg /3 mL (0.083 %) nebulizer solution Take 3 mLs (2.5 mg total) by nebulization every 4 (four) hours as needed for Wheezing. Rescue    albuterol (VENTOLIN HFA) 90 mcg/actuation inhaler Inhale 1-2 puffs into the lungs every 4 (four) hours as needed for Wheezing. Rescue    atenolol (TENORMIN) 50 MG tablet Take 50 mg by mouth once daily.    budesonide-formoterol 160-4.5 mcg (SYMBICORT) 160-4.5 mcg/actuation HFAA Inhale 2 puffs into the lungs every 12 (twelve) hours. Controller    clonazePAM (KLONOPIN) 0.5 MG tablet TAKE ONE TABLET BY MOUTH THREE TIMES DAILY AS NEEDED FOR ANXIETY OR for panic attacks    DECARA 50,000 unit capsule Take 50,000 Units by mouth every 7 days.    furosemide (LASIX) 20 MG tablet Take 20 mg by mouth once daily.     levothyroxine (SYNTHROID) 75 MCG tablet Take 75 mcg by mouth before breakfast.     lisinopril (PRINIVIL,ZESTRIL) 5 MG tablet Take 5 mg by mouth once daily.     pantoprazole (PROTONIX) 40 MG tablet TAKE ONE TABLET BY MOUTH EVERY DAY FOR stomach    pravastatin (PRAVACHOL) 20 MG tablet Take 20 mg by mouth once daily.     predniSONE (DELTASONE) 20 MG tablet Take 2 tablets (40 mg total) by mouth once daily. for 5 days    sotalol (BETAPACE) 80 MG tablet TAKE  ONE TABLET BY MOUTH TWICE DAILY FOR BLOOD PRESSURE AND HEART    tiotropium bromide 2.5 mcg/actuation Mist Inhale 5 mcg into the lungs once daily. Controller    traMADol (ULTRAM) 50 mg tablet Take 50 mg by mouth 2 (two) times daily.     Family History     None        Tobacco Use    Smoking status: Former Smoker     Packs/day: 1.00     Years: 54.00     Pack years: 54.00     Types: Cigarettes     Last attempt to quit: 2014     Years since quittin.3    Smokeless tobacco: Never Used   Substance and Sexual Activity    Alcohol use: No     Frequency: Never    Drug use: No    Sexual activity: Yes     Partners: Male     Review of Systems   Constitutional: Positive for appetite change, chills, fatigue and unexpected weight change. Negative for diaphoresis and fever.   Respiratory: Positive for cough and shortness of breath.    Cardiovascular: Positive for palpitations. Negative for chest pain.   Gastrointestinal: Positive for nausea and vomiting. Negative for abdominal pain and blood in stool.   Genitourinary: Positive for dysuria. Negative for hematuria and menstrual problem.   Skin: Negative for rash.   Neurological: Positive for dizziness and headaches.     Objective:     Vital Signs (Most Recent):  Temp: 97.9 °F (36.6 °C) (05/10/19 0142)  Pulse: 73 (05/10/19 0142)  Resp: 20 (05/10/19 0142)  BP: 133/62 (05/10/19 0142)  SpO2: 96 % (05/10/19 0142) Vital Signs (24h Range):  Temp:  [97.4 °F (36.3 °C)-97.9 °F (36.6 °C)] 97.9 °F (36.6 °C)  Pulse:  [69-76] 73  Resp:  [18-20] 20  SpO2:  [91 %-97 %] 96 %  BP: ()/(49-77) 133/62     Weight: 59 kg (130 lb)  Body mass index is 24.17 kg/m².    Physical Exam   Constitutional: She is oriented to person, place, and time. She appears well-developed.   HENT:   Head: Normocephalic.   Cardiovascular: Normal rate, regular rhythm and normal heart sounds.   No murmur heard.  Pulmonary/Chest: Effort normal and breath sounds normal.   Abdominal: Soft. Bowel sounds are normal. She  exhibits no distension. There is tenderness (LLQ).   Musculoskeletal: Normal range of motion.   Neurological: She is alert and oriented to person, place, and time. No sensory deficit. She exhibits normal muscle tone. Coordination normal.   Skin: Skin is warm and dry.   Psychiatric: She has a normal mood and affect. Her behavior is normal.           Significant Labs:   Blood Culture: No results for input(s): LABBLOO in the last 48 hours.  BMP:   Recent Labs   Lab 05/09/19 0435   GLU 91   *   K 3.1*   CL 96*   CO2 29   BUN 19   CREATININE 1.4   CALCIUM 8.6   MG 1.5*     CBC:   Recent Labs   Lab 05/08/19  1544 05/09/19 0435   WBC 10.40 8.50   HGB 12.4 11.9*   HCT 37.8 36.1*   * 379*     CMP:   Recent Labs   Lab 05/08/19  1544 05/09/19 0435   * 134*   K 3.7 3.1*   CL 92* 96*   CO2 29 29   GLU 90 91   BUN 19 19   CREATININE 1.3 1.4   CALCIUM 9.2 8.6   PROT 6.5 5.9*   ALBUMIN 3.3* 3.1*   BILITOT 0.5 0.5   ALKPHOS 102 97   AST 26 32   ALT 10* 11*   ANIONGAP 11 9   EGFRNONAA 39.7* 36.3*     Cardiac Markers: No results for input(s): CKMB, MYOGLOBIN, BNP, TROPISTAT in the last 48 hours.  Coagulation:   Recent Labs   Lab 05/09/19 0435   INR 0.9   APTT 27.6     Lactic Acid: No results for input(s): LACTATE in the last 48 hours.  Lipid Panel:   Recent Labs   Lab 05/08/19  1544   CHOL 116   HDL 67   LDLCALC 34   TRIG 77   CHOLHDL 57.8*     Magnesium:   Recent Labs   Lab 05/09/19 0435   MG 1.5*     Troponin:   Recent Labs   Lab 05/09/19 0435   TROPONINI 0.01     TSH:   Recent Labs   Lab 05/09/19 0435   TSH 0.86     Urine Culture: No results for input(s): LABURIN in the last 48 hours.  Urine Studies: No results for input(s): COLORU, APPEARANCEUA, PHUR, SPECGRAV, PROTEINUA, GLUCUA, KETONESU, BILIRUBINUA, OCCULTUA, NITRITE, UROBILINOGEN, LEUKOCYTESUR, RBCUA, WBCUA, BACTERIA, SQUAMEPITHEL, HYALINECASTS in the last 48 hours.    Invalid input(s): KIP  All pertinent labs within the past 24 hours have been  reviewed.    Significant Imaging: I have reviewed and interpreted all pertinent imaging results/findings within the past 24 hours.    Assessment/Plan:     * Cerebellar mass  Hx of poor appetite, weight loss, multiple falls.  - MRI w contrast 05/09: presence of two ring enhancing parenchymal lesions, mildly enhancing right parietal scalp lesion and underlying abnormal calvarial marrow signal and suspected focal marrow lesion body of C2, the concern for metastatic disease to brain and bone is raised.      Plan:    1- Consider NSGY consult in AM.  2- Consider CT- Chest/abdomen/pelvis for evaluation of possible primary tumor in AM.  3- follow up HIV and toxoplasmosis labs.        Dysarthria  Resolved.      Ataxia  - PT/OT ordered appreciate their help.      YAKOV (acute kidney injury)  - Most likely pre-renal from decrease oral intake.    Plan:    1- Encourage oral intake.  2- urine lytes.  3- f/u Crt level.  4- strict In/outs.      COPD (chronic obstructive pulmonary disease)  - Continue home inhalers.   - Dunebs prn.      Acquired hypothyroidism  - Continue levothyroxine.      Anorexia  - due to decrease PO intake.  - Dietitian consulted appreciate their help.      Anxiety  - Continue home clonazepam 0.5 mg TID prn.      GERD (gastroesophageal reflux disease)  - Continue home pantoprazole.      Essential hypertension  - Continue home atenolol.  - Hold lisinopril in setting of possible YAKOV.      Fall at home  - fall precautions.  - PT/OT ordered appreciate their help.        VTE Risk Mitigation (From admission, onward)        Ordered     Place sequential compression device  Until discontinued      05/10/19 0220     IP VTE HIGH RISK PATIENT  Once      05/10/19 0220             Jenni Krishnan MD  Department of Hospital Medicine   [facility]

## 2019-05-10 NOTE — SUBJECTIVE & OBJECTIVE
Medications:  Continuous Infusions:  Scheduled Meds:   atenolol  50 mg Oral Daily    fluticasone furoate-vilanterol  1 puff Inhalation Daily    levothyroxine  75 mcg Oral Before breakfast    pantoprazole  40 mg Oral Daily    potassium phosphate IVPB  30 mmol Intravenous Once    pravastatin  20 mg Oral Daily    tiotropium  1 capsule Inhalation Daily     PRN Meds:acetaminophen, albuterol-ipratropium, clonazePAM, dextrose 50%, dextrose 50%, glucagon (human recombinant), glucose, glucose, omnipaque, ondansetron, sodium chloride 0.9%, traMADol     Review of Systems   Constitutional: no fever, chills or night sweats. No changes in weight   Eyes: no visual changes   ENT: no nasal congestion or sore throat   Respiratory: no cough or shortness of breath   Cardiovascular: no chest pain or palpitations   Gastrointestinal: no nausea or vomiting   Genitourinary: no hematuria or dysuria   Integument/Breast: no rash or pruritis   Hematologic/Lymphatic: no easy bruising or lymphadenopathy   Musculoskeletal: no arthralgias or myalgias.   Neurological: Positive for gait instability, dizziness, and left sided weakness.   Behavioral/Psych: no auditory or visual hallucinations   Endocrine: no heat or cold intolerance     Objective:     Weight: 59 kg (130 lb)  Body mass index is 24.17 kg/m².  Vital Signs (Most Recent):  Temp: 97.9 °F (36.6 °C) (05/10/19 0142)  Pulse: 73 (05/10/19 0142)  Resp: 20 (05/10/19 0142)  BP: 133/62 (05/10/19 0142)  SpO2: 96 % (05/10/19 0142) Vital Signs (24h Range):  Temp:  [97.4 °F (36.3 °C)-97.9 °F (36.6 °C)] 97.9 °F (36.6 °C)  Pulse:  [69-76] 73  Resp:  [18-20] 20  SpO2:  [91 %-97 %] 96 %  BP: ()/(49-77) 133/62       Neurosurgery Physical Exam   General: well developed, well nourished, poor dentition, no distress.   Head: normocephalic, atraumatic  Neurologic: Alert and oriented. Thought content appropriate.  GCS: Motor: 6/Verbal: 5/Eyes: 4 GCS Total: 15  Mental Status: Awake, Alert, Oriented x  4  Language: No aphasia  Speech: No dysarthria  Cranial nerves: face symmetric, tongue midline, CN II-XII grossly intact.   Eyes: pupils equal, round, reactive to light with accomodation, EOMI.   Pulmonary: normal respirations, no signs of respiratory distress  Abdomen: soft, non-distended, not tender to palpation  Skin: Skin is warm, dry and intact.  Sensory: intact to light touch throughout    Motor Strength:Moves all extremities spontaneously with good tone. No abnormal movements seen. Generalized deconditioning.     Strength  Deltoids Triceps Biceps Wrist Extension Wrist Flexion Hand    Upper: R 5-/5 5-/5 5-/5 5-/5 5-/5 5-/5    L 4/5 4/5 4/5 4/5 4/5 4/5     Iliopsoas Quadriceps Knee  Flexion Tibialis  anterior Gastro- cnemius EHL   Lower: R 4+/5 5-/5 4+/5 5-/5 5-/5 5-/5    L 4/5 4+/5 4/5 4+/5 4+/5 4+/5       Babinski's: Negative.  Clonus: Negative.  Finger-to-nose: intact bilaterally   Pronator drift: absent bilaterally  Gait: not assessed        Significant Labs:  Recent Labs   Lab 05/08/19  1544 05/09/19  0435 05/10/19  0504   GLU 90 91 74   * 134* 138   K 3.7 3.1* 3.3*   CL 92* 96* 102   CO2 29 29 25   BUN 19 19 13   CREATININE 1.3 1.4 1.0   CALCIUM 9.2 8.6 9.2   MG  --  1.5* 1.3*     Recent Labs   Lab 05/08/19  1544 05/09/19  0435 05/10/19  0504   WBC 10.40 8.50 7.86   HGB 12.4 11.9* 11.7*   HCT 37.8 36.1* 36.0*   * 379* 382*     Recent Labs   Lab 05/09/19  0435 05/10/19  0504   INR 0.9 1.0   APTT 27.6  --          Significant Diagnostics:  MRI brain w/ contrast 5/9:  I independently reviewed the imaging.     In correlation with noncontrast MRI images of brain 05/08/2019 evident is ring enhancement of previously demonstrated left cerebellar lesion and ring enhancement of demonstrated small lesion with diffusion restriction posteromedial left frontal cortex as seen on the prior MR images.  Given multiplicity of parenchymal lesions possibility of metastatic disease is raised.    Additionally,  diffusely mottled appearance of the calvarial marrow is evident on the noncontrast T1 W images of 05/08/2019 as well as the current postcontrast images with more pronounced diminished T1 marrow signal right parietal region with overlying scalp soft tissue mass at the posterior right parietal convexity which demonstrates mild enhancement.  Focal T1 hypointense lesion also suspected at the C2 level.  As such the possibility of a marrow replacement disorder is raised including possibility of osseous metastases.  Described scalp soft tissue mass is nonspecific by MR imaging.

## 2019-05-10 NOTE — PLAN OF CARE
05/10/19 0908   Discharge Assessment   Assessment Type Discharge Planning Assessment   Confirmed/corrected address and phone number on facesheet? Yes   Assessment information obtained from? Patient   Expected Length of Stay (days) 2   Communicated expected length of stay with patient/caregiver yes   Prior to hospitilization cognitive status: Alert/Oriented   Prior to hospitalization functional status: Assistive Equipment   Current cognitive status: Alert/Oriented   Current Functional Status: Assistive Equipment   Facility Arrived From: [facility]    Lives With child(saige), adult;spouse   Able to Return to Prior Arrangements yes   Is patient able to care for self after discharge? Yes   Patient's perception of discharge disposition home health   Readmission Within the Last 30 Days no previous admission in last 30 days   Patient currently being followed by outpatient case management? No   Patient currently receives any other outside agency services? No   Equipment Currently Used at Home walker, rolling;3-in-1 commode;oxygen;wheelchair   Do you have any problems affording any of your prescribed medications? No   Is the patient taking medications as prescribed? yes   Does the patient have transportation home? Yes   Transportation Anticipated family or friend will provide   Does the patient receive services at the [facility]? No   Discharge Plan A Home Health   Discharge Plan B Home with family   DME Needed Upon Discharge  none   Patient/Family in Agreement with Plan yes     Florecita Jameson NP  [address] [place] / [place] [address]    Extended Emergency Contact Information  Primary Emergency Contact: Edenilson Almodovar  Address[address] [place] [address] [facility] [place]  Mobile Phone: [phone]  Relation: Spouse      [facility] [place] [address]  [address]  [place] [address]  Phone: [phone] Fax:  [phone]

## 2019-05-10 NOTE — HPI
"This is a 78 y/o female with acquired hypothyroidism, essential HTN, CAD, HLP and COPD was transferred from [facility] for brain mass evaluation    Per Notes"  Admitted to [facility] on 5/8 with new onset dysarthria, dizziness and ataxia. Patient started using walker 1 month ago due to gait instability. Patient also reported 3 months of anorexia and weight loss of 22 lbs. Concern for CVA on admit so Neurology consulted for evaluation. Patient also noted to have mild YAKOV on admit 15/1.4 and hypotension with SBP in 90s. Patient started on IVFs and home medication of Lisinopril held. U/A negative for infection. 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 No other areas of acute infarction were appreciated. There was also noted to be a 1.4 cm rounded/ring like focus of abnormal signal cerebellum on the left with details and signal characteristics as discussed above not typical for recent hemorrhage or acute infarction. Repeat MRI of brain with contrast done today, 5/9 showed ring enhancement of previously demonstrated left cerebellar lesion and ring enhancement of demonstrated small lesion with diffusion restriction posteromedial left frontal cortex as seen on the prior MR images.  Given multiplicity of parenchymal lesions possibility of metastatic disease is raised. There was also appearance of the calvarial marrow is evident on the non-contrast T1 W images of 05/08/2019 as well as the current postcontrast images with more pronounced diminished T1 marrow signal right parietal region with overlying scalp soft tissue mass at the posterior right parietal convexity which demonstrates mild enhancement.  Focal T1 hypointense lesion also suspected at the C2 level.  As such the possibility of a marrow replacement disorder is raised including possibility of osseous metastases. Renal ultrasound done on this " "admit was unremarkable. Carotid US showed no significant stenosis. Concern for metastatic cancer and no Oncology, Rad/Onc or Neurosurgery available at [place]. Case Discussed with Dr. Ivy and states patient is awake and alert and oriented x 3. Patient with no focal neuro deficits just mild dysarthria. No edema on MRI of brain so steroids not started."     Per patient" state that she has been having Loss of appetite for couple of months now with lukasz loss, chills and multiple falls (last one 2 weeks ago) hit her head on the table. Denies any fevers or night sweats. No hx of seziures or syncope"    PMHx: COPD/HTN/GERD/Anxiety/hypothyrodism.  Social: former smoker Quit 2014. Has Dogs and several cats (including stray cats).  FHx: Denies any cancer history in the family.    Last coloscopy and EGD done 5 years ago was normal.  Last mammogram 07/2018 was negative.  "

## 2019-05-11 VITALS
HEART RATE: 78 BPM | TEMPERATURE: 98 F | OXYGEN SATURATION: 95 % | DIASTOLIC BLOOD PRESSURE: 60 MMHG | SYSTOLIC BLOOD PRESSURE: 128 MMHG | HEIGHT: 62 IN | WEIGHT: 130 LBS | RESPIRATION RATE: 18 BRPM | BODY MASS INDEX: 23.92 KG/M2

## 2019-05-11 DIAGNOSIS — G93.89 CEREBELLAR MASS: ICD-10-CM

## 2019-05-11 DIAGNOSIS — R91.8 ABNORMAL CT SCAN, LUNG: Primary | ICD-10-CM

## 2019-05-11 LAB
ALBUMIN SERPL BCP-MCNC: 2.5 G/DL (ref 3.5–5.2)
ALP SERPL-CCNC: 116 U/L (ref 55–135)
ALT SERPL W/O P-5'-P-CCNC: 7 U/L (ref 10–44)
ANION GAP SERPL CALC-SCNC: 11 MMOL/L (ref 8–16)
AST SERPL-CCNC: 24 U/L (ref 10–40)
BASOPHILS # BLD AUTO: 0.05 K/UL (ref 0–0.2)
BASOPHILS NFR BLD: 0.7 % (ref 0–1.9)
BILIRUB SERPL-MCNC: 0.3 MG/DL (ref 0.1–1)
BUN SERPL-MCNC: 8 MG/DL (ref 8–23)
CALCIUM SERPL-MCNC: 8.8 MG/DL (ref 8.7–10.5)
CHLORIDE SERPL-SCNC: 103 MMOL/L (ref 95–110)
CO2 SERPL-SCNC: 23 MMOL/L (ref 23–29)
CREAT SERPL-MCNC: 0.7 MG/DL (ref 0.5–1.4)
DIFFERENTIAL METHOD: ABNORMAL
EOSINOPHIL # BLD AUTO: 0.1 K/UL (ref 0–0.5)
EOSINOPHIL NFR BLD: 1.4 % (ref 0–8)
ERYTHROCYTE [DISTWIDTH] IN BLOOD BY AUTOMATED COUNT: 13.2 % (ref 11.5–14.5)
EST. GFR  (AFRICAN AMERICAN): >60 ML/MIN/1.73 M^2
EST. GFR  (NON AFRICAN AMERICAN): >60 ML/MIN/1.73 M^2
GLUCOSE SERPL-MCNC: 61 MG/DL (ref 70–110)
HCT VFR BLD AUTO: 34.7 % (ref 37–48.5)
HGB BLD-MCNC: 11.4 G/DL (ref 12–16)
IMM GRANULOCYTES # BLD AUTO: 0.05 K/UL (ref 0–0.04)
IMM GRANULOCYTES NFR BLD AUTO: 0.7 % (ref 0–0.5)
LYMPHOCYTES # BLD AUTO: 1.7 K/UL (ref 1–4.8)
LYMPHOCYTES NFR BLD: 23.2 % (ref 18–48)
MAGNESIUM SERPL-MCNC: 1.6 MG/DL (ref 1.6–2.6)
MCH RBC QN AUTO: 28.5 PG (ref 27–31)
MCHC RBC AUTO-ENTMCNC: 32.9 G/DL (ref 32–36)
MCV RBC AUTO: 87 FL (ref 82–98)
MONOCYTES # BLD AUTO: 0.7 K/UL (ref 0.3–1)
MONOCYTES NFR BLD: 9.4 % (ref 4–15)
NEUTROPHILS # BLD AUTO: 4.6 K/UL (ref 1.8–7.7)
NEUTROPHILS NFR BLD: 64.6 % (ref 38–73)
NRBC BLD-RTO: 0 /100 WBC
PHOSPHATE SERPL-MCNC: 3.9 MG/DL (ref 2.7–4.5)
PLATELET # BLD AUTO: 368 K/UL (ref 150–350)
PMV BLD AUTO: 10 FL (ref 9.2–12.9)
POTASSIUM SERPL-SCNC: 3.5 MMOL/L (ref 3.5–5.1)
PROT SERPL-MCNC: 5.2 G/DL (ref 6–8.4)
RBC # BLD AUTO: 4 M/UL (ref 4–5.4)
SODIUM SERPL-SCNC: 137 MMOL/L (ref 136–145)
WBC # BLD AUTO: 7.15 K/UL (ref 3.9–12.7)

## 2019-05-11 PROCEDURE — 80053 COMPREHEN METABOLIC PANEL: CPT

## 2019-05-11 PROCEDURE — 85025 COMPLETE CBC W/AUTO DIFF WBC: CPT

## 2019-05-11 PROCEDURE — 99239 PR HOSPITAL DISCHARGE DAY,>30 MIN: ICD-10-PCS | Mod: GC,,, | Performed by: INTERNAL MEDICINE

## 2019-05-11 PROCEDURE — 99233 SBSQ HOSP IP/OBS HIGH 50: CPT | Mod: GC,,, | Performed by: INTERNAL MEDICINE

## 2019-05-11 PROCEDURE — 99223 PR INITIAL HOSPITAL CARE,LEVL III: ICD-10-PCS | Mod: ,,, | Performed by: INTERNAL MEDICINE

## 2019-05-11 PROCEDURE — 83735 ASSAY OF MAGNESIUM: CPT

## 2019-05-11 PROCEDURE — 99223 1ST HOSP IP/OBS HIGH 75: CPT | Mod: ,,, | Performed by: INTERNAL MEDICINE

## 2019-05-11 PROCEDURE — 36415 COLL VENOUS BLD VENIPUNCTURE: CPT

## 2019-05-11 PROCEDURE — 84100 ASSAY OF PHOSPHORUS: CPT

## 2019-05-11 PROCEDURE — 25000003 PHARM REV CODE 250: Performed by: STUDENT IN AN ORGANIZED HEALTH CARE EDUCATION/TRAINING PROGRAM

## 2019-05-11 PROCEDURE — 99233 PR SUBSEQUENT HOSPITAL CARE,LEVL III: ICD-10-PCS | Mod: GC,,, | Performed by: INTERNAL MEDICINE

## 2019-05-11 PROCEDURE — 99239 HOSP IP/OBS DSCHRG MGMT >30: CPT | Mod: GC,,, | Performed by: INTERNAL MEDICINE

## 2019-05-11 RX ORDER — IBUPROFEN 200 MG
16 TABLET ORAL ONCE
Status: DISCONTINUED | OUTPATIENT
Start: 2019-05-11 | End: 2019-05-11

## 2019-05-11 RX ORDER — ERGOCALCIFEROL 1.25 MG/1
50000 CAPSULE ORAL
COMMUNITY
End: 2019-09-04 | Stop reason: SDUPTHER

## 2019-05-11 RX ADMIN — ATENOLOL 50 MG: 50 TABLET ORAL at 09:05

## 2019-05-11 RX ADMIN — PANTOPRAZOLE SODIUM 40 MG: 40 TABLET, DELAYED RELEASE ORAL at 09:05

## 2019-05-11 RX ADMIN — Medication 16 G: at 06:05

## 2019-05-11 RX ADMIN — PRAVASTATIN SODIUM 20 MG: 20 TABLET ORAL at 09:05

## 2019-05-11 RX ADMIN — LEVOTHYROXINE SODIUM 75 MCG: 75 TABLET ORAL at 05:05

## 2019-05-11 NOTE — PLAN OF CARE
Problem: Adult Inpatient Plan of Care  Goal: Plan of Care Review  Outcome: Ongoing (interventions implemented as appropriate)  POC reviewed with patient and spouse, understanding verbalized. Patient awake, alert and oriented X4. Bilateral clear breath sounds noted; diminished at the bases and a non-productive cough noted. Patient [place] to both ears. Reports pain to left shoulder and rt hand numbness. Generalized weakness noted, ambulates with 1 assist using a walker. Urine for urianalysis collected and sent to lab. Fall precautions in place, bed locked, and in low position. Bed alarm active, side rails up and locked X2. Call light and personal belongings within reach. No acute events overnight. Spouse remains at bedside; will continue to monitor.

## 2019-05-11 NOTE — HOSPITAL COURSE
Pt transfer from [facility]. Stable on admit. Concern for metastatic cancer given MRI findings. Repeat MRI confirms ring enhancing lesions. CT w/wo CAP ordered. Multiple chest masses. Adrenal mass. NSRG consulted and saw pt. No need for acute surgical intervention at this time per their recs. pulm and hem/onc consulted on day of DC for recs on outpatient bx and fu for cancer care. Will be scheduled for outpatient bronch vs extrapulmonary tissue biopsy per pulmonology.

## 2019-05-11 NOTE — HPI
80 y/o female with a 75 pack year history of tobacco + COPD (quit smoking in 2014, followed by outside Pulmonary-Dr. Enamorado), CAD, HTN, and HLD who originally presented to an OSH with slurred speech + gain ataxia, in the setting of forty pounds of unintentional weight loss-->subsequently found to have head imaging concerning for brain mets and [place] [facility] [place] for a NSGY evaluation (MRI confirmed a left cerebellar ring enhancing lesion, as well as additional lesions in the left frontal cortex lesion, no NSGY performed); however this was followed by  a CT Chest/Abdomen/Pelvis showing a 6 cm left hilar mass as well as a 2.2 cm left adrenal nodule. Pulmonary consulted for biopsy approach.    Patient states she had difficulty with her speech on her birthday which prompted her to seek medical attention in the hospital.  She also endorsed difficulty with her gait and 40 pounds of unintentional weight loss in the last 4 months.  She quit smoking in 2014.  Follows with Dr. Enamorado her Pulmonologist and is on Symbicort, as well as a PRN inhaler.

## 2019-05-11 NOTE — HPI
Rosita Almodovar is a 79 y.o. female with a history of HTN and HLD who presented to [facility] ED complaining of nausea for the past couple days. Pt endorsed decreased appetite, leg swelling, back pain, and decreased urine output. Pt also stated she fell last week. She denied head trauma and LOC, but endorsed left hip pain. ROS positive for 3 month history of unexplained weight loss, a 1 month history of increasing gait instability, and acute onset of dysarthria.  Neurology evaluated her and saw subcutaneous right parietal hematoma and noted dysarthria on exam.  She was worked up further with an MRI that showed both subacute left frontal infarct and ringlike lesion on left cerebellum; both of which could cause her dysarthria.  [place] [facility] [place].    AOx3, denies shortness of breath or pain.  Denies any focal weakness. Lives with her daughter.  At baseline she uses walker.  Started smoking at age 15 and stopped at age 74.  Averaged 0.5ppd.  Denies hemoptysis.

## 2019-05-11 NOTE — ASSESSMENT & PLAN NOTE
Cerebellar mass with ring enhancement.  Also noted to have similar frontal lesion as well.  Both suggestive of metastases.  CT chest with large left hilar mass and other findings per above.  Clinical history most suggestive of lung primary with metastases.  Reviewed CT with pulmonology who felt IR guided biopsy instead of EBUS would be ideal as the tumor extends towards periphery, which would make IR guided approach accessible and likely safer.  -Will arrange for outpatient IR guided biopsy of the lung and follow up 1 week after  -Will arrange for outpatient radiation oncology consult as well

## 2019-05-11 NOTE — CONSULTS
[facility]  Hematology/Oncology  Consult Note    Patient Name: Rosita Almodovar  MRN: [id number]  Admission Date: 5/10/2019  Hospital Length of Stay: 1 days  Code Status: Full Code   Attending Provider: Xena Bronson MD  Consulting Provider: Stephan Atkinson MD  Primary Care Physician: Florecita Jameson NP  Principal Problem:Cerebellar mass    Inpatient consult to Hematology/Oncology  Consult performed by: Stephan Atkinson MD  Consult ordered by: Jaquan Sherman MD        Subjective:     HPI:  Rosita Almodovar is a 79 y.o. female with a history of HTN and HLD who presented to [facility] ED complaining of nausea for the past couple days. Pt endorsed decreased appetite, leg swelling, back pain, and decreased urine output. Pt also stated she fell last week. She denied head trauma and LOC, but endorsed left hip pain. ROS positive for 3 month history of unexplained weight loss, a 1 month history of increasing gait instability, and acute onset of dysarthria.  Neurology evaluated her and saw subcutaneous right parietal hematoma and noted dysarthria on exam.  She was worked up further with an MRI that showed both subacute left frontal infarct and ringlike lesion on left cerebellum; both of which could cause her dysarthria.  Transferred to [facility] for [place]surgical evaluation.    AOx3, denies shortness of breath or pain.  Denies any focal weakness. Lives with her daughter.  At baseline she uses walker.  Started smoking at age 15 and stopped at age 74.  Averaged 0.5ppd.  Denies hemoptysis.    Oncology Treatment Plan:   [No treatment plan]    Medications:  Continuous Infusions:  Scheduled Meds:   atenolol  50 mg Oral Daily    fluticasone furoate-vilanterol  1 puff Inhalation Daily    levothyroxine  75 mcg Oral Before breakfast    pantoprazole  40 mg Oral Daily    pravastatin  20 mg Oral Daily    tiotropium  1 capsule Inhalation Daily     PRN Meds:acetaminophen, albuterol-ipratropium, clonazePAM, dextrose  50%, dextrose 50%, glucagon (human recombinant), glucose, glucose, omnipaque, ondansetron, sodium chloride 0.9%, traMADol     Review of patient's allergies indicates:  No Known Allergies     Past Medical History:   Diagnosis Date    Acquired hypothyroidism 2019    COPD (chronic obstructive pulmonary disease) 2019     History reviewed. No pertinent surgical history.  Family History     None        Tobacco Use    Smoking status: Former Smoker     Packs/day: 1.00     Years: 54.00     Pack years: 54.00     Types: Cigarettes     Last attempt to quit:      Years since quittin.3    Smokeless tobacco: Never Used   Substance and Sexual Activity    Alcohol use: No     Frequency: Never    Drug use: No    Sexual activity: Yes     Partners: Male       Review of Systems   Constitutional: Positive for unexpected weight change.   HENT: Negative.    Eyes: Negative.    Respiratory: Negative.    Cardiovascular: Negative.    Gastrointestinal: Negative.    Endocrine: Negative.    Genitourinary: Negative.    Musculoskeletal: Negative.    Allergic/Immunologic: Negative.    Neurological: Positive for dizziness, speech difficulty and weakness.   Hematological: Negative.    Psychiatric/Behavioral: Negative.      Objective:     Vital Signs (Most Recent):  Temp: 97.9 °F (36.6 °C) (19)  Pulse: 78 (19)  Resp: 18 (19)  BP: 128/60 (19)  SpO2: 95 % (19) Vital Signs (24h Range):  Temp:  [97.2 °F (36.2 °C)-97.9 °F (36.6 °C)] 97.9 °F (36.6 °C)  Pulse:  [74-82] 78  Resp:  [18-20] 18  SpO2:  [94 %-97 %] 95 %  BP: (125-141)/(60-70) 128/60     Weight: 59 kg (130 lb)  Body mass index is 24.17 kg/m².  Body surface area is 1.6 meters squared.      Intake/Output Summary (Last 24 hours) at 2019 1558  Last data filed at 5/10/2019 2300  Gross per 24 hour   Intake --   Output 250 ml   Net -250 ml       Physical Exam   Constitutional: She is oriented to person, place, and time. She  appears well-developed.   HENT:   Head: Normocephalic.   Cardiovascular: Normal rate, regular rhythm and normal heart sounds.   No murmur heard.  Pulmonary/Chest: Effort normal. No respiratory distress. She has no wheezes. She has no rales.   Diminished breath sounds left lung fields   Abdominal: Soft. Bowel sounds are normal. She exhibits no distension. There is no tenderness.   Musculoskeletal: Normal range of motion.   Neurological: She is alert and oriented to person, place, and time. No sensory deficit. She exhibits normal muscle tone. Coordination normal.   Skin: Skin is warm and dry.   Psychiatric: She has a normal mood and affect. Her behavior is normal.       Significant Labs:   CBC:   Recent Labs   Lab 05/10/19  0504 05/11/19  0315   WBC 7.86 7.15   HGB 11.7* 11.4*   HCT 36.0* 34.7*   * 368*    and CMP:   Recent Labs   Lab 05/10/19  0504 05/11/19 0315    137   K 3.3* 3.5    103   CO2 25 23   GLU 74 61*   BUN 13 8   CREATININE 1.0 0.7   CALCIUM 9.2 8.8   PROT 5.7* 5.2*   ALBUMIN 2.8* 2.5*   BILITOT 0.3 0.3   ALKPHOS 120 116   AST 28 24   ALT 6* 7*   ANIONGAP 11 11   EGFRNONAA 54.5* >60.0       Diagnostic Results:  I have reviewed all pertinent imaging results/findings within the past 24 hours.     5/10/19 CT chest abdomen pelvis  FINDINGS:  Neck base:Unremarkable.    Chest wall/axilla:Unremarkable.    Lungs/pleura/morgan: Mild emphysematous changes.  The left major bronchus is patent.  6.2 cm ill-defined heterogeneous left hilar mass occluding the left upper and lower lobar bronchi, encasing and narrowing the pulmonary vasculature.  There is associated collapse of the left lung and mild left-sided pleural effusion.    2-3 mm nodules in the right upper lobe (series 2, image 23 and image 30).  No right-sided pleural effusion.  No pneumothorax.    Heart/pericardium/mediastinum: Leftward shifting of the mediastinum.  Heart is normal size.  No pericardial effusion.  Coronary artery  atherosclerotic calcification.    Abdominal wall: Injection sites in the anterior abdominal wall.    Liver: Normal in size and contour.  No focal lesion.    Gallbladder/bile ducts: 1.5 cm gallstone without evidence of cholecystitis.  No intra or extrahepatic biliary ductal dilatation.    Spleen and pancreas are unremarkable.  New 2.2 soft tissue nodule in the left adrenal gland, consistent with metastases.  Right adrenal gland is unremarkable..    Kidneys/ureters: Normal in size and location.  Several bilateral renal cysts.  No hydronephrosis or ureteral dilatation.    Urinary bladder: Partially distended with smooth contour.    Reproductive: Status post hysterectomy.    Bowel/mesentery: Hiatal hernia.  No evidence of bowel obstruction or inflammation.  Diverticulosis coli without evidence of diverticulitis.    Peritoneal/retroperitoneum: No free intraperitoneal air or fluid.    Vasculature: 3 vessels left aortic arch.  Aortoiliac atherosclerotic calcification as well as at the origin of the celiac and superior mesenteric arteries.  Aorta is normal in course and caliber without aneurysmal dilatation, noting the ascending aorta is upper normal in caliber.  Portal, splenic and superior mesenteric veins are patent.  Note is made of retroaortic left renal vein.    Bones: Degenerative change of the spine with multilevel vacuum phenomena.  Mild dextroscoliosis of the lumbar spine.  Sclerotic focus in the right pubic symphysis, likely representing degenerative changes.      Impression       In this patient with history of brain metastasis, there is large left hilar mass occluding the left upper and lower lobar bronchi and narrowing the pulmonary vessels, consistent with lung cancer.  There is associated collapse of the left lung and leftward mediastinal shift.    New nodule in the left adrenal gland, consistent with metastases.    Scattered subcentimeter new nodules in the right lung, possibly representing metastasis or  infection.    Hiatal hernia.    Bilateral renal cysts.    Cholelithiasis without evidence of cholecystitis.    Additional findings as above.    RECIST SUMMARY:    Date of prior examination for comparison: No prior    Lesion 1: Left hilar.  6.2 cm. Series 2 image 32.  No prior measurements.    Lesion 2: Left adrenal.  2.2 cm. Series 2 image 81.  No prior measurements.    This report was flagged in Epic as abnormal.         Assessment/Plan:     * Cerebellar mass  Cerebellar mass with ring enhancement.  Also noted to have similar frontal lesion as well.  Both suggestive of metastases.  CT chest with large left hilar mass and other findings per above.  Clinical history most suggestive of lung primary with metastases.  Reviewed CT with pulmonology who felt IR guided biopsy instead of EBUS would be ideal as the tumor extends towards periphery, which would make IR guided approach accessible and likely safer.  -Will arrange for outpatient IR guided biopsy of the lung and follow up 1 week after  -Will arrange for outpatient radiation oncology consult as well          Thank you for your consult. I will sign off. Please contact us if you have any additional questions.    Stephan Atkinson MD  Hematology/Oncology  [facility]

## 2019-05-11 NOTE — ASSESSMENT & PLAN NOTE
78 y/o female with a 75 pack year history of tobacco + COPD (quit smoking in 2014, followed by outside Pulmonary-Dr. Enamorado), CAD, HTN, and HLD who originally presented to an OSH with slurred speech + gain ataxia, in the setting of forty pounds of unintentional weight loss    -->subsequently found to have head imaging concerning for brain mets and transferred to [facility] [place]10[place] for a NSGY evaluation (MRI confirmed a left cerebellar ring enhancing lesion, as well as additional lesions in the left frontal cortex lesion, no NSGY performed); however this was followed by  a CT Chest/Abdomen/Pelvis showing a 6 cm left hilar mass as well as a 2.2 cm left adrenal nodule. Pulmonary consulted for biopsy approach.    -Reviewed all imaging on file.  Imaging concerning for metastatic disease, in the setting of 40 pounds of unintentional weight loss and prominent smoking history.      -Recommend outpatient IR biopsy of left hilar mass followed by Hem/Onc appointment.    -Stable for discharge from a pulmonary standpoint.

## 2019-05-11 NOTE — SIGNIFICANT EVENT
Full consult note to follow.  Patientt seen on rounds and staffed with Attending: Dr. Wells.  Recommend IR biopsy and follow up in Hem/Onc clinic.

## 2019-05-11 NOTE — CONSULTS
[facility]  Pulmonology  Consult Note    Patient Name: Rosita Almodovar  MRN: [id number]  Admission Date: 5/10/2019  Hospital Length of Stay: 1 days  Code Status: Full Code  Attending Physician: Xena Bronson MD  Primary Care Provider: Florecita Jameson NP   Principal Problem: Cerebellar mass    Consults  Subjective:     HPI:  78 y/o female with a 75 pack year history of tobacco + COPD (quit smoking in , followed by outside Pulmonary-Dr. Enamorado), CAD, HTN, and HLD who originally presented to an OSH with slurred speech + gain ataxia, in the setting of forty pounds of unintentional weight loss-->subsequently found to have head imaging concerning for brain mets and transferred to [facility] on 5/10/19 for a NSGY evaluation (MRI confirmed a left cerebellar ring enhancing lesion, as well as additional lesions in the left frontal cortex lesion, no NSGY performed); however this was followed by  a CT Chest/Abdomen/Pelvis showing a 6 cm left hilar mass as well as a 2.2 cm left adrenal nodule. Pulmonary consulted for biopsy approach.    Patient states she had difficulty with her speech on her birthday which prompted her to seek medical attention in the hospital.  She also endorsed difficulty with her gait and 40 pounds of unintentional weight loss in the last 4 months.  She quit smoking in .  Follows with Dr. Enamorado her Pulmonologist and is on Symbicort, as well as a PRN inhaler.        Past Medical History:   Diagnosis Date    Acquired hypothyroidism 2019    COPD (chronic obstructive pulmonary disease) 2019       History reviewed. No pertinent surgical history.    Review of patient's allergies indicates:  No Known Allergies    Family History     None        Tobacco Use    Smoking status: Former Smoker     Packs/day: 1.00     Years: 54.00     Pack years: 54.00     Types: Cigarettes     Last attempt to quit:      Years since quittin.3    Smokeless tobacco: Never Used   Substance and  Sexual Activity    Alcohol use: No     Frequency: Never    Drug use: No    Sexual activity: Yes     Partners: Male         Review of Systems   Constitutional: Positive for unexpected weight change.   HENT: Negative.    Eyes: Negative.    Respiratory: Negative.    Cardiovascular: Negative.    Gastrointestinal: Negative.    Endocrine: Negative.    Genitourinary: Negative.    Musculoskeletal: Negative.    Allergic/Immunologic: Negative.    Neurological: Positive for dizziness, speech difficulty and weakness.   Hematological: Negative.    Psychiatric/Behavioral: Negative.      Objective:     Vital Signs (Most Recent):  Temp: 97.9 °F (36.6 °C) (05/11/19 0730)  Pulse: 78 (05/11/19 0730)  Resp: 18 (05/11/19 0730)  BP: 128/60 (05/11/19 0730)  SpO2: 95 % (05/11/19 0730) Vital Signs (24h Range):  Temp:  [97.2 °F (36.2 °C)-97.9 °F (36.6 °C)] 97.9 °F (36.6 °C)  Pulse:  [74-82] 78  Resp:  [18-20] 18  SpO2:  [94 %-97 %] 95 %  BP: (125-141)/(60-70) 128/60     Weight: 59 kg (130 lb)  Body mass index is 24.17 kg/m².      Intake/Output Summary (Last 24 hours) at 5/11/2019 1516  Last data filed at 5/10/2019 2300  Gross per 24 hour   Intake --   Output 250 ml   Net -250 ml       Physical Exam    Vents:       Lines/Drains/Airways          None          Significant Labs:    CBC/Anemia Profile:  Recent Labs   Lab 05/10/19  0504 05/11/19 0315   WBC 7.86 7.15   HGB 11.7* 11.4*   HCT 36.0* 34.7*   * 368*   MCV 88 87   RDW 13.2 13.2        Chemistries:  Recent Labs   Lab 05/10/19  0504 05/11/19 0315    137   K 3.3* 3.5    103   CO2 25 23   BUN 13 8   CREATININE 1.0 0.7   CALCIUM 9.2 8.8   ALBUMIN 2.8* 2.5*   PROT 5.7* 5.2*   BILITOT 0.3 0.3   ALKPHOS 120 116   ALT 6* 7*   AST 28 24   MG 1.3* 1.6   PHOS 2.1* 3.9         Assessment/Plan:     Abnormal CT scan, lung  80 y/o female with a 75 pack year history of tobacco + COPD (quit smoking in 2014, followed by outside Pulmonary-Dr. Enamorado), CAD, HTN, and HLD who originally  presented to an OSH with slurred speech + gain ataxia, in the setting of forty pounds of unintentional weight loss    -->subsequently found to have head imaging concerning for brain mets and transferred to [facility] on 5/10/19 for a NSGY evaluation (MRI confirmed a left cerebellar ring enhancing lesion, as well as additional lesions in the left frontal cortex lesion, no NSGY performed); however this was followed by  a CT Chest/Abdomen/Pelvis showing a 6 cm left hilar mass as well as a 2.2 cm left adrenal nodule. Pulmonary consulted for biopsy approach.    -Reviewed all imaging on file.  Imaging concerning for metastatic disease, in the setting of 40 pounds of unintentional weight loss and prominent smoking history.      -Recommend outpatient IR biopsy of left hilar mass followed by Hem/Onc appointment.    -Stable for discharge from a pulmonary standpoint.          Thank you for your consult. I will sign off. Please contact us if you have any additional questions.     Julio Bowles MD  Pulmonology  [facility]

## 2019-05-11 NOTE — SUBJECTIVE & OBJECTIVE
Past Medical History:   Diagnosis Date    Acquired hypothyroidism 2019    COPD (chronic obstructive pulmonary disease) 2019       History reviewed. No pertinent surgical history.    Review of patient's allergies indicates:  No Known Allergies    Family History     None        Tobacco Use    Smoking status: Former Smoker     Packs/day: 1.00     Years: 54.00     Pack years: 54.00     Types: Cigarettes     Last attempt to quit:      Years since quittin.3    Smokeless tobacco: Never Used   Substance and Sexual Activity    Alcohol use: No     Frequency: Never    Drug use: No    Sexual activity: Yes     Partners: Male         Review of Systems   Constitutional: Positive for unexpected weight change.   HENT: Negative.    Eyes: Negative.    Respiratory: Negative.    Cardiovascular: Negative.    Gastrointestinal: Negative.    Endocrine: Negative.    Genitourinary: Negative.    Musculoskeletal: Negative.    Allergic/Immunologic: Negative.    Neurological: Positive for dizziness, speech difficulty and weakness.   Hematological: Negative.    Psychiatric/Behavioral: Negative.      Objective:     Vital Signs (Most Recent):  Temp: 97.9 °F (36.6 °C) (19)  Pulse: 78 (19)  Resp: 18 (19)  BP: 128/60 (19)  SpO2: 95 % (19) Vital Signs (24h Range):  Temp:  [97.2 °F (36.2 °C)-97.9 °F (36.6 °C)] 97.9 °F (36.6 °C)  Pulse:  [74-82] 78  Resp:  [18-20] 18  SpO2:  [94 %-97 %] 95 %  BP: (125-141)/(60-70) 128/60     Weight: 59 kg (130 lb)  Body mass index is 24.17 kg/m².      Intake/Output Summary (Last 24 hours) at 2019 1516  Last data filed at 5/10/2019 2300  Gross per 24 hour   Intake --   Output 250 ml   Net -250 ml       Physical Exam    Vents:       Lines/Drains/Airways          None          Significant Labs:    CBC/Anemia Profile:  Recent Labs   Lab 05/10/19  0504 19  0315   WBC 7.86 7.15   HGB 11.7* 11.4*   HCT 36.0* 34.7*   * 368*   MCV 88 87    RDW 13.2 13.2        Chemistries:  Recent Labs   Lab 05/10/19  0504 05/11/19  0315    137   K 3.3* 3.5    103   CO2 25 23   BUN 13 8   CREATININE 1.0 0.7   CALCIUM 9.2 8.8   ALBUMIN 2.8* 2.5*   PROT 5.7* 5.2*   BILITOT 0.3 0.3   ALKPHOS 120 116   ALT 6* 7*   AST 28 24   MG 1.3* 1.6   PHOS 2.1* 3.9

## 2019-05-11 NOTE — NURSING
Patient and  received DC home instructions. They verbalized understanding. IV access removed. Pressure and dressing applied. No s/s of IV site complications noted. Patient denied any discomfort and left unit with  and transporter via wheelchair. No distress noted at this time.

## 2019-05-13 ENCOUNTER — NURSE TRIAGE (OUTPATIENT)
Dept: ADMINISTRATIVE | Facility: CLINIC | Age: 79
End: 2019-05-13

## 2019-05-13 ENCOUNTER — TELEPHONE (OUTPATIENT)
Dept: NEUROSURGERY | Facility: CLINIC | Age: 79
End: 2019-05-13

## 2019-05-13 LAB
ALBUMIN SERPL ELPH-MCNC: 2.73 G/DL (ref 3.35–5.55)
ALPHA1 GLOB SERPL ELPH-MCNC: 0.39 G/DL (ref 0.17–0.41)
ALPHA2 GLOB SERPL ELPH-MCNC: 0.76 G/DL (ref 0.43–0.99)
B-GLOBULIN SERPL ELPH-MCNC: 0.74 G/DL (ref 0.5–1.1)
GAMMA GLOB SERPL ELPH-MCNC: 0.67 G/DL (ref 0.67–1.58)
PATHOLOGIST INTERPRETATION SPE: NORMAL
PROT SERPL-MCNC: 5.3 G/DL (ref 6–8.4)

## 2019-05-13 NOTE — TELEPHONE ENCOUNTER
Was int the hospital last week, d/c on Saturday.  Found out she had cancer and wanted her to make an appt with outpatient ASAP.  She says nobody has called her, yet, so thought she would call.  Per d/c notes she needs to have a biopsy with IR then follow up with pulmonology, hem/onc.  Informed  of this and she will try to assist the patient.    Reason for Disposition   [1] Follow-up call to recent contact AND [2] information only call, no triage required    Protocols used: INFORMATION ONLY CALL-A-

## 2019-05-14 ENCOUNTER — OFFICE VISIT (OUTPATIENT)
Dept: NEUROSURGERY | Facility: CLINIC | Age: 79
End: 2019-05-14
Payer: MEDICARE

## 2019-05-14 ENCOUNTER — TELEPHONE (OUTPATIENT)
Dept: NEUROSURGERY | Facility: CLINIC | Age: 79
End: 2019-05-14

## 2019-05-14 VITALS
DIASTOLIC BLOOD PRESSURE: 67 MMHG | WEIGHT: 130.06 LBS | BODY MASS INDEX: 24.18 KG/M2 | TEMPERATURE: 99 F | HEART RATE: 81 BPM | SYSTOLIC BLOOD PRESSURE: 121 MMHG

## 2019-05-14 DIAGNOSIS — R91.8 LUNG MASS: Primary | ICD-10-CM

## 2019-05-14 LAB
INTERPRETATION SERPL IFE-IMP: NORMAL
PATHOLOGIST INTERPRETATION IFE: NORMAL

## 2019-05-14 PROCEDURE — 99999 PR PBB SHADOW E&M-EST. PATIENT-LVL III: CPT | Mod: PBBFAC,,, | Performed by: NEUROLOGICAL SURGERY

## 2019-05-14 PROCEDURE — 99214 PR OFFICE/OUTPT VISIT, EST, LEVL IV, 30-39 MIN: ICD-10-PCS | Mod: S$PBB,,, | Performed by: NEUROLOGICAL SURGERY

## 2019-05-14 PROCEDURE — 99213 OFFICE O/P EST LOW 20 MIN: CPT | Mod: PBBFAC | Performed by: NEUROLOGICAL SURGERY

## 2019-05-14 PROCEDURE — 99214 OFFICE O/P EST MOD 30 MIN: CPT | Mod: S$PBB,,, | Performed by: NEUROLOGICAL SURGERY

## 2019-05-14 PROCEDURE — 99999 PR PBB SHADOW E&M-EST. PATIENT-LVL III: ICD-10-PCS | Mod: PBBFAC,,, | Performed by: NEUROLOGICAL SURGERY

## 2019-05-14 RX ORDER — LEVOTHYROXINE SODIUM 50 UG/1
50 TABLET ORAL EVERY MORNING
Refills: 3 | COMMUNITY
Start: 2019-04-19 | End: 2019-10-25 | Stop reason: SDUPTHER

## 2019-05-14 RX ORDER — POTASSIUM CHLORIDE 750 MG/1
20 TABLET, EXTENDED RELEASE ORAL 2 TIMES DAILY
Refills: 3 | COMMUNITY
Start: 2019-04-27 | End: 2019-05-29

## 2019-05-14 RX ORDER — CLOPIDOGREL BISULFATE 75 MG/1
TABLET ORAL
Status: ON HOLD | COMMUNITY
Start: 2019-05-13 | End: 2019-06-04 | Stop reason: HOSPADM

## 2019-05-14 RX ORDER — ONDANSETRON 4 MG/1
TABLET, ORALLY DISINTEGRATING ORAL
Refills: 3 | COMMUNITY
Start: 2019-04-24 | End: 2019-05-29 | Stop reason: DRUGHIGH

## 2019-05-14 RX ORDER — ALBUTEROL SULFATE 90 UG/1
AEROSOL, METERED RESPIRATORY (INHALATION)
Status: ON HOLD | COMMUNITY
End: 2019-11-04 | Stop reason: HOSPADM

## 2019-05-14 NOTE — PROGRESS NOTES
Subjective:   I, Tammy Eng, attest that this documentation has been prepared under the direction and in the presence of Paul Corbett MD.     Patient ID: Rosita Almodovar is a 79 y.o. female     Chief Complaint: No chief complaint on file.      HPI  Ms. Rosita Almodovar is a pleasant 79 y.o. woman with a recently discovered brain mass and lung nodules who presents today for consultation. Pt recently presented to the [facility] ED with a chief complaint of dysarthria, 1 week after a mechanical fall. She states symptoms manifested while talking to her son over the phone. She was worked up with a MRI brain to r/o CVA and was found to have multiple brain lesions concerning for metastasis. She was also worked up with a CT chest/abd/pelvis which showed a lung mass. She is accompanied by her  today who reports she is due to follow up following up with pulmonology.       Review of Systems   Constitutional: Negative for activity change, fatigue, fever and unexpected weight change.   HENT: Negative for facial swelling.    Eyes: Negative.    Respiratory: Negative.    Cardiovascular: Negative.    Gastrointestinal: Negative for diarrhea, nausea and vomiting.   Genitourinary: Negative.    Musculoskeletal: Negative for back pain, joint swelling, myalgias and neck pain.   Neurological: Negative for dizziness, weakness, numbness and headaches.   Psychiatric/Behavioral: Negative.       Past Medical History:   Diagnosis Date    Acquired hypothyroidism 5/8/2019    Brain tumor     COPD (chronic obstructive pulmonary disease) 5/8/2019    Gastric ulcer     Hilar mass     Kidney cysts     left    Pleural effusion        Objective:      Vitals:    05/14/19 0923   BP: 121/67   Pulse: 81   Temp: 98.6 °F (37 °C)      Physical Exam   Constitutional: He is oriented to person, place, and time. He appears well-nourished.   HENT:   Head: Normocephalic and atraumatic.   Neck: Neck supple.   Cardiovascular: Normal  rate and regular rhythm.   Pulmonary/Chest: Effort normal and breath sounds normal. No respiratory distress.   Musculoskeletal: Normal range of motion. He exhibits no tenderness or deformity.   Lymphadenopathy:   No lymphadenopathy.   Neurological: He is alert and oriented to person, place, and time. He has normal strength. No cranial nerve deficit. He displays a negative Romberg sign. GCS eye subscore is 4. GCS verbal subscore is 5. GCS motor subscore is 6.   Skin: Skin is warm and dry.   Psychiatric: He has a normal mood and affect. His behavior is normal. Thought content normal.   Genitourinary: normal  Hematological: normal     IMAGING:  MRI Brain With Contrast (05/09/2019) demonstrates at least five diffuse tumors.    I have personally reviewed the images with the pt.      I, Dr. Paul Corbett, personally performed the services described in this documentation. All medical record entries made by the scribe, Tammy Eng, were at my direction and in my presence.  I have reviewed the chart and agree that the record reflects my personal performance and is accurate and complete. Paul Corbett MD. 05/14/2019    Assessment:       1. Lung mass         Plan:   I have personally reviewed the MRI brain with the pt which demonstrates at least five diffuse tumors.    I recommend screening pt for a clinical trial (-JEFFERY-PEN plus radiation and METIS ) and treating with gamma knife radiosurgery. I will contact Danyell shell who will discuss the logistics of the trial with the pt.

## 2019-05-14 NOTE — PATIENT INSTRUCTIONS
I have personally reviewed the MRI brain with the pt which demonstrates at least five diffuse tumors.    I recommend screening pt for a clinical trial (DM-JEFFERY-PEN plus radiation and METIS ) and treating with gamma knife radiosurgery. I will contact Danyell today who will discuss the logistics of the trial with the pt.

## 2019-05-15 LAB
T GONDII IGG SER QL IA: NORMAL
T GONDII IGG SERPL IA-ACNC: <5 IU/ML (ref 0–6.4)

## 2019-05-16 DIAGNOSIS — R91.8 LUNG MASS: Primary | ICD-10-CM

## 2019-05-17 ENCOUNTER — TELEPHONE (OUTPATIENT)
Dept: RADIATION ONCOLOGY | Facility: CLINIC | Age: 79
End: 2019-05-17

## 2019-05-17 NOTE — TELEPHONE ENCOUNTER
Informed pt that her appointment for 5/19 with Dr. Quintana is cancelled per Dr. Quintana. Informed her that she is seeing Dr. Corbett and that Dr. Quintana stated he did not need to see her.

## 2019-05-21 ENCOUNTER — TELEPHONE (OUTPATIENT)
Dept: INTERVENTIONAL RADIOLOGY/VASCULAR | Facility: HOSPITAL | Age: 79
End: 2019-05-21

## 2019-05-21 RX ORDER — FENTANYL CITRATE 50 UG/ML
50 INJECTION, SOLUTION INTRAMUSCULAR; INTRAVENOUS
Status: CANCELLED | OUTPATIENT
Start: 2019-05-21

## 2019-05-21 RX ORDER — SODIUM CHLORIDE 9 MG/ML
500 INJECTION, SOLUTION INTRAVENOUS ONCE
Status: CANCELLED | OUTPATIENT
Start: 2019-05-21 | End: 2019-05-21

## 2019-05-21 RX ORDER — MIDAZOLAM HYDROCHLORIDE 1 MG/ML
1 INJECTION INTRAMUSCULAR; INTRAVENOUS
Status: CANCELLED | OUTPATIENT
Start: 2019-05-21

## 2019-05-22 ENCOUNTER — HOSPITAL ENCOUNTER (OUTPATIENT)
Facility: HOSPITAL | Age: 79
Discharge: HOME | End: 2019-05-22
Attending: NEUROLOGICAL SURGERY | Admitting: NEUROLOGICAL SURGERY
Payer: MEDICARE

## 2019-05-22 VITALS
RESPIRATION RATE: 16 BRPM | DIASTOLIC BLOOD PRESSURE: 42 MMHG | SYSTOLIC BLOOD PRESSURE: 99 MMHG | TEMPERATURE: 98 F | OXYGEN SATURATION: 100 % | HEART RATE: 70 BPM

## 2019-05-22 DIAGNOSIS — R91.8 LUNG MASS: ICD-10-CM

## 2019-05-22 PROCEDURE — 88305 TISSUE EXAM BY PATHOLOGIST: CPT | Mod: 26,,, | Performed by: PATHOLOGY

## 2019-05-22 PROCEDURE — 88342 CYTOLOGY SPECIMEN-FNA-RADIOLOGY ASSISTED WITH PATH ADEQUACY-CORE BX: ICD-10-PCS | Mod: 26,,, | Performed by: PATHOLOGY

## 2019-05-22 PROCEDURE — 88341 CYTOLOGY SPECIMEN-FNA-RADIOLOGY ASSISTED WITH PATH ADEQUACY-CORE BX: ICD-10-PCS | Mod: 26,,, | Performed by: PATHOLOGY

## 2019-05-22 PROCEDURE — 88333 CYTOLOGY SPECIMEN-FNA-RADIOLOGY ASSISTED WITH PATH ADEQUACY-CORE BX: ICD-10-PCS | Mod: 26,,, | Performed by: PATHOLOGY

## 2019-05-22 PROCEDURE — 88341 IMHCHEM/IMCYTCHM EA ADD ANTB: CPT | Mod: 26,,, | Performed by: PATHOLOGY

## 2019-05-22 PROCEDURE — 88342 IMHCHEM/IMCYTCHM 1ST ANTB: CPT | Performed by: PATHOLOGY

## 2019-05-22 PROCEDURE — 88342 IMHCHEM/IMCYTCHM 1ST ANTB: CPT | Mod: 26,,, | Performed by: PATHOLOGY

## 2019-05-22 PROCEDURE — 63600175 PHARM REV CODE 636 W HCPCS: Performed by: RADIOLOGY

## 2019-05-22 PROCEDURE — 88305 TISSUE EXAM BY PATHOLOGIST: CPT | Performed by: PATHOLOGY

## 2019-05-22 PROCEDURE — 88341 IMHCHEM/IMCYTCHM EA ADD ANTB: CPT | Mod: 59 | Performed by: PATHOLOGY

## 2019-05-22 PROCEDURE — 88305 CYTOLOGY SPECIMEN-FNA-RADIOLOGY ASSISTED WITH PATH ADEQUACY-CORE BX: ICD-10-PCS | Mod: 26,,, | Performed by: PATHOLOGY

## 2019-05-22 PROCEDURE — 88333 PATH CONSLTJ SURG CYTO XM 1: CPT | Mod: 26,,, | Performed by: PATHOLOGY

## 2019-05-22 RX ORDER — FENTANYL CITRATE 50 UG/ML
INJECTION, SOLUTION INTRAMUSCULAR; INTRAVENOUS CODE/TRAUMA/SEDATION MEDICATION
Status: COMPLETED | OUTPATIENT
Start: 2019-05-22 | End: 2019-05-22

## 2019-05-22 RX ADMIN — FENTANYL CITRATE 50 MCG: 50 INJECTION, SOLUTION INTRAMUSCULAR; INTRAVENOUS at 08:05

## 2019-05-22 NOTE — PROGRESS NOTES
Pt arrived  for Lung Biopsy. No acute distress noted. Orders, labs and consent reviewed. MD notified.

## 2019-05-22 NOTE — NURSING
MD Strickland verbalized pt may be dc'd home, d/c instructions given, PIV dc'd,   @ bs, pt left via private WC with  in NAD

## 2019-05-22 NOTE — PROGRESS NOTES
Pt left  lung biopsy complete. Incision site CDI. VSS. Pt to [facility]. Report given to [facility] RN. Family notified.

## 2019-05-22 NOTE — PROCEDURES
Radiology Post-Procedure Note    Pre Op Diagnosis: L lung mass  Post Op Diagnosis: Same    Procedure: biopsy    Procedure performed by: Onel Esquivel MD    Written Informed Consent Obtained: Yes  Specimen Removed: YES 4 core specimens  Estimated Blood Loss: Minimal    Findings:   Successful L lung mass biopsy    Patient tolerated procedure well.    @SIG@

## 2019-05-22 NOTE — DISCHARGE SUMMARY
Radiology Discharge Summary      Hospital Course: No complications    Admit Date: 5/22/2019  Discharge Date: 05/22/2019     Instructions Given to Patient: Yes  Diet: Resume prior diet  Activity: activity as tolerated and no driving for today    Description of Condition on Discharge: Stable  Vital Signs (Most Recent): Temp: 97.8 °F (36.6 °C) (05/22/19 0840)  Pulse: 70 (05/22/19 0941)  Resp: 16 (05/22/19 0941)  BP: (!) 97/55 (05/22/19 0941)  SpO2: 99 % (05/22/19 0941)    Discharge Disposition: Home    Discharge Diagnosis: lung mass     Follow-up: per oncology    @SIG@

## 2019-05-22 NOTE — H&P
Radiology History & Physical      SUBJECTIVE:     Chief Complaint: Lung mass    History of Present Illness:  Rosita Almodovar is a 79 y.o. female who presents for lef tlung mass biopsy  Past Medical History:   Diagnosis Date    Acquired hypothyroidism 5/8/2019    Brain tumor     COPD (chronic obstructive pulmonary disease) 5/8/2019    Gastric ulcer     Hilar mass     Kidney cysts     left    Pleural effusion      Past Surgical History:   Procedure Laterality Date    ANGIOGRAM, CORONARY, WITH LEFT HEART CATHETERIZATION      endoscope      kidney cyst excision and drainage         Home Meds:   Prior to Admission medications    Medication Sig Start Date End Date Taking? Authorizing Provider   albuterol (VENTOLIN HFA) 90 mcg/actuation inhaler Ventolin HFA 90 mcg/actuation aerosol inhaler   Yes Historical Provider, MD   atenolol (TENORMIN) 50 MG tablet Take 50 mg by mouth once daily. 7/14/18  Yes Historical Provider, MD   budesonide-formoterol 160-4.5 mcg (SYMBICORT) 160-4.5 mcg/actuation HFAA Inhale 2 puffs into the lungs every 12 (twelve) hours. Controller 2/18/19 2/18/20 Yes Miguel A Enamorado MD   clonazePAM (KLONOPIN) 0.5 MG tablet TAKE ONE TABLET BY MOUTH THREE TIMES DAILY AS NEEDED FOR ANXIETY OR for panic attacks 8/23/18  Yes Historical Provider, MD   clopidogrel (PLAVIX) 75 mg tablet  5/13/19  Yes Historical Provider, MD   ergocalciferol (VITAMIN D2) 50,000 unit Cap Take 50,000 Units by mouth every 7 days. Tuesday   Yes Historical Provider, MD   furosemide (LASIX) 20 MG tablet Take 20 mg by mouth once daily.  9/4/18  Yes Historical Provider, MD   levothyroxine (SYNTHROID) 50 MCG tablet Take 50 mcg by mouth every morning. 4/19/19  Yes Historical Provider, MD   levothyroxine (SYNTHROID) 75 MCG tablet Take 75 mcg by mouth before breakfast.  9/10/18  Yes Historical Provider, MD   lisinopril (PRINIVIL,ZESTRIL) 5 MG tablet Take 5 mg by mouth once daily.  9/7/18  Yes Historical Provider, MD   ondansetron  (ZOFRAN-ODT) 4 MG TbDL DISSOLVE ONE TABLET BY MOUTH UNDER THE TONGUE EVERY 8 HOURS AS NEEDED FOR NAUSEA AND FOR VOMITING 4/24/19  Yes Historical Provider, MD   pantoprazole (PROTONIX) 40 MG tablet TAKE ONE TABLET BY MOUTH EVERY DAY FOR stomach 8/17/18  Yes Historical Provider, MD   pravastatin (PRAVACHOL) 20 MG tablet Take 20 mg by mouth once daily.  9/4/18  Yes Historical Provider, MD   sotalol (BETAPACE) 80 MG tablet TAKE ONE TABLET BY MOUTH TWICE DAILY FOR BLOOD PRESSURE AND HEART 7/14/18  Yes Historical Provider, MD   tiotropium bromide 2.5 mcg/actuation Mist Inhale 5 mcg into the lungs once daily. Controller 9/11/18  Yes Miguel A Enamorado MD   traMADol (ULTRAM) 50 mg tablet Take 50 mg by mouth 2 (two) times daily. 8/23/18  Yes Historical Provider, MD   DECARA 50,000 unit capsule Take 50,000 Units by mouth every 7 days. 1/31/19   Historical Provider, MD   potassium chloride (KLOR-CON) 10 MEQ TbSR Take 20 mEq by mouth 2 (two) times daily. 4/27/19   Historical Provider, MD     Anticoagulants/Antiplatelets: no anticoagulation    Allergies:   Review of patient's allergies indicates:   Allergen Reactions    Potassium Nausea Only     Sedation History:  no adverse reactions    Review of Systems:   Hematological: no known coagulopathies  Respiratory: no shortness of breath  Cardiovascular: no chest pain  Gastrointestinal: no abdominal pain  Genito-Urinary: no dysuria  Musculoskeletal: negative  Neurological: no TIA or stroke symptoms         OBJECTIVE:     Vital Signs (Most Recent)  Temp: 97.5 °F (36.4 °C) (05/22/19 0735)  Pulse: 66 (05/22/19 0735)  Resp: 18 (05/22/19 0735)  BP: (!) 104/54 (05/22/19 0735)  SpO2: 99 % (05/22/19 0735)    Physical Exam:  ASA: 2  Mallampati: 2    General: no acute distress  Mental Status: alert and oriented to person, place and time  HEENT: normocephalic, atraumatic  Chest: unlabored breathing  Heart: regular heart rate  Abdomen: nondistended  Extremity: moves all  "extremities    Laboratory  Lab Results   Component Value Date    INR 1.0 05/10/2019       Lab Results   Component Value Date    WBC 7.15 05/11/2019    HGB 11.4 (L) 05/11/2019    HCT 34.7 (L) 05/11/2019    MCV 87 05/11/2019     (H) 05/11/2019      Lab Results   Component Value Date    GLU 61 (L) 05/11/2019     05/11/2019    K 3.5 05/11/2019     05/11/2019    CO2 23 05/11/2019    BUN 8 05/11/2019    CREATININE 0.7 05/11/2019    CALCIUM 8.8 05/11/2019    MG 1.6 05/11/2019    ALT 7 (L) 05/11/2019    AST 24 05/11/2019    ALBUMIN 2.5 (L) 05/11/2019    BILITOT 0.3 05/11/2019       ASSESSMENT/PLAN:     Sedation Plan: moderate  Patient will undergo left lung mass biopsy.    Homero Strickland MD ([place])  Radiology PGY-5  [phone]      "

## 2019-05-27 ENCOUNTER — TELEPHONE (OUTPATIENT)
Dept: NEUROSURGERY | Facility: CLINIC | Age: 79
End: 2019-05-27

## 2019-05-27 PROBLEM — C34.90 METASTATIC PRIMARY LUNG CANCER: Status: ACTIVE | Noted: 2019-05-27

## 2019-05-27 PROBLEM — C34.92 PRIMARY MALIGNANT NEOPLASM OF LEFT LUNG METASTATIC TO OTHER SITE: Status: ACTIVE | Noted: 2019-05-27

## 2019-05-27 NOTE — PROGRESS NOTES
PATIENT: Rosita Almodovar  MRN: [id number]  DATE: 5/27/2019      Diagnosis:   1. Primary malignant neoplasm of left lung metastatic to other site    2. Small cell lung cancer, left        Chief Complaint: No chief complaint on file.      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  See comment.  Comment:  The left lung biopsy shows a malignant proliferation of cells with increased nuclear cytoplasmic ratio, stippled chromatin pattern with small amount of cytoplasm. Some of cells show molding and crush artifact. The majority of the cells are relatively small in size, however there are a few areas with larger cells within increased amount of cytoplasm. The cells are arranged in sheets and nests with brisk mitotic activity and areas of necrosis. Immunohistochemical stains reveal the tumor cells to stain positively with cytokeratin AE1/AE3, synaptophysin, chromogranin and TTF-1. Immunohistochemical stain for p63 is negative within the tumor cells. Immunostain for cytokeratin 7 shows positive staining in the background lung but is negative within tumor cells. All stains have atisfactory positive negative controls. 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 Correlate clinically.        Subjective:    Interval History: Ms. Almodovar is here for new diagnosis of extensive stage small cell lung cancer.    Rosita Almodovar is a 79 y.o. female with a history of HTN and HLD who  "presented to [facility] ED complaining of nausea for the past couple days. Pt reported decreased appetite, leg swelling, back pain, and decreased urine output. Pt also stated she fell last week. She denied head trauma and LOC, but endorsed left hip pain. ROS positive for 3 month history of unexplained weight loss, a 1 month history of increasing gait instability, and acute onset of dysarthria.  Neurology evaluated her and saw subcutaneous right parietal hematoma and noted dysarthria on exam.  She was worked up further with an MRI that showed both subacute left frontal infarct and ringlike lesion on left cerebellum; both of which could cause her dysarthria.  Transferred to [facility] for neurosurgical evaluation.    Neurosurgery evaluated the patient and did not recommend acute surgical intervention. PT evaluated the patient and their impression is as follows: "following impairments/functional limitations: weakness, gait instability, impaired balance, impaired endurance, impaired functional mobilty.   She was able to ambulate 50 feet with RW and CGA with no visible ataxia or LOB.  She would benefit from additional skilled PT services during this hospital admission. She is safe to d/c home and follow up with HH when medically appropriate."  She was seen for follow up in Dr. Corbett's clinic who recommended screening pt for a clinical trial (DM-JEFFERY-PEN plus radiation and METIS ) and treating with gamma knife radiosurgery.  She subsequently underwent lung biopsy on 5/22/19 that revealed high grade small cell lung cancer with concurrent features of high grade large cell neuroendocrine cells as well.    She is feeling fatigued and has dyspnea on exertion.  She is weak and needs assistance with ADLs but is still mobile with her walker.  Denies any focal unilateral weakness or change in vision.  Has orthostatic symptoms everytime she changes from sitting to a standing position.  Has occasional intermittent paresthesias in her right hand " when she sleeps at night (only occurs when in bed). Denies any fevers, chills, or nightsweats.  Has low appetite and has lost weight because of it.  Denies hemoptysis.    Physical therapy has not yet visited the home  Her  accompanies her at this visit.    Past Medical History:   Past Medical History:   Diagnosis Date    Acquired hypothyroidism 5/8/2019    Brain tumor     COPD (chronic obstructive pulmonary disease) 5/8/2019    Gastric ulcer     Hilar mass     Kidney cysts     left    Pleural effusion        Past Surgical HIstory:   Past Surgical History:   Procedure Laterality Date    ANGIOGRAM, CORONARY, WITH LEFT HEART CATHETERIZATION      endoscope      kidney cyst excision and drainage         Family History: No family history on file.    Social History:  reports that she quit smoking about 5 years ago. Her smoking use included cigarettes. She has a 54.00 pack-year smoking history. She has never used smokeless tobacco. She reports that she does not drink alcohol or use drugs.    Allergies:  Review of patient's allergies indicates:   Allergen Reactions    Potassium Nausea Only       Medications:  Current Outpatient Medications   Medication Sig Dispense Refill    albuterol (VENTOLIN HFA) 90 mcg/actuation inhaler Ventolin HFA 90 mcg/actuation aerosol inhaler      atenolol (TENORMIN) 50 MG tablet Take 50 mg by mouth once daily.  1    budesonide-formoterol 160-4.5 mcg (SYMBICORT) 160-4.5 mcg/actuation HFAA Inhale 2 puffs into the lungs every 12 (twelve) hours. Controller 1 Inhaler 11    clonazePAM (KLONOPIN) 0.5 MG tablet TAKE ONE TABLET BY MOUTH THREE TIMES DAILY AS NEEDED FOR ANXIETY OR for panic attacks  3    clopidogrel (PLAVIX) 75 mg tablet       DECARA 50,000 unit capsule Take 50,000 Units by mouth every 7 days.  3    ergocalciferol (VITAMIN D2) 50,000 unit Cap Take 50,000 Units by mouth every 7 days. Tuesday      furosemide (LASIX) 20 MG tablet Take 20 mg by mouth once daily.        levothyroxine (SYNTHROID) 50 MCG tablet Take 50 mcg by mouth every morning.  3    levothyroxine (SYNTHROID) 75 MCG tablet Take 75 mcg by mouth before breakfast.       lisinopril (PRINIVIL,ZESTRIL) 5 MG tablet Take 5 mg by mouth once daily.       ondansetron (ZOFRAN-ODT) 4 MG TbDL DISSOLVE ONE TABLET BY MOUTH UNDER THE TONGUE EVERY 8 HOURS AS NEEDED FOR NAUSEA AND FOR VOMITING  3    pantoprazole (PROTONIX) 40 MG tablet TAKE ONE TABLET BY MOUTH EVERY DAY FOR stomach  3    potassium chloride (KLOR-CON) 10 MEQ TbSR Take 20 mEq by mouth 2 (two) times daily.  3    pravastatin (PRAVACHOL) 20 MG tablet Take 20 mg by mouth once daily.       sotalol (BETAPACE) 80 MG tablet TAKE ONE TABLET BY MOUTH TWICE DAILY FOR BLOOD PRESSURE AND HEART  1    tiotropium bromide 2.5 mcg/actuation Mist Inhale 5 mcg into the lungs once daily. Controller 4 g 5    traMADol (ULTRAM) 50 mg tablet Take 50 mg by mouth 2 (two) times daily.  3     No current facility-administered medications for this visit.        Review of Systems   Constitutional: Positive for activity change, appetite change, fatigue and unexpected weight change. Negative for chills and fever.   HENT: Negative for nosebleeds.    Eyes: Negative for visual disturbance.   Respiratory: Positive for shortness of breath. Negative for cough.    Cardiovascular: Negative for chest pain and leg swelling.   Gastrointestinal: Negative for abdominal distention, abdominal pain, constipation, diarrhea and nausea.   Endocrine: Negative for cold intolerance and heat intolerance.   Genitourinary: Negative for dysuria.   Musculoskeletal: Positive for back pain (chronic unchanged).   Skin: Negative for color change and rash.   Neurological: Positive for dizziness, weakness, light-headedness and numbness.   Hematological: Negative for adenopathy.   Psychiatric/Behavioral: Negative for confusion.       ECOG Performance Status:  3  Objective:      Vitals:   Vitals:    05/29/19 0844   BP: (!)  "111   Pulse: 77   Temp: 98.3 °F (36.8 °C)   Weight: 55.1 kg (121 lb 7.6 oz)   Height: 5' 1.5" (1.562 m)     BMI: Body mass index is 22.58 kg/m².   Wt Readings from Last 10 Encounters:   19 54.9 kg (121 lb)   19 55.1 kg (121 lb 7.6 oz)   19 59 kg (130 lb 1.1 oz)   05/10/19 59 kg (130 lb)   19 59.3 kg (130 lb 11.7 oz)   19 59.9 kg (132 lb)   19 64.2 kg (141 lb 8.6 oz)   18 65.8 kg (145 lb)   18 65.8 kg (145 lb)   18 66 kg (145 lb 8.1 oz)         Physical Exam   Constitutional: She appears well-developed.   HENT:   Head: Normocephalic.   Eyes: Pupils are equal, round, and reactive to light. EOM are normal. No scleral icterus.   Neck: Normal range of motion. No tracheal deviation present.   Cardiovascular: Normal rate, regular rhythm and normal heart sounds. Exam reveals no gallop and no friction rub.   No murmur heard.  Pulmonary/Chest: Effort normal. No respiratory distress. She has no wheezes. She has no rales.   Diminished breath sounds left upper and lower lobes   Abdominal: Soft. Bowel sounds are normal. She exhibits no distension and no mass. There is no tenderness. There is no guarding.   Musculoskeletal: Normal range of motion. She exhibits no edema.   Lymphadenopathy:     She has no cervical adenopathy.   Neurological: She is alert.   Skin: Skin is warm and dry.       Laboratory Data: Results for CASI MACARIO (MRN [id number]) as of 2019 08:45   Ref. Range 2019 04:35   TSH Latest Ref Range: 0.45 - 5.33 uIU/mL 0.86   Results for CASI MACARIO (MRN [id number]) as of 2019 08:45   Ref. Range 5/10/2019 05:04   Hemoglobin A1C External Latest Ref Range: 4.0 - 5.6 % 5.7 (H)       Imagin/10/19 CT chest abdomen pelvis  FINDINGS:  Neck base:Unremarkable.    Chest wall/axilla:Unremarkable.    Lungs/pleura/morgan: Mild emphysematous changes.  The left major bronchus is patent.  6.2 cm ill-defined heterogeneous left hilar mass occluding the left " upper and lower lobar bronchi, encasing and narrowing the pulmonary vasculature.  There is associated collapse of the left lung and mild left-sided pleural effusion.    2-3 mm nodules in the right upper lobe (series 2, image 23 and image 30).  No right-sided pleural effusion.  No pneumothorax.    Heart/pericardium/mediastinum: Leftward shifting of the mediastinum.  Heart is normal size.  No pericardial effusion.  Coronary artery atherosclerotic calcification.    Abdominal wall: Injection sites in the anterior abdominal wall.    Liver: Normal in size and contour.  No focal lesion.    Gallbladder/bile ducts: 1.5 cm gallstone without evidence of cholecystitis.  No intra or extrahepatic biliary ductal dilatation.    Spleen and pancreas are unremarkable.  New 2.2 soft tissue nodule in the left adrenal gland, consistent with metastases.  Right adrenal gland is unremarkable..    Kidneys/ureters: Normal in size and location.  Several bilateral renal cysts.  No hydronephrosis or ureteral dilatation.    Urinary bladder: Partially distended with smooth contour.    Reproductive: Status post hysterectomy.    Bowel/mesentery: Hiatal hernia.  No evidence of bowel obstruction or inflammation.  Diverticulosis coli without evidence of diverticulitis.    Peritoneal/retroperitoneum: No free intraperitoneal air or fluid.    Vasculature: 3 vessels left aortic arch.  Aortoiliac atherosclerotic calcification as well as at the origin of the celiac and superior mesenteric arteries.  Aorta is normal in course and caliber without aneurysmal dilatation, noting the ascending aorta is upper normal in caliber.  Portal, splenic and superior mesenteric veins are patent.  Note is made of retroaortic left renal vein.    Bones: Degenerative change of the spine with multilevel vacuum phenomena.  Mild dextroscoliosis of the lumbar spine.  Sclerotic focus in the right pubic symphysis, likely representing degenerative changes.      Impression       In this  patient with history of brain metastasis, there is large left hilar mass occluding the left upper and lower lobar bronchi and narrowing the pulmonary vessels, consistent with lung cancer.  There is associated collapse of the left lung and leftward mediastinal shift.    New nodule in the left adrenal gland, consistent with metastases.    Scattered subcentimeter new nodules in the right lung, possibly representing metastasis or infection.    Hiatal hernia.    Bilateral renal cysts.    Cholelithiasis without evidence of cholecystitis.    Additional findings as above.    RECIST SUMMARY:    Date of prior examination for comparison: No prior    Lesion 1: Left hilar.  6.2 cm. Series 2 image 32.  No prior measurements.    Lesion 2: Left adrenal.  2.2 cm. Series 2 image 81.  No prior measurements.    This report was flagged in Epic as abnormal.       5/9/19 MRI brain  COMPARISON:  MRI brain without contrast 05/08/2019.    FINDINGS:  Ring-like lesion cerebellum on the left as seen on the noncontrast MRI exam demonstrates mild ring enhancement on the postcontrast MR images measuring 1.4 x 1 x 1 cm.  Small ring enhancing focus also evident at the posteromedial left frontal cortex at site of diffusion restriction seen on the noncontrast images measuring 5-6 mm and in light of the ring-enhancing lesion in the cerebellum may reflect 2nd parenchymal lesion rather than infarction despite associated diffusion restriction.  No other enhancing parenchymal lesion identified.    Mottled marrow signal of the calvarium is evident with more pronounced diminished T1 marrow signal in the right parietal bone specifically.  Overlying soft tissue mass abuts the outer table right parietal region as seen on the noncontrast exam measuring approximately 3 x 0.7 x 3 cm with mild diffuse enhancement noted on the current postcontrast images.    Also noted is focal diminished T1 marrow signal left body of C2 with perhaps subtle mottled  enhancement.    As such given presence of two ring enhancing parenchymal lesions, mildly enhancing right parietal scalp lesion and underlying abnormal calvarial marrow signal and suspected focal marrow lesion body of C2, the concern for metastatic disease to brain and bone is raised.      Impression       In correlation with noncontrast MRI images of brain 05/08/2019 evident is ring enhancement of previously demonstrated left cerebellar lesion and ring enhancement of demonstrated small lesion with diffusion restriction posteromedial left frontal cortex as seen on the prior MR images.  Given multiplicity of parenchymal lesions possibility of metastatic disease is raised.    Additionally, diffusely mottled appearance of the calvarial marrow is evident on the noncontrast T1 W images of 05/08/2019 as well as the current postcontrast images with more pronounced diminished T1 marrow signal right parietal region with overlying scalp soft tissue mass at the posterior right parietal convexity which demonstrates mild enhancement.  Focal T1 hypointense lesion also suspected at the C2 level.  As such the possibility of a marrow replacement disorder is raised including possibility of osseous metastases.  Described scalp soft tissue mass is nonspecific by MR imaging.       Assessment:       1. Primary malignant neoplasm of left lung metastatic to other site    2. Small cell lung cancer, left           Plan:       1. 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  While treating small cell lung cancer is the priority, typical approach to purely large cell neuroendocrine is similar to that of NSCLC.  Small cell lung cancer treatment overlaps with that of NSCLC with platinum and immunotherapy components.  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  -Plan for carboplatin, etoposide, and atezolizumab.  Authorization pending.  Tentatively plan for C1D1 6/5/19  --Refer to surgery for port.   Ideally performed prior to 6/5/19.  --Instructed patient to stop plavix.  She has not taken her medications yet today so her last dose was 5/28/19.  -If asymptomatic, defer on whole brain radiation for now, will re-image body and brain after 4 cycles, and then refer to radiation oncology for WBRT.  -Return to clinic 6/5/19 with cmp, cbc  --Monitor TSH and HbA1c q4 cycles    Orders Placed This Encounter   Procedures    Comprehensive metabolic panel    CBC auto differential    Ambulatory consult to General Surgery         Stephan Atkinson MD  Hematology Oncology Fellow PGY5  Pager [phone]  Distress Screening Results: Psychosocial Distress screening score of   noted and reviewed. No intervention indicated.

## 2019-05-27 NOTE — PLAN OF CARE
START ON PATHWAY REGIMEN - Small Cell Lung    [id number]        Atezolizumab (Tecentriq(R))       Carboplatin (Paraplatin(R))       Etoposide (Toposar(R))       Atezolizumab (Tecentriq(R))     **Always confirm dose/schedule in your pharmacy ordering system**        Patient Characteristics:  Extensive Stage, First Line  Stage Classification: Extensive  AJCC T Category: TX  AJCC N Category: NX  AJCC M Category: M1c  AJCC 8 Stage Grouping: IVB  Line of therapy: First Line  Intent of Therapy:  Non-Curative / Palliative Intent, Discussed with Patient

## 2019-05-29 ENCOUNTER — OFFICE VISIT (OUTPATIENT)
Dept: NEUROSURGERY | Facility: CLINIC | Age: 79
End: 2019-05-29
Payer: MEDICARE

## 2019-05-29 ENCOUNTER — OFFICE VISIT (OUTPATIENT)
Dept: HEMATOLOGY/ONCOLOGY | Facility: CLINIC | Age: 79
End: 2019-05-29
Payer: MEDICARE

## 2019-05-29 ENCOUNTER — TELEPHONE (OUTPATIENT)
Dept: HEMATOLOGY/ONCOLOGY | Facility: CLINIC | Age: 79
End: 2019-05-29

## 2019-05-29 ENCOUNTER — LAB VISIT (OUTPATIENT)
Dept: LAB | Facility: HOSPITAL | Age: 79
End: 2019-05-29
Payer: MEDICARE

## 2019-05-29 ENCOUNTER — OFFICE VISIT (OUTPATIENT)
Dept: SURGERY | Facility: CLINIC | Age: 79
End: 2019-05-29
Payer: MEDICARE

## 2019-05-29 VITALS
DIASTOLIC BLOOD PRESSURE: 62 MMHG | TEMPERATURE: 98 F | WEIGHT: 121 LBS | HEART RATE: 75 BPM | HEIGHT: 61 IN | SYSTOLIC BLOOD PRESSURE: 113 MMHG | BODY MASS INDEX: 22.84 KG/M2

## 2019-05-29 VITALS
WEIGHT: 121.5 LBS | BODY MASS INDEX: 22.36 KG/M2 | SYSTOLIC BLOOD PRESSURE: 111 MMHG | TEMPERATURE: 98 F | HEIGHT: 62 IN | HEART RATE: 77 BPM | DIASTOLIC BLOOD PRESSURE: 59 MMHG

## 2019-05-29 VITALS
DIASTOLIC BLOOD PRESSURE: 57 MMHG | BODY MASS INDEX: 22.26 KG/M2 | SYSTOLIC BLOOD PRESSURE: 98 MMHG | WEIGHT: 121 LBS | HEIGHT: 62 IN | TEMPERATURE: 98 F | HEART RATE: 75 BPM

## 2019-05-29 DIAGNOSIS — C34.92 PRIMARY MALIGNANT NEOPLASM OF LEFT LUNG METASTATIC TO OTHER SITE: ICD-10-CM

## 2019-05-29 DIAGNOSIS — N17.9 AKI (ACUTE KIDNEY INJURY): ICD-10-CM

## 2019-05-29 DIAGNOSIS — G93.89 BRAIN MASS: ICD-10-CM

## 2019-05-29 DIAGNOSIS — J44.9 CHRONIC OBSTRUCTIVE PULMONARY DISEASE, UNSPECIFIED COPD TYPE: ICD-10-CM

## 2019-05-29 DIAGNOSIS — E86.0 DEHYDRATION: Primary | ICD-10-CM

## 2019-05-29 DIAGNOSIS — C34.92 SMALL CELL LUNG CANCER, LEFT: ICD-10-CM

## 2019-05-29 DIAGNOSIS — C34.92 SMALL CELL LUNG CANCER, LEFT: Primary | ICD-10-CM

## 2019-05-29 DIAGNOSIS — G93.89 CEREBELLAR MASS: Primary | ICD-10-CM

## 2019-05-29 DIAGNOSIS — R91.8 LUNG MASS: ICD-10-CM

## 2019-05-29 DIAGNOSIS — I10 ESSENTIAL HYPERTENSION: ICD-10-CM

## 2019-05-29 LAB
ALBUMIN SERPL BCP-MCNC: 3.2 G/DL (ref 3.5–5.2)
ALP SERPL-CCNC: 144 U/L (ref 55–135)
ALT SERPL W/O P-5'-P-CCNC: 6 U/L (ref 10–44)
ANION GAP SERPL CALC-SCNC: 12 MMOL/L (ref 8–16)
AST SERPL-CCNC: 32 U/L (ref 10–40)
BASOPHILS # BLD AUTO: 0.05 K/UL (ref 0–0.2)
BASOPHILS NFR BLD: 0.6 % (ref 0–1.9)
BILIRUB SERPL-MCNC: 0.6 MG/DL (ref 0.1–1)
BUN SERPL-MCNC: 13 MG/DL (ref 8–23)
CALCIUM SERPL-MCNC: 10.1 MG/DL (ref 8.7–10.5)
CHLORIDE SERPL-SCNC: 87 MMOL/L (ref 95–110)
CO2 SERPL-SCNC: 33 MMOL/L (ref 23–29)
CREAT SERPL-MCNC: 1.2 MG/DL (ref 0.5–1.4)
DIFFERENTIAL METHOD: ABNORMAL
EOSINOPHIL # BLD AUTO: 0 K/UL (ref 0–0.5)
EOSINOPHIL NFR BLD: 0.5 % (ref 0–8)
ERYTHROCYTE [DISTWIDTH] IN BLOOD BY AUTOMATED COUNT: 13.2 % (ref 11.5–14.5)
EST. GFR  (AFRICAN AMERICAN): 50.4 ML/MIN/1.73 M^2
EST. GFR  (NON AFRICAN AMERICAN): 43.7 ML/MIN/1.73 M^2
GLUCOSE SERPL-MCNC: 104 MG/DL (ref 70–110)
HCT VFR BLD AUTO: 40.6 % (ref 37–48.5)
HGB BLD-MCNC: 13.2 G/DL (ref 12–16)
IMM GRANULOCYTES # BLD AUTO: 0.07 K/UL (ref 0–0.04)
IMM GRANULOCYTES NFR BLD AUTO: 0.9 % (ref 0–0.5)
LYMPHOCYTES # BLD AUTO: 1.4 K/UL (ref 1–4.8)
LYMPHOCYTES NFR BLD: 17.4 % (ref 18–48)
MCH RBC QN AUTO: 28 PG (ref 27–31)
MCHC RBC AUTO-ENTMCNC: 32.5 G/DL (ref 32–36)
MCV RBC AUTO: 86 FL (ref 82–98)
MONOCYTES # BLD AUTO: 0.6 K/UL (ref 0.3–1)
MONOCYTES NFR BLD: 7.6 % (ref 4–15)
NEUTROPHILS # BLD AUTO: 5.9 K/UL (ref 1.8–7.7)
NEUTROPHILS NFR BLD: 73 % (ref 38–73)
NRBC BLD-RTO: 0 /100 WBC
PLATELET # BLD AUTO: 421 K/UL (ref 150–350)
PMV BLD AUTO: 10 FL (ref 9.2–12.9)
POTASSIUM SERPL-SCNC: 2.8 MMOL/L (ref 3.5–5.1)
PROT SERPL-MCNC: 6.6 G/DL (ref 6–8.4)
RBC # BLD AUTO: 4.72 M/UL (ref 4–5.4)
SODIUM SERPL-SCNC: 132 MMOL/L (ref 136–145)
WBC # BLD AUTO: 8.04 K/UL (ref 3.9–12.7)

## 2019-05-29 PROCEDURE — 99214 PR OFFICE/OUTPT VISIT, EST, LEVL IV, 30-39 MIN: ICD-10-PCS | Mod: S$PBB,,, | Performed by: NEUROLOGICAL SURGERY

## 2019-05-29 PROCEDURE — 99999 PR PBB SHADOW E&M-EST. PATIENT-LVL V: ICD-10-PCS | Mod: PBBFAC,GC,, | Performed by: STUDENT IN AN ORGANIZED HEALTH CARE EDUCATION/TRAINING PROGRAM

## 2019-05-29 PROCEDURE — 80053 COMPREHEN METABOLIC PANEL: CPT

## 2019-05-29 PROCEDURE — 99999 PR PBB SHADOW E&M-EST. PATIENT-LVL III: CPT | Mod: PBBFAC,,, | Performed by: NEUROLOGICAL SURGERY

## 2019-05-29 PROCEDURE — 99999 PR PBB SHADOW E&M-EST. PATIENT-LVL V: CPT | Mod: PBBFAC,GC,, | Performed by: STUDENT IN AN ORGANIZED HEALTH CARE EDUCATION/TRAINING PROGRAM

## 2019-05-29 PROCEDURE — 99205 OFFICE O/P NEW HI 60 MIN: CPT | Mod: S$PBB,,, | Performed by: SURGERY

## 2019-05-29 PROCEDURE — 85025 COMPLETE CBC W/AUTO DIFF WBC: CPT

## 2019-05-29 PROCEDURE — 99215 OFFICE O/P EST HI 40 MIN: CPT | Mod: S$PBB,GC,, | Performed by: STUDENT IN AN ORGANIZED HEALTH CARE EDUCATION/TRAINING PROGRAM

## 2019-05-29 PROCEDURE — 99215 OFFICE O/P EST HI 40 MIN: CPT | Mod: PBBFAC,27 | Performed by: STUDENT IN AN ORGANIZED HEALTH CARE EDUCATION/TRAINING PROGRAM

## 2019-05-29 PROCEDURE — 99214 OFFICE O/P EST MOD 30 MIN: CPT | Mod: PBBFAC,27 | Performed by: SURGERY

## 2019-05-29 PROCEDURE — 99999 PR PBB SHADOW E&M-EST. PATIENT-LVL III: ICD-10-PCS | Mod: PBBFAC,,, | Performed by: NEUROLOGICAL SURGERY

## 2019-05-29 PROCEDURE — 99999 PR PBB SHADOW E&M-EST. PATIENT-LVL IV: CPT | Mod: PBBFAC,,, | Performed by: SURGERY

## 2019-05-29 PROCEDURE — 99999 PR PBB SHADOW E&M-EST. PATIENT-LVL IV: ICD-10-PCS | Mod: PBBFAC,,, | Performed by: SURGERY

## 2019-05-29 PROCEDURE — 36415 COLL VENOUS BLD VENIPUNCTURE: CPT

## 2019-05-29 PROCEDURE — 99205 PR OFFICE/OUTPT VISIT, NEW, LEVL V, 60-74 MIN: ICD-10-PCS | Mod: S$PBB,,, | Performed by: SURGERY

## 2019-05-29 PROCEDURE — 99213 OFFICE O/P EST LOW 20 MIN: CPT | Mod: PBBFAC,27 | Performed by: NEUROLOGICAL SURGERY

## 2019-05-29 PROCEDURE — 99214 OFFICE O/P EST MOD 30 MIN: CPT | Mod: S$PBB,,, | Performed by: NEUROLOGICAL SURGERY

## 2019-05-29 PROCEDURE — 99215 PR OFFICE/OUTPT VISIT, EST, LEVL V, 40-54 MIN: ICD-10-PCS | Mod: S$PBB,GC,, | Performed by: STUDENT IN AN ORGANIZED HEALTH CARE EDUCATION/TRAINING PROGRAM

## 2019-05-29 RX ORDER — ONDANSETRON 8 MG/1
8 TABLET, ORALLY DISINTEGRATING ORAL EVERY 8 HOURS PRN
Qty: 30 TABLET | Refills: 3 | Status: SHIPPED | OUTPATIENT
Start: 2019-05-29 | End: 2019-08-21 | Stop reason: SDUPTHER

## 2019-05-29 RX ORDER — LIDOCAINE AND PRILOCAINE 25; 25 MG/G; MG/G
CREAM TOPICAL
Qty: 30 G | Refills: 3 | Status: ON HOLD | OUTPATIENT
Start: 2019-05-29 | End: 2019-11-04 | Stop reason: HOSPADM

## 2019-05-29 RX ORDER — HEPARIN 100 UNIT/ML
500 SYRINGE INTRAVENOUS
Status: CANCELLED | OUTPATIENT
Start: 2019-05-30

## 2019-05-29 RX ORDER — LORAZEPAM/0.9% SODIUM CHLORIDE 100MG/0.1L
2 PLASTIC BAG, INJECTION (ML) INTRAVENOUS
Status: CANCELLED | OUTPATIENT
Start: 2019-05-30

## 2019-05-29 RX ORDER — SODIUM CHLORIDE 0.9 % (FLUSH) 0.9 %
10 SYRINGE (ML) INJECTION
Status: CANCELLED | OUTPATIENT
Start: 2019-05-30

## 2019-05-29 RX ORDER — POTASSIUM CHLORIDE 20 MEQ/1
20 TABLET, EXTENDED RELEASE ORAL 2 TIMES DAILY
Qty: 6 TABLET | Refills: 0 | Status: ON HOLD | OUTPATIENT
Start: 2019-05-29 | End: 2019-06-04

## 2019-05-29 NOTE — PATIENT INSTRUCTIONS
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    Given the nature of this type of cancer, which is known to rapidly metastasize to the brain, we will proceed with systemic chemotherapy per Hem/Onc and follow up with Dr. Shimon Quintana in three weeks, at which point we will proceed with DM-JEFFERY-PEN plus whole brain radiation at pt's discretion.     I will schedule the patient for 2-3 month follow up with MRI brain.

## 2019-05-29 NOTE — Clinical Note
1. gen surg consult for today 2. Labs after her appointment with general surgery cmp, cbc  3. Follow up with me 6/5/19 with cmp, cbc thanks -e

## 2019-05-29 NOTE — LETTER
June 1, 2019      Magali Zamorano MD  [address] [facility] [address]           [facility] - General Surgery  [address]  [place] [address]  Phone: [phone]          Patient: Rosita Almodovar   MR Number: [id number]   YOB: 1940   Date of Visit: 5/29/2019       Dear Dr. Magali Zamorano:    Thank you for referring Rosita Almodovar to me for evaluation. Attached you will find relevant portions of my assessment and plan of care.    If you have questions, please do not hesitate to call me. I look forward to following Rosita Almodovar along with you.    Sincerely,    Roma Leach MD    Enclosure  CC:  No Recipients    If you would like to receive this communication electronically, please contact [email or web address] or [phone] to request more information on [employer] Link access.    For providers and/or their staff who would like to refer a patient to [facility], please contact us through our one-stop-shop provider referral line, [facility] , at [phone].    If you feel you have received this communication in error or would no longer like to receive these types of communications, please e-mail [email or web address]

## 2019-05-29 NOTE — PROGRESS NOTES
Subjective:   I, Tammy Eng, attest that this documentation has been prepared under the direction and in the presence of Paul Corbett MD.     Patient ID: Rosita Almodovar is a 79 y.o. female     Chief Complaint: Post-op Evaluation      HPI  Ms. Rosita Almodovar is a pleasant 79 y.o. woman with a lung mass who is s/p lung biopsy on 05/22/2019 and presents today for follow up to discuss treatment. Pt recently presented to the [facility] ED with a chief complaint of dysarthria, 1 week after a mechanical fall. She states symptoms manifested while talking to her son over the phone. She was worked up with a MRI brain to r/o CVA and was found to have multiple brain lesions concerning for metastasis. She was also worked up with a CT chest/abd/pelvis which showed a lung mass. Pt was subsequently see in clinic on 05/24/2019; her MRI showed at least 5 diffuse brain tumors.    Pt states she has been feeling very fatigued and has no additional complaints at this time.      Review of Systems   Constitutional: Positive for fatigue. Negative for activity change, fever and unexpected weight change.   HENT: Negative for facial swelling.    Eyes: Negative.    Respiratory: Negative.    Cardiovascular: Negative.    Gastrointestinal: Negative for diarrhea, nausea and vomiting.   Genitourinary: Negative.    Musculoskeletal: Negative for back pain, joint swelling, myalgias and neck pain.   Neurological: Negative for dizziness, weakness, numbness and headaches.   Psychiatric/Behavioral: Negative.       Past Medical History:   Diagnosis Date    Acquired hypothyroidism 5/8/2019    Brain tumor     COPD (chronic obstructive pulmonary disease) 5/8/2019    Gastric ulcer     Hilar mass     Kidney cysts     left    Pleural effusion        Objective:      Vitals:    05/29/19 1020   BP: (!) 98/57   Pulse: 75   Temp: 97.5 °F (36.4 °C)      Physical Exam   Constitutional: She is oriented to person, place, and time. She  appears well-developed and well-nourished.   HENT:   Head: Normocephalic and atraumatic.   Neck: Neck supple.   Neurological: She is alert and oriented to person, place, and time. No cranial nerve deficit. She displays a negative Romberg sign. GCS eye subscore is 4. GCS verbal subscore is 5. GCS motor subscore is 6.      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    I have personally reviewed the images with the pt.      I, Dr. Paul Corbett, personally performed the services described in this documentation. All medical record entries made by the scribe, Tammy Eng, were at my direction and in my presence.  I have reviewed the chart and agree that the record reflects my personal performance and is accurate and complete. Paul Corbett MD. 05/29/2019    Assessment:       1. Lung cancer.  2. Brain metastases.     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    Given the nature of this type of cancer, which is known to rapidly metastasize to the brain, we will proceed with systemic chemotherapy per Hem/Onc and follow up with Dr. Shimon Quintana in three weeks, at which point we will proceed with DM-JEFFERY-PEN plus whole brain radiation at pt's discretion.     I will schedule the patient for 2-3 month follow up with MRI brain.

## 2019-05-29 NOTE — PROGRESS NOTES
General Surgery Office Visit   History and Physical    Patient Name: Rosita Almodovar  YOB: 1940 (79 y.o.)  MRN: [id number]  Today's Date: 2019    Referring Md:   Magali Zamorano Md  [address] [place] [address]    SUBJECTIVE:     Chief Complaint: Need for port placement    History of Present Illness:  Patient is a 79 y.o. 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 Patient was diagnosed approximately 3 weeks ago after undergoing workup for a fall. She was found to have cerebral and cerebellar ring enhancing lesions, likely metastatic disease. CT chest showed a left sided locally advanced hilar mass. IR biopsy on  showed it to be small cell lung cancer with features of large cell neruoendocrine cells. Plan is for her to start chemo on 19, and so she is here for evaluation of port placement.     Patient is right handed, left anterior chest wall is very tender, potentially from locally advanced disease.    Was on plavix, but stopped on .         Review of patient's allergies indicates:   Allergen Reactions    Potassium Nausea Only       Past Medical History:   Diagnosis Date    Acquired hypothyroidism 2019    Brain tumor     COPD (chronic obstructive pulmonary disease) 2019    Gastric ulcer     Hilar mass     Kidney cysts     left    Pleural effusion      Past Surgical History:   Procedure Laterality Date    ANGIOGRAM, CORONARY, WITH LEFT HEART CATHETERIZATION      endoscope      kidney cyst excision and drainage       No family history on file.  Social History     Tobacco Use    Smoking status: Former Smoker     Packs/day: 1.00     Years: 54.00     Pack years: 54.00     Types: Cigarettes     Last attempt to quit: 2014     Years since quittin.4    Smokeless tobacco: Never Used   Substance Use Topics    Alcohol use: No     Frequency: Never    Drug use: No        Review of Systems:  Review of  "Systems   Constitutional: Negative for chills and fever.   Respiratory: Positive for shortness of breath.    Cardiovascular: Negative for chest pain.   Gastrointestinal: Negative for abdominal pain, nausea and vomiting.   Neurological: Positive for weakness and headaches.       OBJECTIVE:     Vital Signs (Most Recent)  /62   Pulse 75   Temp 98.3 °F (36.8 °C)   Ht 5' 1" (1.549 m)   Wt 54.9 kg (121 lb)   BMI 22.86 kg/m²     Physical Exam:  General: White female in no distress   Neuro: alert and oriented x 4.  Moves all extremities.     HEENT: no icterus.  Trachea midline  Respiratory: respirations are even and unlabored. Tender to palpation on left anterior chest.   Cardiac: regular rate  Abdomen: soft nontender  Extremities: Warm dry and intact  Skin: no rashes    Labs: CBC WNL. Low lytes (K, Cl, Na)    Imaging: CT chest and CXR post-biopsy reviewed      ASSESSMENT/PLAN:     Patient is a 80yo female with metastatic SCC of the left lung, locally advanced.    - Will plan for right sided port placement  - Plavix discontinued by PCP, instructed patient to be sure to stop taking it.       Светлана Davis MD  General Surgery Resident, PGY 1  Pager [phone]  "

## 2019-05-30 ENCOUNTER — INFUSION (OUTPATIENT)
Dept: INFUSION THERAPY | Facility: HOSPITAL | Age: 79
End: 2019-05-30
Attending: INTERNAL MEDICINE
Payer: MEDICARE

## 2019-05-30 VITALS
WEIGHT: 121 LBS | TEMPERATURE: 98 F | DIASTOLIC BLOOD PRESSURE: 53 MMHG | HEART RATE: 80 BPM | BODY MASS INDEX: 22.84 KG/M2 | RESPIRATION RATE: 18 BRPM | SYSTOLIC BLOOD PRESSURE: 98 MMHG | HEIGHT: 61 IN

## 2019-05-30 DIAGNOSIS — E86.0 DEHYDRATION: Primary | ICD-10-CM

## 2019-05-30 PROCEDURE — 25000003 PHARM REV CODE 250: Performed by: STUDENT IN AN ORGANIZED HEALTH CARE EDUCATION/TRAINING PROGRAM

## 2019-05-30 PROCEDURE — 96366 THER/PROPH/DIAG IV INF ADDON: CPT

## 2019-05-30 PROCEDURE — 63600175 PHARM REV CODE 636 W HCPCS: Performed by: STUDENT IN AN ORGANIZED HEALTH CARE EDUCATION/TRAINING PROGRAM

## 2019-05-30 PROCEDURE — 96365 THER/PROPH/DIAG IV INF INIT: CPT

## 2019-05-30 RX ORDER — LORAZEPAM/0.9% SODIUM CHLORIDE 100MG/0.1L
2 PLASTIC BAG, INJECTION (ML) INTRAVENOUS
Status: COMPLETED | OUTPATIENT
Start: 2019-05-30 | End: 2019-05-30

## 2019-05-30 RX ORDER — SODIUM CHLORIDE 0.9 % (FLUSH) 0.9 %
10 SYRINGE (ML) INJECTION
Status: DISCONTINUED | OUTPATIENT
Start: 2019-05-30 | End: 2019-05-30 | Stop reason: HOSPADM

## 2019-05-30 RX ORDER — HEPARIN 100 UNIT/ML
500 SYRINGE INTRAVENOUS
Status: DISCONTINUED | OUTPATIENT
Start: 2019-05-30 | End: 2019-05-30 | Stop reason: HOSPADM

## 2019-05-30 RX ADMIN — POTASSIUM CHLORIDE: 149 INJECTION, SOLUTION, CONCENTRATE INTRAVENOUS at 10:05

## 2019-05-30 RX ADMIN — MAGNESIUM SULFATE IN WATER 2 G: 40 INJECTION, SOLUTION INTRAVENOUS at 10:05

## 2019-05-30 NOTE — PLAN OF CARE
Problem: Adult Inpatient Plan of Care  Goal: Plan of Care Review  Outcome: Ongoing (interventions implemented as appropriate)  Pt here for IVF with 40 meq KCL and 2 gm MG, pt has not started chemo yet, pt has had decreased appetite and weight loss, dietician consult placed, pt has no complaints of pain at this time but is weak, assisted by spouse from wheelchair to recliner, continue to monitor

## 2019-05-30 NOTE — PLAN OF CARE
Problem: Adult Inpatient Plan of Care  Goal: Plan of Care Review  Outcome: Ongoing (interventions implemented as appropriate)  Pt tolerated ivf with KCL and MG without issue , pt has no upcoming appts scheduled at this time, no distres snoted upon d/c to home

## 2019-06-03 ENCOUNTER — TELEPHONE (OUTPATIENT)
Dept: SURGERY | Facility: CLINIC | Age: 79
End: 2019-06-03

## 2019-06-03 ENCOUNTER — ANESTHESIA EVENT (OUTPATIENT)
Dept: SURGERY | Facility: HOSPITAL | Age: 79
End: 2019-06-03
Payer: MEDICARE

## 2019-06-03 NOTE — ANESTHESIA PREPROCEDURE EVALUATION
06/03/2019  Rosita Almodovar is a 79 y.o., female.    Pre-operative evaluation for Procedure(s) (LRB):  [id number]-CATH RIGHT/LEFT NECK OR CHEST (N/A)    Rosita Almodovar is a 79 y.o. female w/ h/o COPD (on occasional home O2 QHS), HTN, h/o CVA and newly diagnosed metastatic small cell carcinoma of the L lung w/ mets to the brain who is presenting for port-a-cath placement to start chemotherapy.     Per , when pt lays flat, she goes apneic.     Prev airway: none in system    Patient Active Problem List   Diagnosis    YAKOV (acute kidney injury)    CVA (cerebral vascular accident)    Dehydration    TIA (transient ischemic attack)    Anorexia    Fall at home    Acquired hypothyroidism    Anxiety    COPD (chronic obstructive pulmonary disease)    Cerebellar mass    Ataxia    Dysarthria    Essential hypertension    GERD (gastroesophageal reflux disease)    Abnormal CT scan, lung    Small cell lung cancer, left    Dehydration       Review of patient's allergies indicates:   Allergen Reactions    Potassium Nausea Only        No current facility-administered medications on file prior to encounter.      Current Outpatient Medications on File Prior to Encounter   Medication Sig Dispense Refill    albuterol (VENTOLIN HFA) 90 mcg/actuation inhaler Ventolin HFA 90 mcg/actuation aerosol inhaler      atenolol (TENORMIN) 50 MG tablet Take 50 mg by mouth once daily.  1    budesonide-formoterol 160-4.5 mcg (SYMBICORT) 160-4.5 mcg/actuation HFAA Inhale 2 puffs into the lungs every 12 (twelve) hours. Controller 1 Inhaler 11    clonazePAM (KLONOPIN) 0.5 MG tablet TAKE ONE TABLET BY MOUTH THREE TIMES DAILY AS NEEDED FOR ANXIETY OR for panic attacks  3    furosemide (LASIX) 20 MG tablet Take 20 mg by mouth once daily.       levothyroxine (SYNTHROID) 75 MCG tablet Take 75 mcg by mouth  before breakfast.       lisinopril (PRINIVIL,ZESTRIL) 5 MG tablet Take 5 mg by mouth once daily.       pantoprazole (PROTONIX) 40 MG tablet TAKE ONE TABLET BY MOUTH EVERY DAY FOR stomach  3    pravastatin (PRAVACHOL) 20 MG tablet Take 20 mg by mouth once daily.       sotalol (BETAPACE) 80 MG tablet TAKE ONE TABLET BY MOUTH TWICE DAILY FOR BLOOD PRESSURE AND HEART  1    traMADol (ULTRAM) 50 mg tablet Take 50 mg by mouth every 12 (twelve) hours as needed.   3    clopidogrel (PLAVIX) 75 mg tablet       DECARA 50,000 unit capsule Take 50,000 Units by mouth every 7 days.  3    ergocalciferol (VITAMIN D2) 50,000 unit Cap Take 50,000 Units by mouth every 7 days. Tuesday      levothyroxine (SYNTHROID) 50 MCG tablet Take 50 mcg by mouth every morning.  3    lidocaine-prilocaine (EMLA) cream Apply to port site 1 hour prior to chemotherapy and cover with saran wrap 30 g 3    ondansetron (ZOFRAN-ODT) 8 MG TbDL Dissolve 1 tablet (8 mg total) under tongue every 8 (eight) hours as needed (nausea). 30 tablet 3    tiotropium bromide 2.5 mcg/actuation Mist Inhale 5 mcg into the lungs once daily. Controller 4 g 5       Past Surgical History:   Procedure Laterality Date    ANGIOGRAM, CORONARY, WITH LEFT HEART CATHETERIZATION      endoscope      kidney cyst excision and drainage         Social History     Socioeconomic History    Marital status:      Spouse name: Not on file    Number of children: Not on file    Years of education: Not on file    Highest education level: Not on file   Occupational History    Not on file   Social Needs    Financial resource strain: Not on file    Food insecurity:     Worry: Not on file     Inability: Not on file    Transportation needs:     Medical: Not on file     Non-medical: Not on file   Tobacco Use    Smoking status: Former Smoker     Packs/day: 1.00     Years: 54.00     Pack years: 54.00     Types: Cigarettes     Last attempt to quit: 2014     Years since quitting:  5.4    Smokeless tobacco: Never Used   Substance and Sexual Activity    Alcohol use: No     Frequency: Never    Drug use: No    Sexual activity: Yes     Partners: Male   Lifestyle    Physical activity:     Days per week: Not on file     Minutes per session: Not on file    Stress: Not on file   Relationships    Social connections:     Talks on phone: Not on file     Gets together: Not on file     Attends [employer] service: Not on file     Active member of club or organization: Not on file     Attends meetings of clubs or organizations: Not on file     Relationship status: Not on file   Other Topics Concern    Not on file   Social History Narrative    Not on file         Vital Signs Range (Last 24H):         CBC: No results for input(s): WBC, RBC, HGB, HCT, PLT, MCV, MCH, MCHC in the last 72 hours.    CMP: No results for input(s): NA, K, CL, CO2, BUN, CREATININE, GLU, MG, PHOS, CALCIUM, ALBUMIN, PROT, ALKPHOS, ALT, AST, BILITOT in the last 72 hours.    INR  No results for input(s): PT, INR, PROTIME, APTT in the last 72 hours.        Diagnostic Studies:      EKG:  Vent. Rate : 072 BPM     Atrial Rate : 072 BPM     P-R Int : 152 ms          QRS Dur : 122 ms      QT Int : 440 ms       P-R-T Axes : 098 -32 153 degrees     QTc Int : 481 ms    Normal sinus rhythm  Left axis deviation  Nonspecific intraventricular conduction delay  ST and T wave abnormality, consider lateral ischemia  Abnormal ECG  When compared with ECG of 08-MAY-2019 17:33,  Criteria for Inferior infarct are no longer Present  T wave inversion less evident in Anterior-lateral leads  Confirmed by Tucker MCCLELLAN, Jonah ([id number]) on 5/10/2019 11:46:33 AM    2D Echo:  · Concentric left ventricular remodeling.  · Normal left ventricular systolic function. The estimated ejection fraction is 60-65%  · Grade I (mild) left ventricular diastolic dysfunction consistent with impaired relaxation.  · Moderate aortic regurgitation.  · There is a left pleural  effusion.        Anesthesia Evaluation    I have reviewed the Patient Summary Reports.    I have reviewed the Nursing Notes.   I have reviewed the Medications.     Review of Systems  Anesthesia Hx:  History of prior surgery of interest to airway management or planning:  Denies Personal Hx of Anesthesia complications.   Social:  Former Smoker    Hematology/Oncology:        Current/Recent Cancer. Other (see Oncology comments)   EENT/Dental:EENT/Dental Normal   Cardiovascular:   Hypertension, well controlled CHF ECG has been reviewed.    Pulmonary:   COPD, moderate    Hepatic/GI:   PUD, GERD, well controlled    Neurological:   TIA, CVA    Endocrine:   Hypothyroidism    Psych:  Psychiatric Normal           Physical Exam  General:  Malnutrition, Cachexia    Airway/Jaw/Neck:  Airway Findings: Mouth Opening: Small, but > 3cm Tongue: Normal  General Airway Assessment: Adult, Possible difficult intubation  Mallampati: III  Improves to II with phonation.  TM Distance: Normal, at least 6 cm  Jaw/Neck Findings:  Neck ROM: Extension Decreased, Mild      Dental:  Dental Findings: Upper Dentures, Lower partial dentures    Chest/Lungs:  Chest/Lungs Findings: Clear to auscultation, Normal Respiratory Rate, Decreased Breath Sounds Bilateral     Heart/Vascular:  Heart Findings: Rate: Normal  Rhythm: Regular Rhythm  Sounds: Normal  Heart murmur: negative Vascular Findings: Normal    Abdomen:  Abdomen Findings: Normal    Musculoskeletal:  Musculoskeletal Findings: Normal   Skin:  Skin Findings: Normal    Mental Status:  Mental Status Findings: Normal        Anesthesia Plan  Type of Anesthesia, risks & benefits discussed:  Anesthesia Type:  MAC, general  Patient's Preference:   Intra-op Monitoring Plan: standard ASA monitors  Intra-op Monitoring Plan Comments:   Post Op Pain Control Plan: multimodal analgesia  Post Op Pain Control Plan Comments:   Induction:   IV  Beta Blocker:  Patient is not currently on a Beta-Blocker (No further  documentation required).       Informed Consent: Patient understands risks and agrees with Anesthesia plan.  Questions answered. Anesthesia consent signed with patient.  ASA Score: 3     Day of Surgery Review of History & Physical:    H&P update referred to the surgeon.         Ready For Surgery From Anesthesia Perspective.

## 2019-06-04 ENCOUNTER — ANESTHESIA (OUTPATIENT)
Dept: SURGERY | Facility: HOSPITAL | Age: 79
End: 2019-06-04
Payer: MEDICARE

## 2019-06-04 ENCOUNTER — TELEPHONE (OUTPATIENT)
Dept: HEMATOLOGY/ONCOLOGY | Facility: CLINIC | Age: 79
End: 2019-06-04

## 2019-06-04 ENCOUNTER — HOSPITAL ENCOUNTER (OUTPATIENT)
Facility: HOSPITAL | Age: 79
Discharge: HOME | End: 2019-06-04
Attending: SURGERY | Admitting: SURGERY
Payer: MEDICARE

## 2019-06-04 VITALS
HEIGHT: 62 IN | RESPIRATION RATE: 22 BRPM | WEIGHT: 121 LBS | SYSTOLIC BLOOD PRESSURE: 103 MMHG | BODY MASS INDEX: 22.26 KG/M2 | OXYGEN SATURATION: 100 % | TEMPERATURE: 97 F | HEART RATE: 69 BPM | DIASTOLIC BLOOD PRESSURE: 60 MMHG

## 2019-06-04 DIAGNOSIS — C34.90 LUNG CANCER: Primary | ICD-10-CM

## 2019-06-04 PROCEDURE — 71000015 HC POSTOP RECOV 1ST HR: Performed by: SURGERY

## 2019-06-04 PROCEDURE — 63600175 PHARM REV CODE 636 W HCPCS: Performed by: SURGERY

## 2019-06-04 PROCEDURE — 63600175 PHARM REV CODE 636 W HCPCS: Performed by: STUDENT IN AN ORGANIZED HEALTH CARE EDUCATION/TRAINING PROGRAM

## 2019-06-04 PROCEDURE — 77001 FLUOROGUIDE FOR VEIN DEVICE: CPT | Mod: 26,,, | Performed by: SURGERY

## 2019-06-04 PROCEDURE — 37000009 HC ANESTHESIA EA ADD 15 MINS: Performed by: SURGERY

## 2019-06-04 PROCEDURE — 36000706: Performed by: SURGERY

## 2019-06-04 PROCEDURE — 25000003 PHARM REV CODE 250: Performed by: STUDENT IN AN ORGANIZED HEALTH CARE EDUCATION/TRAINING PROGRAM

## 2019-06-04 PROCEDURE — 36561 INSERT TUNNELED CV CATH: CPT | Mod: LT,,, | Performed by: SURGERY

## 2019-06-04 PROCEDURE — 36561 PR INSERT TUNNELED CV CATH WITH PORT: ICD-10-PCS | Mod: LT,,, | Performed by: SURGERY

## 2019-06-04 PROCEDURE — 25000003 PHARM REV CODE 250: Performed by: SURGERY

## 2019-06-04 PROCEDURE — D9220A PRA ANESTHESIA: ICD-10-PCS | Mod: ,,, | Performed by: ANESTHESIOLOGY

## 2019-06-04 PROCEDURE — C1788 PORT, INDWELLING, IMP: HCPCS | Performed by: SURGERY

## 2019-06-04 PROCEDURE — 36000707: Performed by: SURGERY

## 2019-06-04 PROCEDURE — D9220A PRA ANESTHESIA: Mod: ,,, | Performed by: ANESTHESIOLOGY

## 2019-06-04 PROCEDURE — 37000008 HC ANESTHESIA 1ST 15 MINUTES: Performed by: SURGERY

## 2019-06-04 PROCEDURE — 77001 CHG FLUOROGUIDE CNTRL VEN ACCESS,PLACE,REPLACE,REMOVE: ICD-10-PCS | Mod: 26,,, | Performed by: SURGERY

## 2019-06-04 DEVICE — KIT POWERPORT SINGLE 8FR: Type: IMPLANTABLE DEVICE | Site: CHEST | Status: FUNCTIONAL

## 2019-06-04 RX ORDER — BUPIVACAINE HYDROCHLORIDE 2.5 MG/ML
INJECTION, SOLUTION EPIDURAL; INFILTRATION; INTRACAUDAL
Status: DISCONTINUED | OUTPATIENT
Start: 2019-06-04 | End: 2019-06-04 | Stop reason: HOSPADM

## 2019-06-04 RX ORDER — ONDANSETRON 2 MG/ML
INJECTION INTRAMUSCULAR; INTRAVENOUS
Status: DISCONTINUED | OUTPATIENT
Start: 2019-06-04 | End: 2019-06-04

## 2019-06-04 RX ORDER — CEFAZOLIN SODIUM 1 G/3ML
2 INJECTION, POWDER, FOR SOLUTION INTRAMUSCULAR; INTRAVENOUS
Status: COMPLETED | OUTPATIENT
Start: 2019-06-04 | End: 2019-06-04

## 2019-06-04 RX ORDER — LIDOCAINE HYDROCHLORIDE AND EPINEPHRINE 10; 10 MG/ML; UG/ML
INJECTION, SOLUTION INFILTRATION; PERINEURAL
Status: DISCONTINUED | OUTPATIENT
Start: 2019-06-04 | End: 2019-06-04 | Stop reason: HOSPADM

## 2019-06-04 RX ORDER — SODIUM CHLORIDE 9 MG/ML
INJECTION, SOLUTION INTRAVENOUS CONTINUOUS PRN
Status: DISCONTINUED | OUTPATIENT
Start: 2019-06-04 | End: 2019-06-04

## 2019-06-04 RX ORDER — PHENYLEPHRINE HYDROCHLORIDE 10 MG/ML
INJECTION INTRAVENOUS
Status: DISCONTINUED | OUTPATIENT
Start: 2019-06-04 | End: 2019-06-04

## 2019-06-04 RX ORDER — LIDOCAINE HCL/PF 100 MG/5ML
SYRINGE (ML) INTRAVENOUS
Status: DISCONTINUED | OUTPATIENT
Start: 2019-06-04 | End: 2019-06-04

## 2019-06-04 RX ORDER — PROPOFOL 10 MG/ML
VIAL (ML) INTRAVENOUS CONTINUOUS PRN
Status: DISCONTINUED | OUTPATIENT
Start: 2019-06-04 | End: 2019-06-04

## 2019-06-04 RX ORDER — ACETAMINOPHEN 10 MG/ML
1000 INJECTION, SOLUTION INTRAVENOUS ONCE
Status: DISCONTINUED | OUTPATIENT
Start: 2019-06-04 | End: 2019-06-04 | Stop reason: HOSPADM

## 2019-06-04 RX ORDER — PROPOFOL 10 MG/ML
VIAL (ML) INTRAVENOUS
Status: DISCONTINUED | OUTPATIENT
Start: 2019-06-04 | End: 2019-06-04

## 2019-06-04 RX ORDER — HEPARIN SODIUM (PORCINE) LOCK FLUSH IV SOLN 100 UNIT/ML 100 UNIT/ML
SOLUTION INTRAVENOUS
Status: DISCONTINUED | OUTPATIENT
Start: 2019-06-04 | End: 2019-06-04 | Stop reason: HOSPADM

## 2019-06-04 RX ORDER — LIDOCAINE HYDROCHLORIDE 10 MG/ML
1 INJECTION, SOLUTION EPIDURAL; INFILTRATION; INTRACAUDAL; PERINEURAL ONCE
Status: DISCONTINUED | OUTPATIENT
Start: 2019-06-04 | End: 2019-06-04

## 2019-06-04 RX ORDER — SODIUM CHLORIDE 0.9 % (FLUSH) 0.9 %
10 SYRINGE (ML) INJECTION
Status: DISCONTINUED | OUTPATIENT
Start: 2019-06-04 | End: 2019-06-04 | Stop reason: HOSPADM

## 2019-06-04 RX ADMIN — PHENYLEPHRINE HYDROCHLORIDE 100 MCG: 10 INJECTION INTRAVENOUS at 07:06

## 2019-06-04 RX ADMIN — ONDANSETRON 4 MG: 2 INJECTION INTRAMUSCULAR; INTRAVENOUS at 07:06

## 2019-06-04 RX ADMIN — PROPOFOL 20 MG: 10 INJECTION, EMULSION INTRAVENOUS at 07:06

## 2019-06-04 RX ADMIN — SODIUM CHLORIDE: 0.9 INJECTION, SOLUTION INTRAVENOUS at 07:06

## 2019-06-04 RX ADMIN — PROPOFOL 200 MCG/KG/MIN: 10 INJECTION, EMULSION INTRAVENOUS at 07:06

## 2019-06-04 RX ADMIN — CEFAZOLIN 2 G: 330 INJECTION, POWDER, FOR SOLUTION INTRAMUSCULAR; INTRAVENOUS at 07:06

## 2019-06-04 RX ADMIN — LIDOCAINE HYDROCHLORIDE 60 MG: 20 INJECTION, SOLUTION INTRAVENOUS at 07:06

## 2019-06-04 NOTE — PLAN OF CARE
Pt is AAOx4. VSS. NAD. IV discontinued. Discharge instructions given, verbalized understanding. Denies pain or nausea. Discharged home with family.

## 2019-06-04 NOTE — TRANSFER OF CARE
"Anesthesia Transfer of Care Note    Patient: Rosita Almodovar    Procedure(s) Performed: Procedure(s) (LRB):  [id number]-CATH LEFT NECK (Left)    Patient location: [facility]    Anesthesia Type: MAC    Transport from OR: Transported from OR on 2-3 L/min O2 by NC with adequate spontaneous ventilation    Post pain: adequate analgesia    Post assessment: no apparent anesthetic complications and tolerated procedure well    Post vital signs: stable    Level of consciousness: awake, alert and oriented    Nausea/Vomiting: no nausea/vomiting    Complications: none    Transfer of care protocol was followed      Last vitals:   Visit Vitals  BP (!) 103/58 (BP Location: Right arm, Patient Position: Lying)   Pulse 76   Temp 36.7 °C (98.1 °F) (Oral)   Resp 20   Ht 5' 1.5" (1.562 m)   Wt 54.9 kg (121 lb)   SpO2 (!) 94%   Breastfeeding? No   BMI 22.49 kg/m²     "

## 2019-06-04 NOTE — TELEPHONE ENCOUNTER
Called patient to let her know we received the approval for the chemo. Scheduled the chemo after follow up on Wednesday 6/5.6/6 & 6/7.        ----- Message from Jewels Holloway sent at 5/28/2019  7:35 AM CDT -----  Regarding: pending chemo 5/26  MD Jewels Mireles         [facility]   This patient needs chemo auth for carbo/etopo/atezo     Thanks   -e

## 2019-06-04 NOTE — OP NOTE
DATE: 6/4/19    PREOPERATIVE DIAGNOSIS: Metastatic left lung cancer    POSTOPERATIVE DIAGNOSIS: Metastatic left lung cancer    PROCEDURE PERFORMED: left subclavian port-a-cath placement.     ATTENDING SURGEON: Reggie Franklin M.D.     HOUSESTAFF SURGEON: Светлана Davis M.D. (RES)     ANESTHESIA: Monitored anesthesia care plus local.     ESTIMATED BLOOD LOSS: 2 mL     FINDINGS: A left subclavian  port placed with tip at junction of superior vena cava and right atrium.     SPECIMEN: None.     DRAINS: None.     COMPLICATIONS: None.     INDICATIONS: Rosita Almodovar is a 79 y.o. female recently diagnosed with left sided small cell lung cancer with metastases to the brain. General Surgery was consulted for port placement for chemotherapy administration. We recommended a port and the patient agreed to proceed. The patient did sign informed consent and expressed understanding of the risks and benefits of surgery.     OPERATIVE PROCEDURE: The patient was identified in preoperative Holding, brought back to the operating room, and placed supine on the operating table and padded appropriately. A shoulder roll was placed underneath the patient's shoulders. Monitors were applied. Monitored anesthesia care was initiated. The left neck was prepped and draped in the standard sterile surgical fashion. A timeout was performed and all team members present agreed this was the correct procedure on the correct patient. We also confirmed administration of appropriate preoperative antibiotics.     After administration of appropriate local anesthesia, the patient was placed in Trendelenburg and the left subclavian artery was accessed on the 4th pass with a hollow-bore needle. A guidewire was placed and confirmed to be in correct position by fluoroscopy. An appropriate area for the pocket was chosen, and the incision and pocket were anesthetized with lidocaine. A 3 cm transverse skin incision was made. Subcutaneous tissue was  divided with Bovie electrocautery. The port pocket was created with a mixture of Bovie electrocautery and blunt dissection. The port was tunneled from the incision to the cannulation site with the tunneling device. The port was secured to the investing pectoral fascia with two 3-0 Vicryl sutures on the superior corners of the port. The catheter was measured for length appropriately and cut. Under fluoroscopic guidance, the split sheath and dilator were placed over the guidewire, and the guidewire and dilator were then removed. The catheter was placed down the split sheath and the sheath was split and peeled away. Fluoroscopy confirmed appropriate position of the catheter, which aspirated and flushed easily. Heparinized saline was instilled in the catheter followed by normal saline. The skin incision was closed in layers with deep buried interrupted 3-0 Vicryl and  running subcuticular 4-0 Vicryl. The cannulation incision was closed with a buried interrupted 4-0 Monocryl stitch. A sterile dressing was applied. The patient was transported to the recovery room in stable condition. All sponge, instrument and needle counts were correct at the end of the procedure.    I was present for the entire procedure.    Reggie Franklin MD

## 2019-06-04 NOTE — BRIEF OP NOTE
[facility]  Brief Operative Note     SUMMARY     Surgery Date: 6/4/2019     Surgeon(s) and Role:     * Reggie Franklin Jr., MD - Primary     * Светлана Davis MD - Resident - Assisting        Pre-op Diagnosis:  Small cell lung cancer, left [C34.92]    Post-op Diagnosis:  Post-Op Diagnosis Codes:     * Small cell lung cancer, left [C34.92]    Procedure(s) (LRB):  [id number] LEFT NECK (Left)    Anesthesia: Local MAC    Description of the findings of the procedure: Left subclavian port placement      Estimated Blood Loss: * No values recorded between 6/4/2019  7:33 AM and 6/4/2019  8:11 AM *         Specimens:   Specimen (12h ago, onward)    None          Discharge Note    SUMMARY     Admit Date: 6/4/2019    Discharge Date and Time:  06/04/2019 8:21 AM    Hospital Course (synopsis of major diagnoses, care, treatment, and services provided during the course of the hospital stay): Patient was evaluated and consented preoperatively. They were taken back to the operating room where they underwent the above stated procedure. For full details, see operative note. They were transported to the recovery room in hemodynamically stable condition. Postoperatively, they were tolerating PO intake and pain and nausea were well-controlled on PRN PO medications. Deemed fit for discharge home the day of surgery.         Final Diagnosis: Post-Op Diagnosis Codes:     * Small cell lung cancer, left [C34.92]    Disposition: Home or Self Care    Follow Up/Patient Instructions:     Medications:  Reconciled Home Medications:      Medication List      CHANGE how you take these medications    levothyroxine 50 MCG tablet  Commonly known as:  SYNTHROID  Take 50 mcg by mouth every morning.  What changed:  Another medication with the same name was removed. Continue taking this medication, and follow the directions you see here.        CONTINUE taking these medications    atenolol 50 MG tablet  Commonly  known as:  TENORMIN  Take 50 mg by mouth once daily.     budesonide-formoterol 160-4.5 mcg 160-4.5 mcg/actuation Hfaa  Commonly known as:  SYMBICORT  Inhale 2 puffs into the lungs every 12 (twelve) hours. Controller     clonazePAM 0.5 MG tablet  Commonly known as:  KLONOPIN  TAKE ONE TABLET BY MOUTH THREE TIMES DAILY AS NEEDED FOR ANXIETY OR for panic attacks     DECARA 50,000 unit capsule  Generic drug:  cholecalciferol (vitamin D3)  Take 50,000 Units by mouth every 7 days.     furosemide 20 MG tablet  Commonly known as:  LASIX  Take 20 mg by mouth once daily.     lidocaine-prilocaine cream  Commonly known as:  EMLA  Apply to port site 1 hour prior to chemotherapy and cover with saran wrap     lisinopril 5 MG tablet  Commonly known as:  PRINIVIL,ZESTRIL  Take 5 mg by mouth once daily.     ondansetron 8 MG Tbdl  Commonly known as:  ZOFRAN-ODT  Dissolve 1 tablet (8 mg total) under tongue every 8 (eight) hours as needed (nausea).     pantoprazole 40 MG tablet  Commonly known as:  PROTONIX  TAKE ONE TABLET BY MOUTH EVERY DAY FOR stomach     pravastatin 20 MG tablet  Commonly known as:  PRAVACHOL  Take 20 mg by mouth once daily.     sotalol 80 MG tablet  Commonly known as:  BETAPACE  TAKE ONE TABLET BY MOUTH TWICE DAILY FOR BLOOD PRESSURE AND HEART     tiotropium bromide 2.5 mcg/actuation Mist  Commonly known as:  SPIRIVA RESPIMAT  Inhale 5 mcg into the lungs once daily. Controller     traMADol 50 mg tablet  Commonly known as:  ULTRAM  Take 50 mg by mouth every 12 (twelve) hours as needed.     VENTOLIN HFA 90 mcg/actuation inhaler  Generic drug:  albuterol  Ventolin HFA 90 mcg/actuation aerosol inhaler     VITAMIN D2 50,000 unit Cap  Generic drug:  ergocalciferol  Take 50,000 Units by mouth every 7 days. Tuesday        STOP taking these medications    clopidogrel 75 mg tablet  Commonly known as:  PLAVIX     potassium chloride SA 20 MEQ tablet  Commonly known as:  K-DURJOSUEOR-CON          Discharge Procedure Orders   No  driving until:   Order Comments: While taking narcotic pain medications.     Notify your health care provider if you experience any of the following:  temperature >100.4     Notify your health care provider if you experience any of the following:  persistent nausea and vomiting or diarrhea     Notify your health care provider if you experience any of the following:  severe uncontrolled pain     Notify your health care provider if you experience any of the following:  redness, tenderness, or signs of infection (pain, swelling, redness, odor or green/yellow discharge around incision site)     No dressing needed     Activity as tolerated   Order Comments: - Ok to shower on Thursday 6/6.   - No submersion of surgical wounds (bath, pool, hot tub, etc) for two weeks.  - Take ibuprofen (Advil, Motrin) or acetaminophen (Tylenol) for soreness associated with port placement     Follow-up Information     Reggie Franklin Jr, MD In 2 weeks.    Specialties:  General Surgery, Bariatrics  Why:  For wound re-check  Contact information:  [address]  [facility] [address]  [phone]

## 2019-06-04 NOTE — ANESTHESIA POSTPROCEDURE EVALUATION
Anesthesia Post Evaluation    Patient: Rosita Almodovar    Procedure(s) Performed: Procedure(s) (LRB):  [id number]-M-CATH LEFT NECK (Left)    Final Anesthesia Type: MAC  Patient location during evaluation: PACU  Patient participation: Yes- Able to Participate  Level of consciousness: awake and alert and oriented  Post-procedure vital signs: reviewed and stable  Pain management: adequate  Airway patency: patent  PONV status at discharge: No PONV  Anesthetic complications: no      Cardiovascular status: blood pressure returned to baseline  Respiratory status: unassisted, room air and spontaneous ventilation  Hydration status: euvolemic  Follow-up not needed.          Vitals Value Taken Time   /58 6/4/2019  8:47 AM   Temp 36.1 °C (97 °F) 6/4/2019  8:45 AM   Pulse 72 6/4/2019  8:47 AM   Resp 26 6/4/2019  8:47 AM   SpO2 95 % 6/4/2019  8:47 AM   Vitals shown include unvalidated device data.      No case tracking events are documented in the log.      Pain/Zack Score: Zack Score: 10 (6/4/2019  8:45 AM)

## 2019-06-04 NOTE — DISCHARGE INSTRUCTIONS
POSTOPERATIVE INSTRUCTIONS FOLLOWING PORT-A-CATH PLACMENT    The following are post-operative instructions that will help you to recover from your surgery.  Please read over these instructions carefully and contact us if we can answer any of your questions or concerns.    Dressing  You may remove the clear top dressing (plastic wrap looking dressing with attached gauze) after 48 hours. Please do not remove the white strips of tape (steri-strips) that cover your incision- they will be removed at your clinic visit. After 48 hours you can shower/wash over the incision/steri-strips with antibacterial soap and warm water. Do not take a tub bath and do not soak the surgical site.     Activity   You should be able to return to your regular activities 2 days after your surgery.  However, do not engage in strenuous activities in which you use your upper body such as:  golf, tennis, aerobics, washing windows, raking the yard, mopping, vacuuming, heavy lifting (e.g children) until you are seen for your follow-up appointment in clinic. Do not lift anything heavier than a gallon of milk.    Medication for pain  You may find that over the counter pain medications may be sufficient for your pain.  You will be given a prescription for pain medication for more severe pain.  You should not drive or operate machinery while taking these.  Please take prescription pain medicine (narcotics) with food.  Narcotics can cause, or worsen, constipation.  You will need to increase your fluid intake, eat high fiber foods (such as fruits and bran) and make sure that you are up and walking. You may need to take an over the counter stool softener for constipation.    Please report the following:   Temperature greater than 101 degrees   Discharge or bad odor from the wound   Excessive bleeding, such as saturated bloody dressing or extreme bruising   Redness at incision and/or drain sites   Swelling or buildup of fluid around  incision   Shortness of breath    Additional information  Your surgeon or medical oncologist will see you approximately 1-2 weeks following your surgery to check the incision.  If this follow-up appointment has not been made, please call the office.    If you have any questions or problems, please call my office or my nurse.    After hours and on weekends, you may call the main [facility] line at [phone] and ask to have the general surgery resident paged.      Vascular Access Port Implantation   Port implantation is surgery to place (implant) a port under the skin. For vascular access, it is placed into a vein. The port allows medicines or nutrition to be sent right into your bloodstream. Blood can also be taken or given through the port. During the procedure, a long, thin tube called a catheter is threaded into one of your large veins. The tube is then attached to the port. This usually sits under the skin of your chest and causes a small bump. To use the port, a special needle is passed through your skin and into the port. The needle can stay in your skin for up to 7 days, if needed. A port can stay in place for weeks or months or longer.    Why is a vascular access port needed?  A vascular access port may allow healthcare providers to give you:  · Chemotherapy or other cancer-fighting drugs  · IV treatments, such as antibiotics or nutrition  · Hemodialysis (for kidney failure)  The port may also be used to draw blood.  Before the procedure  Follow any instructions you are given on how to prepare.  Tell your provider about any medicines you are taking. This includes:  · All prescription medicines  · Over-the-counter medicines such as aspirin or ibuprofen  · Herbs, vitamins, and other supplements  Also be sure your provider knows:  · If you are pregnant or think you may be pregnant  · If you are allergic to any medicines or substances, especially local anesthetics or iodine  · Your full medical history,  including why you will need the port  · If you plan on doing any contact sports  During the procedure  · Before the procedure, an IV may be put into a vein in your arm or hand. This gives you fluids and medicines. You may be given medicine through the IV to help you relax during the procedure. This is called sedation. But some surgeons place ports using general anesthesia.  · The chest is used most often for the port. In some cases, your belly (abdomen) or arm will be used instead.  · The skin over the insertion area is numbed with local anesthetic.  · Ultrasound or X-rays are used to help the healthcare provider guide the catheter into the proper location during the procedure.  · A cut (incision) is made in the skin where the port will be placed. A small pocket for the port is formed under the skin.  · A second small incision is made in the skin near the first incision. A tunnel under the skin is created. The catheter is put through the tunnel and into the blood vessel.  · The skin is closed over the port. It is held shut with stitches (sutures) or surgical glue or tape. The second small incision is also closed.  · A chest X-ray may be done to make sure the port is placed properly.  After the procedure  You may be taken to a recovery room where youll recover from the sedation. Nurses will check on you as you rest. If you have pain, nurses can give you medicine. If you are not staying in the hospital overnight, you will be sent home a few hours after the procedure is done. A healthcare provider will tell you when you can go home. An adult family member or friend will need to drive you home.  Recovering at home  · Take pain medicine as directed by your healthcare provider.  · Take it easy for 24 hours after the procedure. Avoid physical activity and heavy lifting until your healthcare provider says its OK.  · Keep the port clean and dry. Ask when you can shower again. You will need to keep the port dry by covering  it when you shower.  · Care for the insertion site as you are directed.  · Dont swim, bathe, or do other activities that cause water to cover the insertion site.  · To keep the port from getting blocked with blood clots, flush it as often as directed. You should be shown the proper way to flush the port before you go home. It is important to follow these directions.     Risks and possible complications of implantation  · Bleeding  · Infection of the insertion site  · Damage to a blood vessel  · Nerve injury or irritation  · Collapsed lung (for chest port placements)  · Skin breakdown over the port  Risks and possible complications of having a port  · Blocked  port or catheter  · Leakage or breakage of the port or catheter  · The port moves out of position  · Blood clot  · Skin or bloodstream infection  · Skin breakdown over the port      When to seek medical care  Call your healthcare provider right away if you have any of the following:  · A fever of 100.4°F (38.0°C) or higher  · You can't access or use the port properly  · You can't flush the port or get a blood return  · The skin near the port is red, warm, swollen, or broken  · You have shoulder pain on the side where the port is located  · You feel a heart flutter or racing heart   · Swollen arm, if the port is placed in your arm   Date Last Reviewed: 7/1/2016  © 2488-2672 [employer]. [address] [place] [address], [place] [address]. All rights reserved. This information is not intended as a substitute for professional medical care. Always follow your healthcare professional's instructions.

## 2019-06-05 ENCOUNTER — OFFICE VISIT (OUTPATIENT)
Dept: HEMATOLOGY/ONCOLOGY | Facility: CLINIC | Age: 79
End: 2019-06-05
Payer: MEDICARE

## 2019-06-05 ENCOUNTER — INFUSION (OUTPATIENT)
Dept: INFUSION THERAPY | Facility: HOSPITAL | Age: 79
End: 2019-06-05
Attending: INTERNAL MEDICINE
Payer: MEDICARE

## 2019-06-05 VITALS
DIASTOLIC BLOOD PRESSURE: 68 MMHG | TEMPERATURE: 98 F | SYSTOLIC BLOOD PRESSURE: 118 MMHG | RESPIRATION RATE: 18 BRPM | HEART RATE: 77 BPM | HEIGHT: 62 IN | BODY MASS INDEX: 22.71 KG/M2 | OXYGEN SATURATION: 94 % | WEIGHT: 123.44 LBS

## 2019-06-05 VITALS
HEIGHT: 62 IN | TEMPERATURE: 98 F | DIASTOLIC BLOOD PRESSURE: 66 MMHG | RESPIRATION RATE: 14 BRPM | SYSTOLIC BLOOD PRESSURE: 112 MMHG | BODY MASS INDEX: 22.49 KG/M2 | OXYGEN SATURATION: 94 % | HEART RATE: 78 BPM

## 2019-06-05 DIAGNOSIS — E86.0 DEHYDRATION: ICD-10-CM

## 2019-06-05 DIAGNOSIS — E87.6 HYPOKALEMIA DUE TO INADEQUATE POTASSIUM INTAKE: Primary | ICD-10-CM

## 2019-06-05 DIAGNOSIS — E83.42 HYPOMAGNESEMIA: ICD-10-CM

## 2019-06-05 DIAGNOSIS — C34.92 SMALL CELL LUNG CANCER, LEFT: Primary | ICD-10-CM

## 2019-06-05 DIAGNOSIS — E87.6 HYPOKALEMIA DUE TO INADEQUATE POTASSIUM INTAKE: ICD-10-CM

## 2019-06-05 PROCEDURE — 96367 TX/PROPH/DG ADDL SEQ IV INF: CPT

## 2019-06-05 PROCEDURE — 96360 HYDRATION IV INFUSION INIT: CPT

## 2019-06-05 PROCEDURE — 99999 PR PBB SHADOW E&M-EST. PATIENT-LVL IV: ICD-10-PCS | Mod: PBBFAC,GC,, | Performed by: STUDENT IN AN ORGANIZED HEALTH CARE EDUCATION/TRAINING PROGRAM

## 2019-06-05 PROCEDURE — 99215 PR OFFICE/OUTPT VISIT, EST, LEVL V, 40-54 MIN: ICD-10-PCS | Mod: S$PBB,GC,, | Performed by: STUDENT IN AN ORGANIZED HEALTH CARE EDUCATION/TRAINING PROGRAM

## 2019-06-05 PROCEDURE — 63600175 PHARM REV CODE 636 W HCPCS: Performed by: INTERNAL MEDICINE

## 2019-06-05 PROCEDURE — 25000003 PHARM REV CODE 250: Performed by: STUDENT IN AN ORGANIZED HEALTH CARE EDUCATION/TRAINING PROGRAM

## 2019-06-05 PROCEDURE — 96361 HYDRATE IV INFUSION ADD-ON: CPT

## 2019-06-05 PROCEDURE — 99215 OFFICE O/P EST HI 40 MIN: CPT | Mod: S$PBB,GC,, | Performed by: STUDENT IN AN ORGANIZED HEALTH CARE EDUCATION/TRAINING PROGRAM

## 2019-06-05 PROCEDURE — 96365 THER/PROPH/DIAG IV INF INIT: CPT

## 2019-06-05 PROCEDURE — 25000003 PHARM REV CODE 250: Performed by: INTERNAL MEDICINE

## 2019-06-05 PROCEDURE — 96366 THER/PROPH/DIAG IV INF ADDON: CPT

## 2019-06-05 PROCEDURE — 99214 OFFICE O/P EST MOD 30 MIN: CPT | Mod: PBBFAC,25 | Performed by: STUDENT IN AN ORGANIZED HEALTH CARE EDUCATION/TRAINING PROGRAM

## 2019-06-05 PROCEDURE — 63600175 PHARM REV CODE 636 W HCPCS: Performed by: STUDENT IN AN ORGANIZED HEALTH CARE EDUCATION/TRAINING PROGRAM

## 2019-06-05 PROCEDURE — 99999 PR PBB SHADOW E&M-EST. PATIENT-LVL IV: CPT | Mod: PBBFAC,GC,, | Performed by: STUDENT IN AN ORGANIZED HEALTH CARE EDUCATION/TRAINING PROGRAM

## 2019-06-05 RX ORDER — POTASSIUM CHLORIDE 20 MEQ/1
40 TABLET, EXTENDED RELEASE ORAL ONCE
Status: COMPLETED | OUTPATIENT
Start: 2019-06-05 | End: 2019-06-05

## 2019-06-05 RX ORDER — POTASSIUM CHLORIDE 750 MG/1
40 TABLET, EXTENDED RELEASE ORAL ONCE
Status: CANCELLED
Start: 2019-06-05

## 2019-06-05 RX ORDER — SODIUM CHLORIDE AND POTASSIUM CHLORIDE 150; 900 MG/100ML; MG/100ML
INJECTION, SOLUTION INTRAVENOUS CONTINUOUS
Status: DISCONTINUED | OUTPATIENT
Start: 2019-06-05 | End: 2019-06-05 | Stop reason: HOSPADM

## 2019-06-05 RX ORDER — POTASSIUM CHLORIDE 20 MEQ/1
20 TABLET, EXTENDED RELEASE ORAL 2 TIMES DAILY
Qty: 8 TABLET | Refills: 0 | Status: SHIPPED | OUTPATIENT
Start: 2019-06-05 | End: 2019-07-03 | Stop reason: SDUPTHER

## 2019-06-05 RX ORDER — HEPARIN 100 UNIT/ML
500 SYRINGE INTRAVENOUS
Status: COMPLETED | OUTPATIENT
Start: 2019-06-05 | End: 2019-06-05

## 2019-06-05 RX ADMIN — MAGNESIUM SULFATE HEPTAHYDRATE 3 G: 500 INJECTION, SOLUTION INTRAMUSCULAR; INTRAVENOUS at 02:06

## 2019-06-05 RX ADMIN — POTASSIUM CHLORIDE AND SODIUM CHLORIDE: 900; 150 INJECTION, SOLUTION INTRAVENOUS at 12:06

## 2019-06-05 RX ADMIN — HEPARIN 500 UNITS: 100 SYRINGE at 03:06

## 2019-06-05 RX ADMIN — POTASSIUM CHLORIDE 40 MEQ: 1500 TABLET, EXTENDED RELEASE ORAL at 12:06

## 2019-06-05 NOTE — PROGRESS NOTES
PATIENT: Rosita Almodovar  MRN: [id number]  DATE: 6/5/2019      Diagnosis:   1. 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  See comment.  Comment:  The left lung biopsy shows a malignant proliferation of cells with increased nuclear cytoplasmic ratio, stippled chromatin pattern with small amount of cytoplasm. Some of cells show molding and crush artifact. The majority of the cells are relatively small in size, however there are a few areas with larger cells within increased amount of cytoplasm. The cells are arranged in sheets and nests with brisk mitotic activity and areas of necrosis. Immunohistochemical stains reveal the tumor cells to stain positively with cytokeratin AE1/AE3, synaptophysin, chromogranin and TTF-1. Immunohistochemical stain for p63 is negative within the tumor cells. Immunostain for cytokeratin 7 shows positive staining in the background lung but is negative within tumor cells. All stains have atisfactory positive negative controls. 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 Correlate clinically.        Subjective:    Interval History: Ms. Almodovar is here for extensive stage small cell lung cancer prior to cycle 1.    She did not  potassium last week after her intravenous infusion of fluids and magnesium.  She wasn't sure what the phone calls were about so she ignored  them.  She still has low appetite and mostly drinks water.  She has difficulty with potassium pills because of the nausea. Denies fevers, chills, chest pain, or hemoptysis.  Still has shortness of breath with exertion and at rest.    Her  accompanies her at this visit.   reports Dr. Corbett is planning to perform radiation and would like us to speak with him when we start chemotherapy.    Past Medical History:   Past Medical History:   Diagnosis Date    Acquired hypothyroidism 5/8/2019    Brain tumor     COPD (chronic obstructive pulmonary disease) 5/8/2019    Gastric ulcer     Hilar mass     Kidney cysts     left    Pleural effusion        Past Surgical HIstory:   Past Surgical History:   Procedure Laterality Date    ANGIOGRAM, CORONARY, WITH LEFT HEART CATHETERIZATION      endoscope      [id number]-T-CATH LEFT NECK Left 6/4/2019    Performed by Reggie Franklin Jr., MD at [facility] OR [address] [place] [address]    kidney cyst excision and drainage         Family History: No family history on file.    Social History:  reports that she quit smoking about 5 years ago. Her smoking use included cigarettes. She has a 54.00 pack-year smoking history. She has never used smokeless tobacco. She reports that she does not drink alcohol or use drugs.    Allergies:  Review of patient's allergies indicates:   Allergen Reactions    Potassium Nausea Only       Medications:  Current Outpatient Medications   Medication Sig Dispense Refill    albuterol (VENTOLIN HFA) 90 mcg/actuation inhaler Ventolin HFA 90 mcg/actuation aerosol inhaler      atenolol (TENORMIN) 50 MG tablet Take 50 mg by mouth once daily.  1    budesonide-formoterol 160-4.5 mcg (SYMBICORT) 160-4.5 mcg/actuation HFAA Inhale 2 puffs into the lungs every 12 (twelve) hours. Controller 1 Inhaler 11    clonazePAM (KLONOPIN) 0.5 MG tablet TAKE ONE TABLET BY MOUTH THREE TIMES DAILY AS NEEDED FOR ANXIETY OR for panic attacks  3    DECARA 50,000 unit capsule Take  50,000 Units by mouth every 7 days.  3    ergocalciferol (VITAMIN D2) 50,000 unit Cap Take 50,000 Units by mouth every 7 days. Tuesday      furosemide (LASIX) 20 MG tablet Take 20 mg by mouth once daily.       levothyroxine (SYNTHROID) 50 MCG tablet Take 50 mcg by mouth every morning.  3    lidocaine-prilocaine (EMLA) cream Apply to port site 1 hour prior to chemotherapy and cover with saran wrap 30 g 3    lisinopril (PRINIVIL,ZESTRIL) 5 MG tablet Take 5 mg by mouth once daily.       ondansetron (ZOFRAN-ODT) 8 MG TbDL Dissolve 1 tablet (8 mg total) under tongue every 8 (eight) hours as needed (nausea). 30 tablet 3    pantoprazole (PROTONIX) 40 MG tablet TAKE ONE TABLET BY MOUTH EVERY DAY FOR stomach  3    pravastatin (PRAVACHOL) 20 MG tablet Take 20 mg by mouth once daily.       sotalol (BETAPACE) 80 MG tablet TAKE ONE TABLET BY MOUTH TWICE DAILY FOR BLOOD PRESSURE AND HEART  1    tiotropium bromide 2.5 mcg/actuation Mist Inhale 5 mcg into the lungs once daily. Controller 4 g 5    traMADol (ULTRAM) 50 mg tablet Take 50 mg by mouth every 12 (twelve) hours as needed.   3     No current facility-administered medications for this visit.        Review of Systems   Constitutional: Positive for activity change, appetite change, fatigue and unexpected weight change. Negative for chills and fever.   HENT: Negative for nosebleeds.    Eyes: Negative for visual disturbance.   Respiratory: Positive for shortness of breath. Negative for cough.    Cardiovascular: Negative for chest pain and leg swelling.   Gastrointestinal: Negative for abdominal distention, abdominal pain, constipation, diarrhea and nausea.   Endocrine: Negative for cold intolerance and heat intolerance.   Genitourinary: Negative for dysuria.   Musculoskeletal: Positive for back pain (chronic unchanged).   Skin: Negative for color change and rash.   Neurological: Positive for dizziness, weakness, light-headedness and numbness.   Hematological: Negative  "for adenopathy.   Psychiatric/Behavioral: Negative for confusion.       ECOG Performance Status:  3  Objective:      Vitals:   Vitals:    06/05/19 0952   BP: 112/66   BP Location: Left arm   Patient Position: Sitting   BP Method: Large (Automatic)   Pulse: 78   Resp: 14   Temp: 97.9 °F (36.6 °C)   TempSrc: Oral   SpO2: (!) 94%   Height: 5' 1.5" (1.562 m)     BMI: Body mass index is 22.49 kg/m².   Wt Readings from Last 10 Encounters:   06/04/19 54.9 kg (121 lb)   05/30/19 54.9 kg (121 lb)   05/29/19 54.9 kg (121 lb)   05/29/19 54.9 kg (121 lb)   05/29/19 55.1 kg (121 lb 7.6 oz)   05/14/19 59 kg (130 lb 1.1 oz)   05/10/19 59 kg (130 lb)   05/09/19 59.3 kg (130 lb 11.7 oz)   05/07/19 59.9 kg (132 lb)   02/18/19 64.2 kg (141 lb 8.6 oz)         Physical Exam   Constitutional: She appears well-developed.   HENT:   Head: Normocephalic.   Eyes: Pupils are equal, round, and reactive to light. EOM are normal. No scleral icterus.   Neck: Normal range of motion. No tracheal deviation present.   Cardiovascular: Normal rate, regular rhythm and normal heart sounds. Exam reveals no gallop and no friction rub.   No murmur heard.  Pulmonary/Chest: Effort normal. No respiratory distress. She has no wheezes. She has no rales.   Diminished breath sounds left worse than right.   Abdominal: Soft. Bowel sounds are normal. She exhibits no distension and no mass. There is no tenderness. There is no guarding.   Musculoskeletal: Normal range of motion. She exhibits edema (1+ pitting).   Lymphadenopathy:     She has no cervical adenopathy.   Neurological: She is alert.   Skin: Skin is warm and dry.       Laboratory Data: Results for CSAI MACARIO (MRN [id number]) as of 5/29/2019 08:45   Ref. Range 5/9/2019 04:35   TSH Latest Ref Range: 0.45 - 5.33 uIU/mL 0.86   Results for CASI MACARIO (MRN [id number]) as of 5/29/2019 08:45   Ref. Range 5/10/2019 05:04   Hemoglobin A1C External Latest Ref Range: 4.0 - 5.6 % 5.7 (H)       Imaging:     "     Assessment:       1. Small cell lung cancer, left    2. Hypokalemia due to inadequate potassium intake    3. Hypomagnesemia           Plan:       1. 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  While treating small cell lung cancer is the priority, typical approach to purely large cell neuroendocrine is similar to that of NSCLC.  Small cell lung cancer treatment overlaps with that of NSCLC with platinum and immunotherapy components.  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  Has significant hypokalemia and hypomagnesemia still despite IVF last week.  -Plan for carboplatin, etoposide, and atezolizumab. Reschedule plan : C1D1 6/7/19, C1D2 6/10/19, C1D3 6/11/19  --Will have her get IVF, potassium, and magnesium today instead of chemo  --Will have repeat labs tomorrow and 6/7/19.  -Will discuss XRT with Dr. Corbett  -Will have  get PT to her home.  -s/p port placement 6/4/19  --Plavix held for port placement  -If asymptomatic, defer on whole brain radiation for now, will re-image body and brain after 4 cycles, and then refer to radiation oncology for WBRT.  -Return to clinic 6/5/19 with cmp, cbc  --Monitor TSH and HbA1c q4 cycles    No orders of the defined types were placed in this encounter.        Stephan Atkinson MD  Hematology Oncology Fellow PGY5  Pager [phone]  Distress Screening Results: Psychosocial Distress screening score of Distress Score: 0 noted and reviewed. No intervention indicated.

## 2019-06-05 NOTE — Clinical Note
1.  Please notify chemo nurses she will only be getting iv fluids today.  2. Labs Cmp, magnesium tomorrow and Friday  3.  Reschedule cycle 1 day 1 to Friday.  4.  Reschedule cycle 1 day 2 and day 3 to Monday and TuesdayOlena easley

## 2019-06-05 NOTE — Clinical Note
Hi Dr. Corbett,Patient told me you were planning to perform the radiation surgery and that I should update you on when exactly chemotherapy was starting.  We plan to start Friday (she's still too hypokalemic and hypomagnesemic to start today).  Since this is small cell, ideally we feel she should get whole brain radiation.Stephan Atkinson MDHematology Oncology Fellow PGY5

## 2019-06-05 NOTE — PROGRESS NOTES
[facility] - Hematology Oncology    HOME HEALTH ORDERS  FACE TO FACE ENCOUNTER    Patient Name: Rosita Almodovar  YOB: 1940    PCP: Florecita Jameson NP   PCP Address: [address] [place] / [place] [address]  PCP Phone Number: [phone]  PCP Fax: [phone]    Encounter Date: 06/05/2019    Admit to Home Health    Diagnoses:  There are no hospital problems to display for this patient.      Future Appointments   Date Time Provider Department Center   6/6/2019  1:30 PM LAB, HEMONC [facility] DAY [facility]   6/7/2019  9:45 AM LAB, HEMONC [facility]   6/7/2019 10:30 AM [facility]   6/10/2019 11:45 AM LAB, HEMONC [facility]   6/10/2019  2:00 PM [facility]   6/11/2019 11:45 AM LAB, HEMONC [facility] DAY [facility]   6/11/2019  2:30 PM [facility]   6/14/2019 10:45 AM Reggie Franklin Jr., MD [facility]           I have seen and examined this patient face to face today. My clinical findings that support the need for the home health skilled services and home bound status are the following:  Weakness/numbness causing balance and gait disturbance due to Weakness/Debility, Malignancy/Cancer and Dehydration making it taxing to leave home.  Requiring assistive device to leave home due to unsteady gait caused by  Weakness/Debility, Malignancy/Cancer and Dehydration.  Medical restrictions requiring assistance of another human to leave home due to  Dyspnea on exertion (SOB) and Unstable ambulation.    Allergies:  Review of patient's allergies indicates:   Allergen Reactions    Potassium Nausea Only       Diet: regular diet    Activities: activity as tolerated    Nursing:   SN to complete comprehensive assessment including routine vital signs. Instruct on disease process and s/s of complications to report to MD. Review/verify medication list sent home with the  patient at time of discharge  and instruct patient/caregiver as needed. Frequency may be adjusted depending on start of care date.    Notify MD if SBP > 160 or < 90; DBP > 90 or < 50; HR > 120 or < 50; Temp > 101; Other:         CONSULTS:    Physical Therapy to evaluate and treat. Evaluate for home safety and equipment needs; Establish/upgrade home exercise program. Perform / instruct on therapeutic exercises, gait training, transfer training, and Range of Motion.  Occupational Therapy to evaluate and treat. Evaluate home environment for safety and equipment needs. Perform/Instruct on transfers, ADL training, ROM, and therapeutic exercises.  Aide to provide assistance with personal care, ADLs, and vital signs.    MISCELLANEOUS CARE:  N/A    WOUND CARE ORDERS  n/a      Medications: Review discharge medications with patient and family and provide education.      Cannot display discharge medications since this is not an admission.      I certify that this patient is confined to her home and needs intermittent skilled nursing care, physical therapy and occupational therapy.

## 2019-06-05 NOTE — PLAN OF CARE
Problem: Adult Inpatient Plan of Care  Goal: Plan of Care Review  Outcome: Ongoing (interventions implemented as appropriate)  Pt admitted for 1L NS with 20 KCL and Magnesium  3 gm IVPB . Pt had been scheduled for chemo (Tecentriq, Carboplatin, Etoposide) but chemo deferred due  to abnormal labs, Pt and spouse understand. Handout on chemo agents given to patient and side effects and self-care tips discussed.  Pt completed treatment, tolerated well. Plan of care reviewed and AVS given to Pt, Pt discharged @ 15:42

## 2019-06-06 ENCOUNTER — TELEPHONE (OUTPATIENT)
Dept: HEMATOLOGY/ONCOLOGY | Facility: CLINIC | Age: 79
End: 2019-06-06

## 2019-06-06 NOTE — TELEPHONE ENCOUNTER
Message received.    Stephan Atkinson MD  Hematology Oncology Fellow PGY5  Pager [phone]    ----- Message from Ana Bailey RN sent at 6/6/2019 11:35 AM CDT -----  Contact: pt       ----- Message -----  From: Jewels Holloway  Sent: 6/6/2019  11:26 AM  To: China Rothman Staff    Returned call patient states she is not coming today because of the bad weather. She states she will be here tomorrow.  ----- Message -----  From: Katherine Dos Santos  Sent: 6/6/2019  11:11 AM  To: Jewels Holloway    Pt called to reschedule appt 6/6 for Labs due to weather   Callback#[phone]  Thank You  EDWIN Dos Santos

## 2019-06-07 ENCOUNTER — INFUSION (OUTPATIENT)
Dept: INFUSION THERAPY | Facility: HOSPITAL | Age: 79
End: 2019-06-07
Attending: INTERNAL MEDICINE
Payer: MEDICARE

## 2019-06-07 ENCOUNTER — DOCUMENTATION ONLY (OUTPATIENT)
Dept: HEMATOLOGY/ONCOLOGY | Facility: CLINIC | Age: 79
End: 2019-06-07

## 2019-06-07 VITALS
RESPIRATION RATE: 20 BRPM | SYSTOLIC BLOOD PRESSURE: 111 MMHG | HEART RATE: 81 BPM | TEMPERATURE: 98 F | DIASTOLIC BLOOD PRESSURE: 56 MMHG

## 2019-06-07 DIAGNOSIS — C34.92 SMALL CELL LUNG CANCER, LEFT: Primary | ICD-10-CM

## 2019-06-07 PROCEDURE — 96417 CHEMO IV INFUS EACH ADDL SEQ: CPT

## 2019-06-07 PROCEDURE — 63600175 PHARM REV CODE 636 W HCPCS: Mod: JG | Performed by: INTERNAL MEDICINE

## 2019-06-07 PROCEDURE — 96367 TX/PROPH/DG ADDL SEQ IV INF: CPT

## 2019-06-07 PROCEDURE — 96413 CHEMO IV INFUSION 1 HR: CPT

## 2019-06-07 PROCEDURE — 25000003 PHARM REV CODE 250: Performed by: INTERNAL MEDICINE

## 2019-06-07 PROCEDURE — 36593 DECLOT VASCULAR DEVICE: CPT

## 2019-06-07 RX ORDER — SODIUM CHLORIDE 0.9 % (FLUSH) 0.9 %
10 SYRINGE (ML) INJECTION
Status: DISCONTINUED | OUTPATIENT
Start: 2019-06-07 | End: 2019-06-07 | Stop reason: HOSPADM

## 2019-06-07 RX ORDER — HEPARIN 100 UNIT/ML
500 SYRINGE INTRAVENOUS
Status: CANCELLED | OUTPATIENT
Start: 2019-06-08

## 2019-06-07 RX ORDER — HEPARIN 100 UNIT/ML
500 SYRINGE INTRAVENOUS
Status: CANCELLED | OUTPATIENT
Start: 2019-06-07

## 2019-06-07 RX ORDER — SODIUM CHLORIDE 0.9 % (FLUSH) 0.9 %
10 SYRINGE (ML) INJECTION
Status: CANCELLED | OUTPATIENT
Start: 2019-06-12

## 2019-06-07 RX ORDER — SODIUM CHLORIDE 0.9 % (FLUSH) 0.9 %
10 SYRINGE (ML) INJECTION
Status: CANCELLED | OUTPATIENT
Start: 2019-06-07

## 2019-06-07 RX ORDER — HEPARIN 100 UNIT/ML
500 SYRINGE INTRAVENOUS
Status: DISCONTINUED | OUTPATIENT
Start: 2019-06-07 | End: 2019-06-07 | Stop reason: HOSPADM

## 2019-06-07 RX ORDER — HEPARIN 100 UNIT/ML
500 SYRINGE INTRAVENOUS
Status: CANCELLED | OUTPATIENT
Start: 2019-06-12

## 2019-06-07 RX ORDER — SODIUM CHLORIDE 0.9 % (FLUSH) 0.9 %
10 SYRINGE (ML) INJECTION
Status: CANCELLED | OUTPATIENT
Start: 2019-06-08

## 2019-06-07 RX ADMIN — ALTEPLASE 2 MG: 2.2 INJECTION, POWDER, LYOPHILIZED, FOR SOLUTION INTRAVENOUS at 11:06

## 2019-06-07 RX ADMIN — ATEZOLIZUMAB 1200 MG: 1200 INJECTION, SOLUTION INTRAVENOUS at 12:06

## 2019-06-07 RX ADMIN — ETOPOSIDE 160 MG: 20 INJECTION INTRAVENOUS at 02:06

## 2019-06-07 RX ADMIN — SODIUM CHLORIDE: 0.9 INJECTION, SOLUTION INTRAVENOUS at 11:06

## 2019-06-07 RX ADMIN — CARBOPLATIN 400 MG: 10 INJECTION, SOLUTION INTRAVENOUS at 01:06

## 2019-06-07 RX ADMIN — HEPARIN 500 UNITS: 100 SYRINGE at 03:06

## 2019-06-07 RX ADMIN — APREPITANT 130 MG: 130 INJECTION, EMULSION INTRAVENOUS at 11:06

## 2019-06-07 RX ADMIN — PALONOSETRON HYDROCHLORIDE: 0.25 INJECTION INTRAVENOUS at 12:06

## 2019-06-07 NOTE — PLAN OF CARE
Problem: Adult Inpatient Plan of Care  Goal: Plan of Care Review  Outcome: Ongoing (interventions implemented as appropriate)  Pt educated on treatment plan and potential side effects. Pt tolerated Tecentriq, Carbo, and Etoposide with no complications. VSS. Pt instructed to call MD with any problems. NAD. AVS given to patient. Pt discharged home via wheelchair.

## 2019-06-10 ENCOUNTER — TELEPHONE (OUTPATIENT)
Dept: HEMATOLOGY/ONCOLOGY | Facility: CLINIC | Age: 79
End: 2019-06-10

## 2019-06-10 NOTE — TELEPHONE ENCOUNTER
Returned call patient states hwer and her   is coughing a lot and she is afraid to come today because  she is afraid they will get in a wreak if he catches a coughing attack. She rescheduled to come in on Wednesday. Patient already scheduled to come in on Tuesday.       ----- Message from Katherine Longoria sent at 6/10/2019  9:17 AM CDT -----  Contact: pt   Pt called to reschedule appt 6/10 for labs and infusion   Callback#[phone]  Thank You  EDWIN longoria

## 2019-06-10 NOTE — TELEPHONE ENCOUNTER
----- Message from Jewels Holloway sent at 6/10/2019 10:20 AM CDT -----  Regarding: Day 2 canceled   Returned call patient states her and her   is coughing a lot and she is afraid to come today because  she is afraid they will get in a wreak if he catches a coughing attack. She rescheduled to come in on Wednesday. Patient already scheduled to come in on Tuesday.   Day 2 was today.    Spoke with patient. Patient's  is sick. Patient will do chemo tomorrow.

## 2019-06-11 ENCOUNTER — TELEPHONE (OUTPATIENT)
Dept: HEMATOLOGY/ONCOLOGY | Facility: CLINIC | Age: 79
End: 2019-06-11

## 2019-06-11 ENCOUNTER — INFUSION (OUTPATIENT)
Dept: INFUSION THERAPY | Facility: HOSPITAL | Age: 79
End: 2019-06-11
Attending: INTERNAL MEDICINE
Payer: MEDICARE

## 2019-06-11 VITALS
RESPIRATION RATE: 18 BRPM | OXYGEN SATURATION: 96 % | DIASTOLIC BLOOD PRESSURE: 62 MMHG | TEMPERATURE: 97 F | SYSTOLIC BLOOD PRESSURE: 136 MMHG | HEART RATE: 73 BPM

## 2019-06-11 DIAGNOSIS — C34.92 SMALL CELL LUNG CANCER, LEFT: Primary | ICD-10-CM

## 2019-06-11 PROCEDURE — 25000003 PHARM REV CODE 250: Performed by: INTERNAL MEDICINE

## 2019-06-11 PROCEDURE — 63600175 PHARM REV CODE 636 W HCPCS: Performed by: INTERNAL MEDICINE

## 2019-06-11 PROCEDURE — 96413 CHEMO IV INFUSION 1 HR: CPT

## 2019-06-11 RX ORDER — SODIUM CHLORIDE 0.9 % (FLUSH) 0.9 %
10 SYRINGE (ML) INJECTION
Status: DISCONTINUED | OUTPATIENT
Start: 2019-06-11 | End: 2019-06-11 | Stop reason: HOSPADM

## 2019-06-11 RX ORDER — HEPARIN 100 UNIT/ML
500 SYRINGE INTRAVENOUS
Status: DISCONTINUED | OUTPATIENT
Start: 2019-06-11 | End: 2019-06-11 | Stop reason: HOSPADM

## 2019-06-11 RX ADMIN — ETOPOSIDE 160 MG: 20 INJECTION, SOLUTION, CONCENTRATE INTRAVENOUS at 02:06

## 2019-06-11 RX ADMIN — HEPARIN 500 UNITS: 100 SYRINGE at 03:06

## 2019-06-11 RX ADMIN — SODIUM CHLORIDE: 0.9 INJECTION, SOLUTION INTRAVENOUS at 02:06

## 2019-06-11 NOTE — PLAN OF CARE
Problem: Adult Inpatient Plan of Care  Goal: Plan of Care Review  Outcome: Ongoing (interventions implemented as appropriate)  Pt received Etoposide; tolerated well. Dr. Atkinson notified of pt's labs. Information passed along to Dr. Torres. VSS and NAD. Pt to RTC 6/12. Pt instructed to call MD with any concerns. Pt discharged home independently.

## 2019-06-11 NOTE — TELEPHONE ENCOUNTER
Was notified by nurse she attended chemo today 6/11/19.    Stephan Atkinson MD  Hematology Oncology Fellow PGY5  Pager [phone]      ----- Message from Ana Bailey RN sent at 6/10/2019  9:45 AM CDT -----  Contact: Pt       ----- Message -----  From: Laura López  Sent: 6/10/2019   8:58 AM  To: [facility] Staff    Pt has a bad cough and congested, will not be able to make it in today. Please contact her and advise if she should keep the 6/11 appts. She can be reached at [phone]  Thank you

## 2019-06-12 ENCOUNTER — INFUSION (OUTPATIENT)
Dept: INFUSION THERAPY | Facility: HOSPITAL | Age: 79
End: 2019-06-12
Attending: INTERNAL MEDICINE
Payer: MEDICARE

## 2019-06-12 VITALS
HEART RATE: 75 BPM | DIASTOLIC BLOOD PRESSURE: 61 MMHG | RESPIRATION RATE: 18 BRPM | SYSTOLIC BLOOD PRESSURE: 129 MMHG | TEMPERATURE: 98 F

## 2019-06-12 DIAGNOSIS — C34.92 SMALL CELL LUNG CANCER, LEFT: Primary | ICD-10-CM

## 2019-06-12 PROCEDURE — 25000003 PHARM REV CODE 250: Performed by: INTERNAL MEDICINE

## 2019-06-12 PROCEDURE — A4216 STERILE WATER/SALINE, 10 ML: HCPCS | Performed by: INTERNAL MEDICINE

## 2019-06-12 PROCEDURE — 96413 CHEMO IV INFUSION 1 HR: CPT

## 2019-06-12 PROCEDURE — 63600175 PHARM REV CODE 636 W HCPCS: Performed by: INTERNAL MEDICINE

## 2019-06-12 RX ORDER — HEPARIN 100 UNIT/ML
500 SYRINGE INTRAVENOUS
Status: DISCONTINUED | OUTPATIENT
Start: 2019-06-12 | End: 2019-06-12 | Stop reason: HOSPADM

## 2019-06-12 RX ORDER — SODIUM CHLORIDE 0.9 % (FLUSH) 0.9 %
10 SYRINGE (ML) INJECTION
Status: DISCONTINUED | OUTPATIENT
Start: 2019-06-12 | End: 2019-06-12 | Stop reason: HOSPADM

## 2019-06-12 RX ADMIN — SODIUM CHLORIDE: 0.9 INJECTION, SOLUTION INTRAVENOUS at 02:06

## 2019-06-12 RX ADMIN — Medication 10 ML: at 03:06

## 2019-06-12 RX ADMIN — ETOPOSIDE 160 MG: 20 INJECTION, SOLUTION, CONCENTRATE INTRAVENOUS at 02:06

## 2019-06-12 RX ADMIN — HEPARIN 500 UNITS: 100 SYRINGE at 03:06

## 2019-06-12 NOTE — PLAN OF CARE
Problem: Adult Inpatient Plan of Care  Goal: Plan of Care Review  Outcome: Ongoing (interventions implemented as appropriate)  Patient tolerated D3 etoposide. Patient c/o wheezing. States she is using her inhaler at home. Patient also states she has a prednisone prescription from a MD from [facility]. Explained to patient I didn't see it listed on her medication list. Notified Dr. Zamorano, per md resume prednisone as prescribed and follow up with prescribing MD for any worsening symptoms. Patient and caregiver verbalized understanding to report to ER if patient has any new/worsening symptoms. AVS given. Plan to meet with RD Friday. Discharged home, escorted in wheelchair by family.

## 2019-06-13 NOTE — PROGRESS NOTES
Referral to arrange home health services for patient.  HH orders and patient information forwarded to [facility].  At [facility] agreed to see pt for start of care on Saturday June 8, 2019.

## 2019-06-14 ENCOUNTER — TELEPHONE (OUTPATIENT)
Dept: HEMATOLOGY/ONCOLOGY | Facility: CLINIC | Age: 79
End: 2019-06-14

## 2019-06-14 NOTE — TELEPHONE ENCOUNTER
----- Message from Jewels Holloway sent at 6/14/2019  8:37 AM CDT -----  Regarding: Not feeling well  I returned call to patient she states she is not feeling well she had to     reschedule the appointment with Elvira today. She states she is     coughing she took a breathing treatment and prednisone.    Patient is not feeling well. She has a cold. Her  is sick too. Patient will go to her PCP in [place].

## 2019-06-14 NOTE — TELEPHONE ENCOUNTER
Called patient she rescheduled the appointment with Elvira   Patient states she is not feeling well. Message sent to nurse.      ----- Message from Katherine Dos Santos sent at 6/14/2019  8:11 AM CDT -----  Contact: Pt   Pt called to reschedule appt 6/14  Callback#[phone]  Thank You  EDWIN Dos Santos

## 2019-06-20 ENCOUNTER — CLINICAL SUPPORT (OUTPATIENT)
Dept: HEMATOLOGY/ONCOLOGY | Facility: CLINIC | Age: 79
End: 2019-06-20
Payer: MEDICARE

## 2019-06-20 VITALS — WEIGHT: 117.75 LBS | BODY MASS INDEX: 21.67 KG/M2 | HEIGHT: 62 IN

## 2019-06-20 DIAGNOSIS — C34.90 SMALL CELL LUNG CANCER: ICD-10-CM

## 2019-06-20 DIAGNOSIS — Z71.3 NUTRITIONAL COUNSELING: Primary | ICD-10-CM

## 2019-06-20 PROCEDURE — 99212 OFFICE O/P EST SF 10 MIN: CPT | Mod: PBBFAC | Performed by: DIETITIAN, REGISTERED

## 2019-06-20 PROCEDURE — 97802 MEDICAL NUTRITION INDIV IN: CPT | Mod: PBBFAC | Performed by: DIETITIAN, REGISTERED

## 2019-06-20 PROCEDURE — 99999 PR PBB SHADOW E&M-EST. PATIENT-LVL II: ICD-10-PCS | Mod: PBBFAC,,, | Performed by: DIETITIAN, REGISTERED

## 2019-06-20 PROCEDURE — 99999 PR PBB SHADOW E&M-EST. PATIENT-LVL II: CPT | Mod: PBBFAC,,, | Performed by: DIETITIAN, REGISTERED

## 2019-06-20 NOTE — PROGRESS NOTES
"Medical Nutrition Therapy Oncology Progress Note    Name: Rosita Almodovar MRN: [id number]  : 1940    Age: 79 y.o.  Ethnicity: /White Language: English    Diagnosis: No diagnosis found.    Chemo Regimen: Carboplatin/etoposide   Referring MD: Dr. Atkinson Frequency: Q3W Radiation:            Goal of Cancer treatment n/a         Nutrition Assessment     Chief Complaint:   Chief Complaint   Patient presents with    Nutrition Counseling    Lung Cancer        Anthropometric Measurements:  Height: 5' 1.5" (1.562 m)  Current Weight: 53.4 kg (117 lb 11.6 oz)  Ideal Body Weight: 107.5#  Percent Ideal Body Weight:: 108%  BMI: Body mass index is 21.88 kg/m².     Weight History:   Wt Readings from Last 8 Encounters:   19 53.4 kg (117 lb 11.6 oz)   19 56 kg (123 lb 7.3 oz)   19 54.9 kg (121 lb)   19 54.9 kg (121 lb)   19 54.9 kg (121 lb)   19 54.9 kg (121 lb)   19 55.1 kg (121 lb 7.6 oz)   19 59 kg (130 lb 1.1 oz)     Medical Health History:  Feeding tube placed: No  Pre-treatment: No    Past Surgical History:   Procedure Laterality Date    ANGIOGRAM, CORONARY, WITH LEFT HEART CATHETERIZATION      endoscope      VEOVMATVS-CBJS-L-CATH LEFT NECK Left 2019    Performed by Reggie Franklin Jr., MD at [facility] OR [address] [place] [address]    kidney cyst excision and drainage          Medications:   Current Outpatient Medications:     albuterol (VENTOLIN HFA) 90 mcg/actuation inhaler, Ventolin HFA 90 mcg/actuation aerosol inhaler, Disp: , Rfl:     atenolol (TENORMIN) 50 MG tablet, Take 50 mg by mouth once daily., Disp: , Rfl: 1    budesonide-formoterol 160-4.5 mcg (SYMBICORT) 160-4.5 mcg/actuation HFAA, Inhale 2 puffs into the lungs every 12 (twelve) hours. Controller, Disp: 1 Inhaler, Rfl: 11    clonazePAM (KLONOPIN) 0.5 MG tablet, TAKE ONE TABLET BY MOUTH THREE TIMES DAILY AS NEEDED FOR ANXIETY OR for panic attacks, Disp: , Rfl: 3    DECARA 50,000 unit capsule, Take 50,000 " Units by mouth every 7 days., Disp: , Rfl: 3    ergocalciferol (VITAMIN D2) 50,000 unit Cap, Take 50,000 Units by mouth every 7 days. Tuesday, Disp: , Rfl:     furosemide (LASIX) 20 MG tablet, Take 20 mg by mouth once daily. , Disp: , Rfl:     levothyroxine (SYNTHROID) 50 MCG tablet, Take 50 mcg by mouth every morning., Disp: , Rfl: 3    lidocaine-prilocaine (EMLA) cream, Apply to port site 1 hour prior to chemotherapy and cover with saran wrap, Disp: 30 g, Rfl: 3    lisinopril (PRINIVIL,ZESTRIL) 5 MG tablet, Take 5 mg by mouth once daily. , Disp: , Rfl:     ondansetron (ZOFRAN-ODT) 8 MG TbDL, Dissolve 1 tablet (8 mg total) under tongue every 8 (eight) hours as needed (nausea)., Disp: 30 tablet, Rfl: 3    pantoprazole (PROTONIX) 40 MG tablet, TAKE ONE TABLET BY MOUTH EVERY DAY FOR stomach, Disp: , Rfl: 3    potassium chloride SA (K-DUR,KLOR-CON) 20 MEQ tablet, Take 1 tablet (20 mEq total) by mouth 2 (two) times daily., Disp: 8 tablet, Rfl: 0    pravastatin (PRAVACHOL) 20 MG tablet, Take 20 mg by mouth once daily. , Disp: , Rfl:     sotalol (BETAPACE) 80 MG tablet, TAKE ONE TABLET BY MOUTH TWICE DAILY FOR BLOOD PRESSURE AND HEART, Disp: , Rfl: 1    tiotropium bromide 2.5 mcg/actuation Mist, Inhale 5 mcg into the lungs once daily. Controller, Disp: 4 g, Rfl: 5    traMADol (ULTRAM) 50 mg tablet, Take 50 mg by mouth every 12 (twelve) hours as needed. , Disp: , Rfl: 3    Last Labs:  Last Labs:  Glucose   Date Value Ref Range Status   06/12/2019 87 70 - 110 mg/dL Final   06/11/2019 100 70 - 110 mg/dL Final     BUN, Bld   Date Value Ref Range Status   06/12/2019 11 8 - 23 mg/dL Final   06/11/2019 11 8 - 23 mg/dL Final     Creatinine   Date Value Ref Range Status   06/12/2019 0.7 0.5 - 1.4 mg/dL Final   06/11/2019 0.8 0.5 - 1.4 mg/dL Final     Sodium   Date Value Ref Range Status   06/12/2019 131 (L) 136 - 145 mmol/L Final   06/11/2019 129 (L) 136 - 145 mmol/L Final     Potassium   Date Value Ref Range Status    06/12/2019 4.5 3.5 - 5.1 mmol/L Final   06/11/2019 5.2 (H) 3.5 - 5.1 mmol/L Final     Phosphorus   Date Value Ref Range Status   05/11/2019 3.9 2.7 - 4.5 mg/dL Final   05/10/2019 2.1 (L) 2.7 - 4.5 mg/dL Final     Calcium   Date Value Ref Range Status   06/12/2019 9.5 8.7 - 10.5 mg/dL Final   06/11/2019 9.7 8.7 - 10.5 mg/dL Final     Prealbumin   Date Value Ref Range Status   05/09/2019 16 (L) 20 - 43 mg/dL Final     Total Protein   Date Value Ref Range Status   06/12/2019 5.7 (L) 6.0 - 8.4 g/dL Final   06/11/2019 5.8 (L) 6.0 - 8.4 g/dL Final     Cholesterol   Date Value Ref Range Status   05/08/2019 116 80 - 200 mg/dL Final     Comment:     The National Cholesterol Education Program (NCEP) has set the  following guidelines (reference ranges) for Cholesterol:  Optimal.....................<200 mg/dL  Borderline High.............200-239 mg/dL  High........................> or = 240 mg/dL     11/12/2018 156 80 - 200 mg/dL Final     Comment:     The National Cholesterol Education Program (NCEP) has set the  following guidelines (reference ranges) for Cholesterol:  Optimal.....................<200 mg/dL  Borderline High.............200-239 mg/dL  High........................> or = 240 mg/dL       Hemoglobin A1C   Date Value Ref Range Status   05/10/2019 5.7 (H) 4.0 - 5.6 % Final     Comment:     ADA Screening Guidelines:  5.7-6.4%  Consistent with prediabetes  >or=6.5%  Consistent with diabetes  High levels of fetal hemoglobin interfere with the HbA1C  assay. Heterozygous hemoglobin variants (HbS, HgC, etc)do  not significantly interfere with this assay.   However, presence of multiple variants may affect accuracy.     04/24/2019 5.6 4.0 - 5.6 % Final     Comment:     ADA Screening Guidelines:  5.7-6.4%  Consistent with prediabetes  >or=6.5%  Consistent with diabetes  High levels of fetal hemoglobin interfere with the HbA1C  assay. Heterozygous hemoglobin variants (HbS, HgC, etc)do  not significantly interfere with this  assay.   However, presence of multiple variants may affect accuracy.       Hemoglobin   Date Value Ref Range Status   06/05/2019 11.7 (L) 12.0 - 16.0 g/dL Final   05/29/2019 13.2 12.0 - 16.0 g/dL Final     Hematocrit   Date Value Ref Range Status   06/05/2019 36.0 (L) 37.0 - 48.5 % Final   05/29/2019 40.6 37.0 - 48.5 % Final     No results found for: IRON  No components found for: FROLATE  Vit D, 25-Hydroxy   Date Value Ref Range Status   04/24/2019 61 30 - 96 ng/mL Final     Comment:     Vitamin D deficiency.........<10 ng/mL                              Vitamin D insufficiency......10-29 ng/mL       Vitamin D sufficiency........> or equal to 30 ng/mL  Vitamin D toxicity............>100 ng/mL     11/12/2018 7 (L) 30 - 96 ng/mL Final     Comment:     Vitamin D deficiency.........<10 ng/mL                              Vitamin D insufficiency......10-29 ng/mL       Vitamin D sufficiency........> or equal to 30 ng/mL  Vitamin D toxicity............>100 ng/mL       WBC   Date Value Ref Range Status   06/05/2019 7.90 3.90 - 12.70 K/uL Final   05/29/2019 8.04 3.90 - 12.70 K/uL Final       Client History/Food Access:    Living Situation: Lives with spouse   Who: Shops for Groceries? Spouse   Who : Prepares meals? Spouse   Who: Fills prescriptions? Spouse   Are there financial difficulties purchasing food? No   Personal History (occupation, physical activity level, exercise): In wheelchair for visit; walks with walker at home     Baseline for Outcomes Monitoring  Food and Nutrition History: Pt here with  for nutrition counseling. Current weight of 117# which pt reports is a 50# weight loss in 6 months. Pt reports fair appetite which is an improvement; eating 3-4 small meals/snacks each day. Pt feels her strength is improving as she is now able to get herself out of bed and walk around the house with a walker.  is a good support, but feels he doesn't know what to feed her. Provided pt and  with handouts  on ways to increase calories consumed. Encouraged soft foods as they may be more easily tolerated. Stressed importance of eating frequently throughout the day or at least every 2-3 hours. Encouraged Ensure/Boost Plus as high calorie oral nutrition supplement. Pt feels these taste too sweet, but drinks 4oz 2-3 times/week. Encouraged pt to try adding salt to Ensure to see if that helps cut back on the sweetness. Reviewed potassium foods as pt notes her potassium levels are low. Reviewed medications, supplement/herbal intake and no food/med interactions noted. Lab results noted. Compliance is fair. Comprehension is good.  24-hour recall:  Breakfast: 1 1/2 scrambled eggs  Snack: jello or pudding  Lunch: canned broth soup with crackers  Dinner: ice cream cup    Nutritional Needs:  Estimated Needs Method Use    1600 kcal/day [] Predictive Equation: [place]   [x]  30 kcal/kg   Protein 60 g 1.2 gm/kg/day   Fluid 1600 ml 30 ml/kg/day     Food/Nutrient Intake (oral, enteral or parenteral) and Patient Behaviors     Calorie intake: Inadequate   Protein intake: Inadequate     Yes/No    Yes Uses medical food supplements   Yes Cooking techniques to minimize fatigue   Yes Currently modifying food textures   No Able to maintain usual physical actiivty     Nutrition Diagnosis     Nutrition Diagnosis Related to (Etiology) As Evidenced By (Signs/Symptoms)   Malnutrition Physiological causes 30% weight loss in 6 months; loss of muscle and fat mass                 Nutritional Intervention and Prescription        Nutrition Intervention Texture-modified diet, Energy-modified diet and Protein-modified diet   Goals/Expected Outcomes Pt to choose high calorie, high protein soft foods and beverages to maintain weight throughout treatment.   Progress Initial     Nutrition Intervention Schedule of food/fluids   Goals/Expected Outcomes Pt to eat 5-6 times/day for adequate intake.   Progress Initial     Nutrition Intervention Medical food  supplement: Commercial beverage   Goals/Expected Outcomes Pt to try adding salt to Ensure and drink 1 every day.   Progress Initial     Pt needs education? yes (see intervention)    Coordination of Nutrition Care: Comments:   Collaboration with other providers MD         Monitoring and Evaluation     Monitor: weight    Next Visit: PRN    Materials Provided:  1. Suggestions for increasing calories and protein 2. Ways to add calories and protein   3. Calorie sources            Total time: 45 minutes    Nutrition Score:      ©2010 [employer] Oncology Toolkit

## 2019-06-21 ENCOUNTER — OFFICE VISIT (OUTPATIENT)
Dept: SURGERY | Facility: CLINIC | Age: 79
End: 2019-06-21
Payer: MEDICARE

## 2019-06-21 VITALS
SYSTOLIC BLOOD PRESSURE: 114 MMHG | HEIGHT: 62 IN | HEART RATE: 62 BPM | DIASTOLIC BLOOD PRESSURE: 56 MMHG | BODY MASS INDEX: 21.53 KG/M2 | WEIGHT: 117 LBS

## 2019-06-21 DIAGNOSIS — Z09 POSTOP CHECK: Primary | ICD-10-CM

## 2019-06-21 PROCEDURE — 99213 OFFICE O/P EST LOW 20 MIN: CPT | Mod: PBBFAC | Performed by: SURGERY

## 2019-06-21 PROCEDURE — 99999 PR PBB SHADOW E&M-EST. PATIENT-LVL III: ICD-10-PCS | Mod: PBBFAC,,, | Performed by: SURGERY

## 2019-06-21 PROCEDURE — 99024 PR POST-OP FOLLOW-UP VISIT: ICD-10-PCS | Mod: POP,,, | Performed by: SURGERY

## 2019-06-21 PROCEDURE — 99999 PR PBB SHADOW E&M-EST. PATIENT-LVL III: CPT | Mod: PBBFAC,,, | Performed by: SURGERY

## 2019-06-21 PROCEDURE — 99024 POSTOP FOLLOW-UP VISIT: CPT | Mod: POP,,, | Performed by: SURGERY

## 2019-06-21 RX ORDER — CLOPIDOGREL BISULFATE 75 MG/1
75 TABLET ORAL DAILY
Refills: 3 | COMMUNITY
Start: 2019-06-11 | End: 2019-07-31

## 2019-06-21 RX ORDER — PREDNISONE 20 MG/1
TABLET ORAL
Refills: 2 | COMMUNITY
Start: 2019-06-12 | End: 2019-09-16

## 2019-06-21 NOTE — PROGRESS NOTES
HPI:  The patient is status post-port placement.  Doing well.  Port being used without issue.      PHYSICAL EXAM:  Physical Exam     In NAD  Incision c/d/i    ASSESSMENT:    The patient is doing well after surgery.     PLAN:    Follow up PRN.  Activity: regular duty        Reggie Franklin MD

## 2019-07-03 ENCOUNTER — OFFICE VISIT (OUTPATIENT)
Dept: HEMATOLOGY/ONCOLOGY | Facility: CLINIC | Age: 79
End: 2019-07-03
Payer: MEDICARE

## 2019-07-03 ENCOUNTER — INFUSION (OUTPATIENT)
Dept: INFUSION THERAPY | Facility: HOSPITAL | Age: 79
End: 2019-07-03
Payer: MEDICARE

## 2019-07-03 VITALS
OXYGEN SATURATION: 98 % | RESPIRATION RATE: 18 BRPM | TEMPERATURE: 98 F | HEART RATE: 76 BPM | SYSTOLIC BLOOD PRESSURE: 109 MMHG | DIASTOLIC BLOOD PRESSURE: 53 MMHG

## 2019-07-03 VITALS
HEIGHT: 62 IN | HEART RATE: 64 BPM | OXYGEN SATURATION: 96 % | WEIGHT: 112.88 LBS | RESPIRATION RATE: 16 BRPM | SYSTOLIC BLOOD PRESSURE: 91 MMHG | DIASTOLIC BLOOD PRESSURE: 52 MMHG | TEMPERATURE: 98 F | BODY MASS INDEX: 20.77 KG/M2

## 2019-07-03 DIAGNOSIS — E87.6 HYPOKALEMIA DUE TO INADEQUATE POTASSIUM INTAKE: ICD-10-CM

## 2019-07-03 DIAGNOSIS — C34.92 SMALL CELL LUNG CANCER, LEFT: Primary | ICD-10-CM

## 2019-07-03 DIAGNOSIS — G93.89 CEREBELLAR MASS: ICD-10-CM

## 2019-07-03 DIAGNOSIS — E83.42 HYPOMAGNESEMIA: ICD-10-CM

## 2019-07-03 DIAGNOSIS — T45.1X5A ANTINEOPLASTIC CHEMOTHERAPY INDUCED ANEMIA: ICD-10-CM

## 2019-07-03 DIAGNOSIS — C34.90 SMALL CELL LUNG CANCER: Primary | ICD-10-CM

## 2019-07-03 DIAGNOSIS — D64.81 ANTINEOPLASTIC CHEMOTHERAPY INDUCED ANEMIA: ICD-10-CM

## 2019-07-03 PROCEDURE — 63600175 PHARM REV CODE 636 W HCPCS: Performed by: INTERNAL MEDICINE

## 2019-07-03 PROCEDURE — 99999 PR PBB SHADOW E&M-EST. PATIENT-LVL V: ICD-10-PCS | Mod: PBBFAC,GC,, | Performed by: STUDENT IN AN ORGANIZED HEALTH CARE EDUCATION/TRAINING PROGRAM

## 2019-07-03 PROCEDURE — 96367 TX/PROPH/DG ADDL SEQ IV INF: CPT

## 2019-07-03 PROCEDURE — 96413 CHEMO IV INFUSION 1 HR: CPT

## 2019-07-03 PROCEDURE — 99215 PR OFFICE/OUTPT VISIT, EST, LEVL V, 40-54 MIN: ICD-10-PCS | Mod: S$PBB,GC,, | Performed by: STUDENT IN AN ORGANIZED HEALTH CARE EDUCATION/TRAINING PROGRAM

## 2019-07-03 PROCEDURE — 25000003 PHARM REV CODE 250: Performed by: INTERNAL MEDICINE

## 2019-07-03 PROCEDURE — 96417 CHEMO IV INFUS EACH ADDL SEQ: CPT

## 2019-07-03 PROCEDURE — 96361 HYDRATE IV INFUSION ADD-ON: CPT

## 2019-07-03 PROCEDURE — 99999 PR PBB SHADOW E&M-EST. PATIENT-LVL V: CPT | Mod: PBBFAC,GC,, | Performed by: STUDENT IN AN ORGANIZED HEALTH CARE EDUCATION/TRAINING PROGRAM

## 2019-07-03 PROCEDURE — 99215 OFFICE O/P EST HI 40 MIN: CPT | Mod: PBBFAC,25 | Performed by: STUDENT IN AN ORGANIZED HEALTH CARE EDUCATION/TRAINING PROGRAM

## 2019-07-03 PROCEDURE — 99215 OFFICE O/P EST HI 40 MIN: CPT | Mod: S$PBB,GC,, | Performed by: STUDENT IN AN ORGANIZED HEALTH CARE EDUCATION/TRAINING PROGRAM

## 2019-07-03 RX ORDER — LOPERAMIDE HYDROCHLORIDE 2 MG/1
2 CAPSULE ORAL 4 TIMES DAILY PRN
Qty: 30 CAPSULE | Refills: 1 | Status: SHIPPED | OUTPATIENT
Start: 2019-07-03 | End: 2019-07-13

## 2019-07-03 RX ORDER — SODIUM CHLORIDE 0.9 % (FLUSH) 0.9 %
10 SYRINGE (ML) INJECTION
Status: CANCELLED | OUTPATIENT
Start: 2019-07-03

## 2019-07-03 RX ORDER — POTASSIUM CHLORIDE 20 MEQ/1
20 TABLET, EXTENDED RELEASE ORAL DAILY
Qty: 7 TABLET | Refills: 2 | Status: SHIPPED | OUTPATIENT
Start: 2019-07-03 | End: 2019-07-10

## 2019-07-03 RX ORDER — SODIUM CHLORIDE AND POTASSIUM CHLORIDE 150; 900 MG/100ML; MG/100ML
INJECTION, SOLUTION INTRAVENOUS CONTINUOUS
Status: CANCELLED
Start: 2019-07-03

## 2019-07-03 RX ORDER — HEPARIN 100 UNIT/ML
500 SYRINGE INTRAVENOUS
Status: CANCELLED | OUTPATIENT
Start: 2019-07-05

## 2019-07-03 RX ORDER — HEPARIN 100 UNIT/ML
500 SYRINGE INTRAVENOUS
Status: CANCELLED | OUTPATIENT
Start: 2019-07-03

## 2019-07-03 RX ORDER — SODIUM CHLORIDE 0.9 % (FLUSH) 0.9 %
10 SYRINGE (ML) INJECTION
Status: CANCELLED | OUTPATIENT
Start: 2019-07-06

## 2019-07-03 RX ORDER — SODIUM CHLORIDE AND POTASSIUM CHLORIDE 150; 900 MG/100ML; MG/100ML
INJECTION, SOLUTION INTRAVENOUS CONTINUOUS
Status: DISPENSED | OUTPATIENT
Start: 2019-07-03 | End: 2019-07-03

## 2019-07-03 RX ORDER — POTASSIUM CHLORIDE 20 MEQ/1
40 TABLET, EXTENDED RELEASE ORAL
Status: COMPLETED | OUTPATIENT
Start: 2019-07-03 | End: 2019-07-03

## 2019-07-03 RX ORDER — POTASSIUM CHLORIDE 750 MG/1
40 TABLET, EXTENDED RELEASE ORAL
Status: CANCELLED
Start: 2019-07-06

## 2019-07-03 RX ORDER — SODIUM CHLORIDE 0.9 % (FLUSH) 0.9 %
10 SYRINGE (ML) INJECTION
Status: DISCONTINUED | OUTPATIENT
Start: 2019-07-03 | End: 2019-07-03 | Stop reason: HOSPADM

## 2019-07-03 RX ORDER — HEPARIN 100 UNIT/ML
500 SYRINGE INTRAVENOUS
Status: CANCELLED | OUTPATIENT
Start: 2019-07-06

## 2019-07-03 RX ORDER — POTASSIUM CHLORIDE 750 MG/1
40 TABLET, EXTENDED RELEASE ORAL
Status: CANCELLED
Start: 2019-07-03

## 2019-07-03 RX ORDER — SODIUM CHLORIDE 0.9 % (FLUSH) 0.9 %
10 SYRINGE (ML) INJECTION
Status: CANCELLED | OUTPATIENT
Start: 2019-07-05

## 2019-07-03 RX ORDER — SODIUM CHLORIDE AND POTASSIUM CHLORIDE 150; 900 MG/100ML; MG/100ML
INJECTION, SOLUTION INTRAVENOUS CONTINUOUS
Status: CANCELLED
Start: 2019-07-06

## 2019-07-03 RX ORDER — POTASSIUM CHLORIDE 750 MG/1
40 TABLET, EXTENDED RELEASE ORAL
Status: CANCELLED
Start: 2019-07-05

## 2019-07-03 RX ORDER — CYPROHEPTADINE HYDROCHLORIDE 4 MG/1
4 TABLET ORAL 4 TIMES DAILY
Qty: 90 TABLET | Refills: 2 | Status: ON HOLD | OUTPATIENT
Start: 2019-07-03 | End: 2019-11-04 | Stop reason: HOSPADM

## 2019-07-03 RX ORDER — HEPARIN 100 UNIT/ML
500 SYRINGE INTRAVENOUS
Status: DISCONTINUED | OUTPATIENT
Start: 2019-07-03 | End: 2019-07-03 | Stop reason: HOSPADM

## 2019-07-03 RX ORDER — SODIUM CHLORIDE AND POTASSIUM CHLORIDE 150; 900 MG/100ML; MG/100ML
INJECTION, SOLUTION INTRAVENOUS CONTINUOUS
Status: CANCELLED
Start: 2019-07-05

## 2019-07-03 RX ADMIN — APREPITANT 130 MG: 130 INJECTION, EMULSION INTRAVENOUS at 11:07

## 2019-07-03 RX ADMIN — ATEZOLIZUMAB 1200 MG: 1200 INJECTION, SOLUTION INTRAVENOUS at 11:07

## 2019-07-03 RX ADMIN — POTASSIUM CHLORIDE 40 MEQ: 1500 TABLET, EXTENDED RELEASE ORAL at 10:07

## 2019-07-03 RX ADMIN — CARBOPLATIN 245 MG: 10 INJECTION, SOLUTION INTRAVENOUS at 12:07

## 2019-07-03 RX ADMIN — SODIUM CHLORIDE: 0.9 INJECTION, SOLUTION INTRAVENOUS at 11:07

## 2019-07-03 RX ADMIN — MAGNESIUM SULFATE HEPTAHYDRATE 1 G: 500 INJECTION, SOLUTION INTRAMUSCULAR; INTRAVENOUS at 12:07

## 2019-07-03 RX ADMIN — ETOPOSIDE 150 MG: 20 INJECTION INTRAVENOUS at 01:07

## 2019-07-03 RX ADMIN — PALONOSETRON HYDROCHLORIDE: 0.25 INJECTION, SOLUTION INTRAVENOUS at 11:07

## 2019-07-03 RX ADMIN — POTASSIUM CHLORIDE AND SODIUM CHLORIDE: 900; 150 INJECTION, SOLUTION INTRAVENOUS at 10:07

## 2019-07-03 RX ADMIN — HEPARIN 500 UNITS: 100 SYRINGE at 03:07

## 2019-07-03 NOTE — PLAN OF CARE
Problem: Adult Inpatient Plan of Care  Goal: Plan of Care Review  Outcome: Ongoing (interventions implemented as appropriate)  Pt received tecentriq, carbo, and etoposide;tolerated well. Pt received IVF with K and 1mg IV mag. VSS and NAD. Pt instructed to call MD with any concerns. Pt discharged home independently.

## 2019-07-03 NOTE — PROGRESS NOTES
PATIENT: Rosita Almodovar  MRN: [id number]  DATE: 7/3/2019      Diagnosis:   1. Small cell lung cancer, left    2. Cerebellar mass    3. Hypomagnesemia    4. 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  She was started on carboplatin, etoposide, and atezolizumab.  No focal neurologic deficits and thus WBRT was initially deferred.   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  See comment.  Comment:  The left lung biopsy shows a malignant proliferation of cells with increased nuclear cytoplasmic ratio, stippled chromatin pattern with small amount of cytoplasm. Some of cells show molding and crush artifact. The majority of the cells are relatively small in size, however there are a few areas with larger cells within increased amount of cytoplasm. The cells are arranged in sheets and nests with brisk mitotic activity and areas of necrosis. Immunohistochemical stains reveal the tumor cells to stain positively with cytokeratin AE1/AE3, synaptophysin, chromogranin and TTF-1. Immunohistochemical stain for p63 is negative within the tumor cells. Immunostain for cytokeratin 7 shows positive staining in the background lung but is negative within tumor cells. All stains have atisfactory positive negative controls. 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 Correlate clinically.        Subjective:    Interval History: Ms. Almodovar is here for extensive stage small cell lung cancer prior to cycle 2    Adverse effects of cycle 1:  Self-limited watery diarrhea (around 4-5 BMs) for a few days.  Denies fevers, chills, chest pain, worsening shortness of breath, abdominal pain, nausea, vomiting, or constipation.  Has right sided headache (history of fall and hit head on that side) that is chronic.  Denies worsening vision or hearing changes.  Denies new focal unilateral weakness.    She reports improved strength (able to stand from seated position without assistance) and appetite since starting chemotherapy.   says while she has improved she could still use assistance with increasing oral intake.    Her  accompanies her at this visit.     Past Medical History:   Past Medical History:   Diagnosis Date    Acquired hypothyroidism 5/8/2019    Brain tumor     COPD (chronic obstructive pulmonary disease) 5/8/2019    Gastric ulcer     Hilar mass     Kidney cysts     left    Pleural effusion        Past Surgical HIstory:   Past Surgical History:   Procedure Laterality Date    ANGIOGRAM, CORONARY, WITH LEFT HEART CATHETERIZATION      endoscope      [id number]-TUSW-I-CATH LEFT NECK Left 6/4/2019    Performed by Reggie Franklin Jr., MD at [facility] OR [address] [place] [address]    kidney cyst excision and drainage         Family History: History reviewed. No pertinent family history.    Social History:  reports that she quit smoking about 5 years ago. Her smoking use included cigarettes. She has a 54.00 pack-year smoking history. She has never used smokeless tobacco. She reports that she does not drink alcohol or use drugs.    Allergies:  Review of patient's allergies indicates:   Allergen Reactions    Potassium Nausea Only       Medications:  Current Outpatient Medications    Medication Sig Dispense Refill    albuterol (VENTOLIN HFA) 90 mcg/actuation inhaler Ventolin HFA 90 mcg/actuation aerosol inhaler      atenolol (TENORMIN) 50 MG tablet Take 50 mg by mouth once daily.  1    budesonide-formoterol 160-4.5 mcg (SYMBICORT) 160-4.5 mcg/actuation HFAA Inhale 2 puffs into the lungs every 12 (twelve) hours. Controller 1 Inhaler 11    clonazePAM (KLONOPIN) 0.5 MG tablet TAKE ONE TABLET BY MOUTH THREE TIMES DAILY AS NEEDED FOR ANXIETY OR for panic attacks  3    clopidogrel (PLAVIX) 75 mg tablet Take 75 mg by mouth once daily.  3    DECARA 50,000 unit capsule Take 50,000 Units by mouth every 7 days.  3    ergocalciferol (VITAMIN D2) 50,000 unit Cap Take 50,000 Units by mouth every 7 days. Tuesday      furosemide (LASIX) 20 MG tablet Take 20 mg by mouth once daily.       levothyroxine (SYNTHROID) 50 MCG tablet Take 50 mcg by mouth every morning.  3    lidocaine-prilocaine (EMLA) cream Apply to port site 1 hour prior to chemotherapy and cover with saran wrap 30 g 3    lisinopril (PRINIVIL,ZESTRIL) 5 MG tablet Take 5 mg by mouth once daily.       ondansetron (ZOFRAN-ODT) 8 MG TbDL Dissolve 1 tablet (8 mg total) under tongue every 8 (eight) hours as needed (nausea). 30 tablet 3    pantoprazole (PROTONIX) 40 MG tablet TAKE ONE TABLET BY MOUTH EVERY DAY FOR stomach  3    potassium chloride SA (K-DUR,KLOR-CON) 20 MEQ tablet Take 1 tablet (20 mEq total) by mouth 2 (two) times daily. 8 tablet 0    pravastatin (PRAVACHOL) 20 MG tablet Take 20 mg by mouth once daily.       predniSONE (DELTASONE) 20 MG tablet TAKE ONE TABLET BY MOUTH EVERY DAY FOR 3 DAYS AND REPEAT FOR FLARE OF LUNGS AS DIRECTED  2    sotalol (BETAPACE) 80 MG tablet TAKE ONE TABLET BY MOUTH TWICE DAILY FOR BLOOD PRESSURE AND HEART  1    tiotropium bromide 2.5 mcg/actuation Mist Inhale 5 mcg into the lungs once daily. Controller 4 g 5    traMADol (ULTRAM) 50 mg tablet Take 50 mg by mouth every 12 (twelve) hours as  "needed.   3     No current facility-administered medications for this visit.        Review of Systems   Constitutional: Positive for activity change, appetite change, fatigue and unexpected weight change. Negative for chills and fever.   HENT: Negative for nosebleeds.    Eyes: Negative for visual disturbance.   Respiratory: Positive for shortness of breath. Negative for cough.    Cardiovascular: Negative for chest pain and leg swelling.   Gastrointestinal: Negative for abdominal distention, abdominal pain, constipation, diarrhea and nausea.   Endocrine: Negative for cold intolerance and heat intolerance.   Genitourinary: Negative for dysuria.   Musculoskeletal: Positive for back pain (chronic unchanged).   Skin: Negative for color change and rash.   Neurological: Positive for dizziness, weakness, light-headedness and numbness.   Hematological: Negative for adenopathy.   Psychiatric/Behavioral: Negative for confusion.       ECOG Performance Status:  3  Objective:      Vitals:   Vitals:    07/03/19 0917   BP: (!) 91/52   BP Location: Right arm   Patient Position: Sitting   BP Method: Large (Automatic)   Pulse: 64   Resp: 16   Temp: 98 °F (36.7 °C)   TempSrc: Oral   SpO2: 96%   Weight: 51.2 kg (112 lb 14 oz)   Height: 5' 1.5" (1.562 m)     BMI: Body mass index is 20.98 kg/m².   Wt Readings from Last 10 Encounters:   06/21/19 53.1 kg (117 lb)   06/20/19 53.4 kg (117 lb 11.6 oz)   06/05/19 56 kg (123 lb 7.3 oz)   06/04/19 54.9 kg (121 lb)   05/30/19 54.9 kg (121 lb)   05/29/19 54.9 kg (121 lb)   05/29/19 54.9 kg (121 lb)   05/29/19 55.1 kg (121 lb 7.6 oz)   05/14/19 59 kg (130 lb 1.1 oz)   05/10/19 59 kg (130 lb)         Physical Exam   Constitutional: She appears well-developed.   HENT:   Head: Normocephalic.   Eyes: Pupils are equal, round, and reactive to light. EOM are normal. No scleral icterus.   Neck: Normal range of motion. No tracheal deviation present.   Cardiovascular: Normal rate, regular rhythm and normal " heart sounds. Exam reveals no gallop and no friction rub.   No murmur heard.  Pulmonary/Chest: Effort normal. No respiratory distress. She has no wheezes. She has no rales.   Diminished breath sounds left worse than right.   Abdominal: Soft. Bowel sounds are normal. She exhibits no distension and no mass. There is no tenderness. There is no guarding.   Musculoskeletal: Normal range of motion. She exhibits no edema.   Lymphadenopathy:     She has no cervical adenopathy.   Neurological: She is alert.   Skin: Skin is warm and dry.       Laboratory Data:   Recent Results (from the past 24 hour(s))   Comprehensive metabolic panel    Collection Time: 07/03/19  8:14 AM   Result Value Ref Range    Sodium 128 (L) 136 - 145 mmol/L    Potassium 2.9 (L) 3.5 - 5.1 mmol/L    Chloride 86 (L) 95 - 110 mmol/L    CO2 30 (H) 23 - 29 mmol/L    Glucose 96 70 - 110 mg/dL    BUN, Bld 14 8 - 23 mg/dL    Creatinine 1.6 (H) 0.5 - 1.4 mg/dL    Calcium 8.8 8.7 - 10.5 mg/dL    Total Protein 5.6 (L) 6.0 - 8.4 g/dL    Albumin 2.8 (L) 3.5 - 5.2 g/dL    Total Bilirubin 0.4 0.1 - 1.0 mg/dL    Alkaline Phosphatase 220 (H) 55 - 135 U/L    AST 12 10 - 40 U/L    ALT <5 (L) 10 - 44 U/L    Anion Gap 12 8 - 16 mmol/L    eGFR if African American 35.6 (A) >60 mL/min/1.73 m^2    eGFR if non African American 30.9 (A) >60 mL/min/1.73 m^2   CBC auto differential    Collection Time: 07/03/19  8:14 AM   Result Value Ref Range    WBC 5.90 3.90 - 12.70 K/uL    RBC 3.02 (L) 4.00 - 5.40 M/uL    Hemoglobin 8.4 (L) 12.0 - 16.0 g/dL    Hematocrit 25.4 (L) 37.0 - 48.5 %    Mean Corpuscular Volume 84 82 - 98 fL    Mean Corpuscular Hemoglobin 27.8 27.0 - 31.0 pg    Mean Corpuscular Hemoglobin Conc 33.1 32.0 - 36.0 g/dL    RDW 13.6 11.5 - 14.5 %    Platelets 369 (H) 150 - 350 K/uL    MPV 8.5 (L) 9.2 - 12.9 fL    Immature Granulocytes 3.6 (H) 0.0 - 0.5 %    Gran # (ANC) 3.1 1.8 - 7.7 K/uL    Immature Grans (Abs) 0.21 (H) 0.00 - 0.04 K/uL    Lymph # 1.9 1.0 - 4.8 K/uL    Mono  # 0.6 0.3 - 1.0 K/uL    Eos # 0.0 0.0 - 0.5 K/uL    Baso # 0.03 0.00 - 0.20 K/uL    nRBC 0 0 /100 WBC    Gran% 52.5 38.0 - 73.0 %    Lymph% 32.7 18.0 - 48.0 %    Mono% 10.7 4.0 - 15.0 %    Eosinophil% 0.0 0.0 - 8.0 %    Basophil% 0.5 0.0 - 1.9 %    Differential Method Automated    Magnesium    Collection Time: 07/03/19  8:14 AM   Result Value Ref Range    Magnesium 1.4 (L) 1.6 - 2.6 mg/dL           Imaging:         Assessment:       1. Small cell lung cancer, left    2. Cerebellar mass    3. Hypomagnesemia    4. Hypokalemia due to inadequate potassium intake           Plan:       1. 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  While treating small cell lung cancer is the priority, typical approach to purely large cell neuroendocrine is similar to that of NSCLC.  Small cell lung cancer treatment overlaps with that of NSCLC with platinum and immunotherapy components.  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  Was started on carboplatin, etoposide and atezolizumab.  -Plan for carboplatin, etoposide, and atezolizumab.   --Proceed with cycle 2 as she has had clinical improvement  --Will have her get IVF, potassium, and magnesium today in addition to chemotherapy.  Hyponatremia, hypokalemia, and hypomagnesemia with SBP 90s consistent with dehydration and lack of sufficient intake.  --Will have repeat labs 7/5/19 and 7/8/19  --Imodium PRN for chemo induced diarrhea  --Prescribed potassium daily x 7 days with follow up labs next week  -Continue with HH/PT at home  -s/p port placement 6/4/19  -Refer to radiation oncology for WBRT with her worsening hyponatremia.  --Dr. Corbett planned for her to be part of a clinical trial  [id number] plus whole brain radiation.  Concomitant chemotherapy is an exclusion criteria.  -Return to clinic 7/24/19 prior to cycle 3  --Monitor TSH and HbA1c q4 cycles    No orders of the defined types were placed in this encounter.        Stephan Atkinson,  MD  Hematology Oncology Fellow PGY6  Pager [phone]  Distress Screening Results: Psychosocial Distress screening score of Distress Score: 7 noted and reviewed. No intervention indicated.     STAFF NOTE:  I have personally taken the history and examined this patient and agree with Dr. Atkinson's Note as stated above.

## 2019-07-03 NOTE — Clinical Note
1) 7/5/19 labs cmp, mg, cbc, T&S upstairs at chemo prior to C2D2  2) 7/8/19 CMP, CBC, Mg labs only  3) 7/24/19 labs at chemo floor cmp, cbc, mg, follow up with me, then chemo cycle 3 4) 7/25 Chemo C3D2  5) 7/26 chemo C3D3  Thanks -e

## 2019-07-05 ENCOUNTER — TELEPHONE (OUTPATIENT)
Dept: RADIATION ONCOLOGY | Facility: CLINIC | Age: 79
End: 2019-07-05

## 2019-07-05 ENCOUNTER — INFUSION (OUTPATIENT)
Dept: INFUSION THERAPY | Facility: HOSPITAL | Age: 79
End: 2019-07-05
Payer: MEDICARE

## 2019-07-05 VITALS
HEART RATE: 65 BPM | DIASTOLIC BLOOD PRESSURE: 56 MMHG | SYSTOLIC BLOOD PRESSURE: 111 MMHG | RESPIRATION RATE: 18 BRPM | TEMPERATURE: 98 F

## 2019-07-05 DIAGNOSIS — C34.92 SMALL CELL LUNG CANCER, LEFT: Primary | ICD-10-CM

## 2019-07-05 DIAGNOSIS — T82.9XXA VASCULAR PORT COMPLICATION: ICD-10-CM

## 2019-07-05 DIAGNOSIS — C34.90 SMALL CELL LUNG CANCER: Primary | ICD-10-CM

## 2019-07-05 DIAGNOSIS — Z45.2 ENCOUNTER FOR ADJUSTMENT OR MANAGEMENT OF VASCULAR ACCESS DEVICE: ICD-10-CM

## 2019-07-05 DIAGNOSIS — C34.92 SMALL CELL LUNG CANCER, LEFT: ICD-10-CM

## 2019-07-05 LAB
ALBUMIN SERPL BCP-MCNC: 3.1 G/DL (ref 3.5–5.2)
ALP SERPL-CCNC: 220 U/L (ref 55–135)
ALT SERPL W/O P-5'-P-CCNC: 6 U/L (ref 10–44)
ANION GAP SERPL CALC-SCNC: 9 MMOL/L (ref 8–16)
AST SERPL-CCNC: 16 U/L (ref 10–40)
BASOPHILS # BLD AUTO: 0.01 K/UL (ref 0–0.2)
BASOPHILS NFR BLD: 0.1 % (ref 0–1.9)
BILIRUB SERPL-MCNC: 0.4 MG/DL (ref 0.1–1)
BUN SERPL-MCNC: 16 MG/DL (ref 8–23)
CALCIUM SERPL-MCNC: 9.3 MG/DL (ref 8.7–10.5)
CHLORIDE SERPL-SCNC: 100 MMOL/L (ref 95–110)
CO2 SERPL-SCNC: 26 MMOL/L (ref 23–29)
CREAT SERPL-MCNC: 1.1 MG/DL (ref 0.5–1.4)
DIFFERENTIAL METHOD: ABNORMAL
EOSINOPHIL # BLD AUTO: 0 K/UL (ref 0–0.5)
EOSINOPHIL NFR BLD: 0 % (ref 0–8)
ERYTHROCYTE [DISTWIDTH] IN BLOOD BY AUTOMATED COUNT: 14.9 % (ref 11.5–14.5)
EST. GFR  (AFRICAN AMERICAN): 56 ML/MIN/1.73 M^2
EST. GFR  (NON AFRICAN AMERICAN): 48.5 ML/MIN/1.73 M^2
GLUCOSE SERPL-MCNC: 76 MG/DL (ref 70–110)
HCT VFR BLD AUTO: 24.5 % (ref 37–48.5)
HGB BLD-MCNC: 7.7 G/DL (ref 12–16)
IMM GRANULOCYTES # BLD AUTO: 0.06 K/UL (ref 0–0.04)
IMM GRANULOCYTES NFR BLD AUTO: 0.8 % (ref 0–0.5)
LYMPHOCYTES # BLD AUTO: 2 K/UL (ref 1–4.8)
LYMPHOCYTES NFR BLD: 27 % (ref 18–48)
MAGNESIUM SERPL-MCNC: 1.5 MG/DL (ref 1.6–2.6)
MCH RBC QN AUTO: 27.5 PG (ref 27–31)
MCHC RBC AUTO-ENTMCNC: 31.4 G/DL (ref 32–36)
MCV RBC AUTO: 88 FL (ref 82–98)
MONOCYTES # BLD AUTO: 0.3 K/UL (ref 0.3–1)
MONOCYTES NFR BLD: 4.1 % (ref 4–15)
NEUTROPHILS # BLD AUTO: 5.1 K/UL (ref 1.8–7.7)
NEUTROPHILS NFR BLD: 68 % (ref 38–73)
NRBC BLD-RTO: 0 /100 WBC
PLATELET # BLD AUTO: 383 K/UL (ref 150–350)
PMV BLD AUTO: 8.5 FL (ref 9.2–12.9)
POTASSIUM SERPL-SCNC: 4.2 MMOL/L (ref 3.5–5.1)
PROT SERPL-MCNC: 6.1 G/DL (ref 6–8.4)
RBC # BLD AUTO: 2.8 M/UL (ref 4–5.4)
SODIUM SERPL-SCNC: 135 MMOL/L (ref 136–145)
WBC # BLD AUTO: 7.53 K/UL (ref 3.9–12.7)

## 2019-07-05 PROCEDURE — 63600175 PHARM REV CODE 636 W HCPCS: Performed by: INTERNAL MEDICINE

## 2019-07-05 PROCEDURE — 96413 CHEMO IV INFUSION 1 HR: CPT

## 2019-07-05 PROCEDURE — 85025 COMPLETE CBC W/AUTO DIFF WBC: CPT

## 2019-07-05 PROCEDURE — 96367 TX/PROPH/DG ADDL SEQ IV INF: CPT

## 2019-07-05 PROCEDURE — 25000003 PHARM REV CODE 250: Performed by: INTERNAL MEDICINE

## 2019-07-05 PROCEDURE — A4216 STERILE WATER/SALINE, 10 ML: HCPCS | Performed by: INTERNAL MEDICINE

## 2019-07-05 PROCEDURE — 63600175 PHARM REV CODE 636 W HCPCS: Mod: JG | Performed by: STUDENT IN AN ORGANIZED HEALTH CARE EDUCATION/TRAINING PROGRAM

## 2019-07-05 PROCEDURE — 80053 COMPREHEN METABOLIC PANEL: CPT

## 2019-07-05 PROCEDURE — 83735 ASSAY OF MAGNESIUM: CPT

## 2019-07-05 RX ORDER — HEPARIN 100 UNIT/ML
500 SYRINGE INTRAVENOUS
Status: DISCONTINUED | OUTPATIENT
Start: 2019-07-05 | End: 2019-07-08 | Stop reason: HOSPADM

## 2019-07-05 RX ORDER — HEPARIN 100 UNIT/ML
500 SYRINGE INTRAVENOUS
Status: CANCELLED | OUTPATIENT
Start: 2019-07-05

## 2019-07-05 RX ORDER — HEPARIN 100 UNIT/ML
500 SYRINGE INTRAVENOUS
Status: COMPLETED | OUTPATIENT
Start: 2019-07-05 | End: 2019-07-05

## 2019-07-05 RX ORDER — SODIUM CHLORIDE 0.9 % (FLUSH) 0.9 %
10 SYRINGE (ML) INJECTION
Status: DISCONTINUED | OUTPATIENT
Start: 2019-07-05 | End: 2019-07-08 | Stop reason: HOSPADM

## 2019-07-05 RX ORDER — SODIUM CHLORIDE 0.9 % (FLUSH) 0.9 %
10 SYRINGE (ML) INJECTION
Status: CANCELLED | OUTPATIENT
Start: 2019-07-05

## 2019-07-05 RX ORDER — SODIUM CHLORIDE 0.9 % (FLUSH) 0.9 %
10 SYRINGE (ML) INJECTION
Status: COMPLETED | OUTPATIENT
Start: 2019-07-05 | End: 2019-07-05

## 2019-07-05 RX ADMIN — ETOPOSIDE 150 MG: 20 INJECTION INTRAVENOUS at 04:07

## 2019-07-05 RX ADMIN — Medication 10 ML: at 01:07

## 2019-07-05 RX ADMIN — HEPARIN 500 UNITS: 100 SYRINGE at 01:07

## 2019-07-05 RX ADMIN — MAGNESIUM SULFATE 1 G: 500 INJECTION, SOLUTION INTRAMUSCULAR; INTRAVENOUS at 03:07

## 2019-07-05 RX ADMIN — HEPARIN 500 UNITS: 100 SYRINGE at 05:07

## 2019-07-05 RX ADMIN — ALTEPLASE 2 MG: 2.2 INJECTION, POWDER, LYOPHILIZED, FOR SOLUTION INTRAVENOUS at 02:07

## 2019-07-05 RX ADMIN — SODIUM CHLORIDE: 0.9 INJECTION, SOLUTION INTRAVENOUS at 03:07

## 2019-07-05 NOTE — PLAN OF CARE
Problem: Nausea and Vomiting (Chemotherapy Effects)  Goal: Fluid and Electrolyte Balance  Outcome: Ongoing (interventions implemented as appropriate)  Patient here for VP16.  Assessment completed and labs reviewed.  VSS.  Per Dr. Adán patricio to proceed with chemo with hgb 7.7, give Mg+ (1.5), and hold both po and IV K+ of 4.2.  No needs at this time.  Chair reclined and blanket offered.  Will continue to monitor.

## 2019-07-05 NOTE — NURSING
1320  Pt here for lab draw from PAC, no new complaints or concerns; unable to draw from PAC, peripheral stick to left AC, tolerated well; Cathflo instilled in PAC at 1408, discussed all with pt; pt taken to lobby and checked-in for infusion; discussed all with RODRIGO Dalton RN

## 2019-07-06 ENCOUNTER — INFUSION (OUTPATIENT)
Dept: INFUSION THERAPY | Facility: HOSPITAL | Age: 79
End: 2019-07-06
Payer: MEDICARE

## 2019-07-06 VITALS
DIASTOLIC BLOOD PRESSURE: 52 MMHG | RESPIRATION RATE: 20 BRPM | SYSTOLIC BLOOD PRESSURE: 104 MMHG | HEART RATE: 60 BPM | TEMPERATURE: 99 F

## 2019-07-06 DIAGNOSIS — C34.92 SMALL CELL LUNG CANCER, LEFT: Primary | ICD-10-CM

## 2019-07-06 PROCEDURE — 96367 TX/PROPH/DG ADDL SEQ IV INF: CPT

## 2019-07-06 PROCEDURE — 25000003 PHARM REV CODE 250: Performed by: INTERNAL MEDICINE

## 2019-07-06 PROCEDURE — 63600175 PHARM REV CODE 636 W HCPCS: Performed by: INTERNAL MEDICINE

## 2019-07-06 PROCEDURE — 96413 CHEMO IV INFUSION 1 HR: CPT

## 2019-07-06 RX ORDER — HEPARIN 100 UNIT/ML
500 SYRINGE INTRAVENOUS
Status: DISCONTINUED | OUTPATIENT
Start: 2019-07-06 | End: 2019-07-06 | Stop reason: HOSPADM

## 2019-07-06 RX ORDER — SODIUM CHLORIDE 0.9 % (FLUSH) 0.9 %
10 SYRINGE (ML) INJECTION
Status: DISCONTINUED | OUTPATIENT
Start: 2019-07-06 | End: 2019-07-06 | Stop reason: HOSPADM

## 2019-07-06 RX ADMIN — ETOPOSIDE 150 MG: 20 INJECTION INTRAVENOUS at 10:07

## 2019-07-06 RX ADMIN — MAGNESIUM SULFATE HEPTAHYDRATE 1 G: 500 INJECTION, SOLUTION INTRAMUSCULAR; INTRAVENOUS at 10:07

## 2019-07-06 RX ADMIN — HEPARIN 500 UNITS: 100 SYRINGE at 11:07

## 2019-07-06 RX ADMIN — SODIUM CHLORIDE: 9 INJECTION, SOLUTION INTRAVENOUS at 10:07

## 2019-07-06 NOTE — PLAN OF CARE
Problem: Adult Inpatient Plan of Care  Goal: Plan of Care Review  Outcome: Ongoing (interventions implemented as appropriate)  Pt tolerated Etoposide with no complications. VSS. Pt instructed to call MD with any problems. NAD. Pt discharged home via wheelchair.

## 2019-07-08 ENCOUNTER — INFUSION (OUTPATIENT)
Dept: INFUSION THERAPY | Facility: HOSPITAL | Age: 79
End: 2019-07-08
Payer: MEDICARE

## 2019-07-08 ENCOUNTER — INITIAL CONSULT (OUTPATIENT)
Dept: RADIATION ONCOLOGY | Facility: CLINIC | Age: 79
End: 2019-07-08
Payer: MEDICARE

## 2019-07-08 VITALS
HEART RATE: 74 BPM | BODY MASS INDEX: 21.93 KG/M2 | RESPIRATION RATE: 18 BRPM | TEMPERATURE: 98 F | HEIGHT: 62 IN | OXYGEN SATURATION: 97 % | SYSTOLIC BLOOD PRESSURE: 120 MMHG | WEIGHT: 119.19 LBS | DIASTOLIC BLOOD PRESSURE: 53 MMHG

## 2019-07-08 DIAGNOSIS — C34.90 SMALL CELL LUNG CANCER: Primary | ICD-10-CM

## 2019-07-08 DIAGNOSIS — C79.31 SECONDARY MALIGNANT NEOPLASM OF BRAIN: ICD-10-CM

## 2019-07-08 DIAGNOSIS — C34.92 SMALL CELL LUNG CANCER, LEFT: ICD-10-CM

## 2019-07-08 DIAGNOSIS — G93.89 CEREBELLAR MASS: Primary | ICD-10-CM

## 2019-07-08 LAB
ABO + RH BLD: NORMAL
ALBUMIN SERPL BCP-MCNC: 2.5 G/DL (ref 3.5–5.2)
ALP SERPL-CCNC: 202 U/L (ref 55–135)
ALT SERPL W/O P-5'-P-CCNC: 5 U/L (ref 10–44)
ANION GAP SERPL CALC-SCNC: 7 MMOL/L (ref 8–16)
ANISOCYTOSIS BLD QL SMEAR: SLIGHT
AST SERPL-CCNC: 14 U/L (ref 10–40)
BASOPHILS # BLD AUTO: 0.02 K/UL (ref 0–0.2)
BASOPHILS NFR BLD: 0.5 % (ref 0–1.9)
BILIRUB SERPL-MCNC: 0.3 MG/DL (ref 0.1–1)
BLD GP AB SCN CELLS X3 SERPL QL: NORMAL
BUN SERPL-MCNC: 11 MG/DL (ref 8–23)
CALCIUM SERPL-MCNC: 8.2 MG/DL (ref 8.7–10.5)
CHLORIDE SERPL-SCNC: 103 MMOL/L (ref 95–110)
CO2 SERPL-SCNC: 25 MMOL/L (ref 23–29)
CREAT SERPL-MCNC: 0.7 MG/DL (ref 0.5–1.4)
DIFFERENTIAL METHOD: ABNORMAL
EOSINOPHIL # BLD AUTO: 0 K/UL (ref 0–0.5)
EOSINOPHIL NFR BLD: 0 % (ref 0–8)
ERYTHROCYTE [DISTWIDTH] IN BLOOD BY AUTOMATED COUNT: 15.3 % (ref 11.5–14.5)
EST. GFR  (AFRICAN AMERICAN): >60 ML/MIN/1.73 M^2
EST. GFR  (NON AFRICAN AMERICAN): >60 ML/MIN/1.73 M^2
GLUCOSE SERPL-MCNC: 77 MG/DL (ref 70–110)
HCT VFR BLD AUTO: 23 % (ref 37–48.5)
HGB BLD-MCNC: 7.3 G/DL (ref 12–16)
IMM GRANULOCYTES # BLD AUTO: 0.03 K/UL (ref 0–0.04)
IMM GRANULOCYTES NFR BLD AUTO: 0.8 % (ref 0–0.5)
LYMPHOCYTES # BLD AUTO: 1.2 K/UL (ref 1–4.8)
LYMPHOCYTES NFR BLD: 31.7 % (ref 18–48)
MAGNESIUM SERPL-MCNC: 1.5 MG/DL (ref 1.6–2.6)
MCH RBC QN AUTO: 27.8 PG (ref 27–31)
MCHC RBC AUTO-ENTMCNC: 31.7 G/DL (ref 32–36)
MCV RBC AUTO: 88 FL (ref 82–98)
MONOCYTES # BLD AUTO: 0 K/UL (ref 0.3–1)
MONOCYTES NFR BLD: 1 % (ref 4–15)
NEUTROPHILS # BLD AUTO: 2.6 K/UL (ref 1.8–7.7)
NEUTROPHILS NFR BLD: 66 % (ref 38–73)
NRBC BLD-RTO: 0 /100 WBC
PLATELET # BLD AUTO: 310 K/UL (ref 150–350)
PLATELET BLD QL SMEAR: ABNORMAL
PMV BLD AUTO: 8.8 FL (ref 9.2–12.9)
POLYCHROMASIA BLD QL SMEAR: ABNORMAL
POTASSIUM SERPL-SCNC: 4.4 MMOL/L (ref 3.5–5.1)
PROT SERPL-MCNC: 5.2 G/DL (ref 6–8.4)
RBC # BLD AUTO: 2.63 M/UL (ref 4–5.4)
SODIUM SERPL-SCNC: 135 MMOL/L (ref 136–145)
WBC # BLD AUTO: 3.88 K/UL (ref 3.9–12.7)

## 2019-07-08 PROCEDURE — 99999 PR PBB SHADOW E&M-EST. PATIENT-LVL V: CPT | Mod: PBBFAC,,, | Performed by: RADIOLOGY

## 2019-07-08 PROCEDURE — 63600175 PHARM REV CODE 636 W HCPCS: Performed by: INTERNAL MEDICINE

## 2019-07-08 PROCEDURE — 99205 PR OFFICE/OUTPT VISIT, NEW, LEVL V, 60-74 MIN: ICD-10-PCS | Mod: S$PBB,,, | Performed by: RADIOLOGY

## 2019-07-08 PROCEDURE — 99999 PR PBB SHADOW E&M-EST. PATIENT-LVL V: ICD-10-PCS | Mod: PBBFAC,,, | Performed by: RADIOLOGY

## 2019-07-08 PROCEDURE — 86850 RBC ANTIBODY SCREEN: CPT

## 2019-07-08 PROCEDURE — 25000003 PHARM REV CODE 250: Performed by: INTERNAL MEDICINE

## 2019-07-08 PROCEDURE — 99205 OFFICE O/P NEW HI 60 MIN: CPT | Mod: S$PBB,,, | Performed by: RADIOLOGY

## 2019-07-08 PROCEDURE — 83735 ASSAY OF MAGNESIUM: CPT

## 2019-07-08 PROCEDURE — 36591 DRAW BLOOD OFF VENOUS DEVICE: CPT

## 2019-07-08 PROCEDURE — 85025 COMPLETE CBC W/AUTO DIFF WBC: CPT

## 2019-07-08 PROCEDURE — A4216 STERILE WATER/SALINE, 10 ML: HCPCS | Performed by: INTERNAL MEDICINE

## 2019-07-08 PROCEDURE — 80053 COMPREHEN METABOLIC PANEL: CPT

## 2019-07-08 PROCEDURE — 99215 OFFICE O/P EST HI 40 MIN: CPT | Mod: PBBFAC,25 | Performed by: RADIOLOGY

## 2019-07-08 RX ORDER — HEPARIN 100 UNIT/ML
500 SYRINGE INTRAVENOUS
Status: CANCELLED | OUTPATIENT
Start: 2019-07-08

## 2019-07-08 RX ORDER — HEPARIN 100 UNIT/ML
500 SYRINGE INTRAVENOUS
Status: COMPLETED | OUTPATIENT
Start: 2019-07-08 | End: 2019-07-08

## 2019-07-08 RX ORDER — LORAZEPAM 1 MG/1
1 TABLET ORAL
Qty: 5 TABLET | Refills: 0 | Status: SHIPPED | OUTPATIENT
Start: 2019-07-08 | End: 2019-09-04 | Stop reason: ALTCHOICE

## 2019-07-08 RX ORDER — SODIUM CHLORIDE 0.9 % (FLUSH) 0.9 %
10 SYRINGE (ML) INJECTION
Status: COMPLETED | OUTPATIENT
Start: 2019-07-08 | End: 2019-07-08

## 2019-07-08 RX ORDER — SODIUM CHLORIDE 0.9 % (FLUSH) 0.9 %
10 SYRINGE (ML) INJECTION
Status: CANCELLED | OUTPATIENT
Start: 2019-07-08

## 2019-07-08 RX ADMIN — HEPARIN 500 UNITS: 100 SYRINGE at 11:07

## 2019-07-08 RX ADMIN — Medication 10 ML: at 11:07

## 2019-07-08 NOTE — LETTER
July 8, 2019      Stephan Atkinson MD  [address] [facility] [address]           [facility] - Radiation Oncology  [address]  [place] [address]  Phone: [phone]          Patient: Rosita Almodovar   MR Number: [id number]   YOB: 1940   Date of Visit: 7/8/2019       Dear Dr. Stephan Atkinson:    Thank you for referring Rosita Almodovar to me for evaluation. Attached you will find relevant portions of my assessment and plan of care.    If you have questions, please do not hesitate to call me. I look forward to following Rosita Almodovar along with you.    Sincerely,    Shimon Quintana MD    Enclosure  CC:  No Recipients    If you would like to receive this communication electronically, please contact [email or web address] or [phone] to request more information on [facility] [employer] access.    For providers and/or their staff who would like to refer a patient to [facility], please contact us through our one-stop-shop provider referral line, [facility], at [phone].    If you feel you have received this communication in error or would no longer like to receive these types of communications, please e-mail [email or web address]

## 2019-07-08 NOTE — NURSING
Patient's PAC accessed.  Labs drawn from port.  Specimens sent to lab.  PAC flushed, heparinized, and de-accessed.  Patient tolerated well.  Calendar printed and reviewed with pt.

## 2019-07-08 NOTE — PROGRESS NOTES
07/08/2019    Radiation Oncology Consultation    Assessment   This is a 79 y.o. 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 She had limited intracranial metastatic disease on last MRI Brain 5/9/19. She has received 2 cycles of Carbo/Etop/Atezo. She is referred for consideration of radiation due to concern that intracranial disease is causing hyponatremia.     Given the limited intracranial burden of initial MRI, I have low suspicion that it is contributing to her hyponatremia. Since she has not had any imaging of her brain for the past 2 months, I will obtain re-staging scan and make final recommendation based on it. If she continues to have limited disease in the brain, I would recommend continuing chemotherapy and plan to address brain metastases after all cycles completed.        Plan   1) Obtain MRI Brain w/ and w/o in 1 week (patient's request to wait). Prescribed Ativan 1 mg PO prior for claustrophobia.   2) Will make recommendation for treatment of CNS disease based on results of imaging.        Chief Complaint   Patient presents with    Lung Cancer     consult       HPI: Mrs. Almodovar is a 78 y/o female who presented to OSH 5/8/19 with dysarthria. MRI Brain 5/9/19 demonstrated a 1.4 cm Left cerebellar enhancing lesion and smaller Left posterior frontal lesion c/w metastases. CT C/A/P 5/10/19 demonstrated a 6.2 cm Left hilar mass and likely Left adrenal metastasis. Biopsy of Left lung mass 5/22/19 revealed high grade carcinoma with small cell features. Due to limited intracranial disease she was initiated on chemotherapy with Carbo/Etop/Atezo and has now completed 2 cycles. She is referred for consideration of radiation for brain metastases for concerning hyponatremia.     In clinic today, the patient is accompanied by her . She is in a wheelchair but reports using a walker at home. She notes occasional dizziness/vertigo since starting  chemotherapy that she also had in the remote past. She denies HA, N/V, focal weakness, or seizures.       Prior Radiation History: None    Past Medical History:   Diagnosis Date    Acquired hypothyroidism 2019    Brain tumor     COPD (chronic obstructive pulmonary disease) 2019    Gastric ulcer     Hilar mass     Kidney cysts     left    Pleural effusion        Past Surgical History:   Procedure Laterality Date    ANGIOGRAM, CORONARY, WITH LEFT HEART CATHETERIZATION      endoscope      [id number]-CATH LEFT NECK Left 2019    Performed by Reggie Franklin Jr., MD at [facility] OR [address] [place] [address]    kidney cyst excision and drainage         Social History     Tobacco Use    Smoking status: Former Smoker     Packs/day: 1.00     Years: 54.00     Pack years: 54.00     Types: Cigarettes     Last attempt to quit: 2014     Years since quittin.5    Smokeless tobacco: Never Used   Substance Use Topics    Alcohol use: No     Frequency: Never    Drug use: No       Cancer-related family history is not on file.    Current Outpatient Medications on File Prior to Visit   Medication Sig Dispense Refill    albuterol (VENTOLIN HFA) 90 mcg/actuation inhaler Ventolin HFA 90 mcg/actuation aerosol inhaler      atenolol (TENORMIN) 50 MG tablet Take 50 mg by mouth once daily.  1    budesonide-formoterol 160-4.5 mcg (SYMBICORT) 160-4.5 mcg/actuation HFAA Inhale 2 puffs into the lungs every 12 (twelve) hours. Controller 1 Inhaler 11    clonazePAM (KLONOPIN) 0.5 MG tablet TAKE ONE TABLET BY MOUTH THREE TIMES DAILY AS NEEDED FOR ANXIETY OR for panic attacks  3    clopidogrel (PLAVIX) 75 mg tablet Take 75 mg by mouth once daily.  3    cyproheptadine (PERIACTIN) 4 mg tablet Take 1 tablet (4 mg total) by mouth 4 (four) times daily. 90 tablet 2    DECARA 50,000 unit capsule Take 50,000 Units by mouth every 7 days.  3    ergocalciferol (VITAMIN D2) 50,000 unit Cap Take 50,000 Units by mouth every 7 days. Tuesday       furosemide (LASIX) 20 MG tablet Take 20 mg by mouth once daily.       levothyroxine (SYNTHROID) 50 MCG tablet Take 50 mcg by mouth every morning.  3    lidocaine-prilocaine (EMLA) cream Apply to port site 1 hour prior to chemotherapy and cover with saran wrap 30 g 3    lisinopril (PRINIVIL,ZESTRIL) 5 MG tablet Take 5 mg by mouth once daily.       loperamide (IMODIUM) 2 mg capsule Take 1 capsule (2 mg total) by mouth 4 (four) times daily as needed for Diarrhea (Take after each diarrheal bowel movement.  Max 4 tabs per day.). 30 capsule 1    ondansetron (ZOFRAN-ODT) 8 MG TbDL Dissolve 1 tablet (8 mg total) under tongue every 8 (eight) hours as needed (nausea). 30 tablet 3    pantoprazole (PROTONIX) 40 MG tablet TAKE ONE TABLET BY MOUTH EVERY DAY FOR stomach  3    potassium chloride SA (K-DUR,KLOR-CON) 20 MEQ tablet Take 1 tablet (20 mEq total) by mouth once daily. for 7 days 7 tablet 2    pravastatin (PRAVACHOL) 20 MG tablet Take 20 mg by mouth once daily.       predniSONE (DELTASONE) 20 MG tablet TAKE ONE TABLET BY MOUTH EVERY DAY FOR 3 DAYS AND REPEAT FOR FLARE OF LUNGS AS DIRECTED  2    sotalol (BETAPACE) 80 MG tablet TAKE ONE TABLET BY MOUTH TWICE DAILY FOR BLOOD PRESSURE AND HEART  1    tiotropium bromide 2.5 mcg/actuation Mist Inhale 5 mcg into the lungs once daily. Controller 4 g 5    traMADol (ULTRAM) 50 mg tablet Take 50 mg by mouth every 12 (twelve) hours as needed.   3     Current Facility-Administered Medications on File Prior to Visit   Medication Dose Route Frequency Provider Last Rate Last Dose    alteplase injection 2 mg  2 mg Intra-Catheter PRN Stephan Atkinson MD   2 mg at 07/05/19 1408    [COMPLETED] heparin, porcine (PF) 100 unit/mL injection flush 500 Units  500 Units Intravenous 1 time in Clinic/HOD Magali Zamorano MD   500 Units at 07/08/19 1112    [COMPLETED] sodium chloride 0.9% flush 10 mL  10 mL Intravenous 1 time in Clinic/HOD Magali Zamorano MD   10 mL at 07/08/19 1112     "[DISCONTINUED] alteplase injection 2 mg  2 mg Intra-Catheter PRN Yazmin Weiner MD        [DISCONTINUED] heparin, porcine (PF) 100 unit/mL injection flush 500 Units  500 Units Intravenous PRN Yazmin Weiner MD   500 Units at 07/05/19 1732    [DISCONTINUED] sodium chloride 0.9% flush 10 mL  10 mL Intravenous PRN Yazmin Weiner MD           Review of patient's allergies indicates:   Allergen Reactions    Potassium Nausea Only       Review of Systems   Constitutional: Positive for weight loss. Negative for fever.   HENT: Negative for ear pain and sore throat.    Eyes: Negative for blurred vision and double vision.   Respiratory: Positive for shortness of breath. Negative for cough and hemoptysis.    Cardiovascular: Negative for chest pain and leg swelling.   Gastrointestinal: Negative for abdominal pain, constipation, diarrhea, heartburn and nausea.   Genitourinary: Positive for dysuria. Negative for hematuria.   Musculoskeletal: Negative for falls and joint pain.   Neurological: Positive for dizziness and tingling. Negative for speech change, focal weakness, seizures and headaches.   Psychiatric/Behavioral: Negative for depression. The patient is nervous/anxious.         Vital Signs: BP (!) 120/53 (BP Location: Right arm, Patient Position: Sitting)   Pulse 74   Temp 97.9 °F (36.6 °C) (Oral)   Resp 18   Ht 5' 1.5" (1.562 m)   Wt 54.1 kg (119 lb 3.2 oz)   SpO2 97%   BMI 22.16 kg/m²     ECOG Performance Status: 2 - Ambulates, capable of self care only    Physical Exam   Constitutional: She is oriented to person, place, and time and well-developed, well-nourished, and in no distress.   Frail appearing, in wheelchair   HENT:   Head: Normocephalic and atraumatic.   Mouth/Throat: Oropharynx is clear and moist.   Eyes: EOM are normal. No scleral icterus.   Neck: Normal range of motion. Neck supple.   Pulmonary/Chest: No accessory muscle usage. No respiratory distress.   Abdominal: Soft. Normal appearance. She " exhibits no distension.   Musculoskeletal: Normal range of motion. She exhibits no edema.   Lymphadenopathy:     She has no cervical adenopathy.        Right: No supraclavicular adenopathy present.        Left: No supraclavicular adenopathy present.   Neurological: She is alert and oriented to person, place, and time. No cranial nerve deficit.   Skin: Skin is warm and dry.   Psychiatric: Mood, affect and judgment normal.   Vitals reviewed.       Labs:    Imaging: I have personally reviewed the patient's available images and reports and summarized pertinent findings above in HPI.     Pathology: I have personally reviewed the patient's available pathology and summarized pertinent findings above in HPI.       - Thank you for allowing me to participate in the care of your patient.    Shimon Quintana MD, PhD

## 2019-07-17 ENCOUNTER — HOSPITAL ENCOUNTER (OUTPATIENT)
Dept: RADIOLOGY | Facility: HOSPITAL | Age: 79
Discharge: HOME | End: 2019-07-17
Attending: RADIOLOGY
Payer: MEDICARE

## 2019-07-17 DIAGNOSIS — G93.89 CEREBELLAR MASS: ICD-10-CM

## 2019-07-17 PROCEDURE — A9585 GADOBUTROL INJECTION: HCPCS | Performed by: RADIOLOGY

## 2019-07-17 PROCEDURE — 70553 MRI BRAIN STEM W/O & W/DYE: CPT | Mod: 26,,, | Performed by: RADIOLOGY

## 2019-07-17 PROCEDURE — 25500020 PHARM REV CODE 255: Performed by: RADIOLOGY

## 2019-07-17 PROCEDURE — 70553 MRI BRAIN W WO CONTRAST: ICD-10-PCS | Mod: 26,,, | Performed by: RADIOLOGY

## 2019-07-17 PROCEDURE — 70553 MRI BRAIN STEM W/O & W/DYE: CPT | Mod: TC

## 2019-07-17 RX ORDER — GADOBUTROL 604.72 MG/ML
5 INJECTION INTRAVENOUS
Status: COMPLETED | OUTPATIENT
Start: 2019-07-17 | End: 2019-07-17

## 2019-07-17 RX ADMIN — GADOBUTROL 5 ML: 604.72 INJECTION INTRAVENOUS at 11:07

## 2019-07-24 ENCOUNTER — INFUSION (OUTPATIENT)
Dept: INFUSION THERAPY | Facility: HOSPITAL | Age: 79
End: 2019-07-24
Attending: STUDENT IN AN ORGANIZED HEALTH CARE EDUCATION/TRAINING PROGRAM
Payer: MEDICARE

## 2019-07-24 ENCOUNTER — OFFICE VISIT (OUTPATIENT)
Dept: HEMATOLOGY/ONCOLOGY | Facility: CLINIC | Age: 79
End: 2019-07-24
Payer: MEDICARE

## 2019-07-24 VITALS
WEIGHT: 113.31 LBS | SYSTOLIC BLOOD PRESSURE: 100 MMHG | OXYGEN SATURATION: 95 % | BODY MASS INDEX: 20.85 KG/M2 | RESPIRATION RATE: 16 BRPM | TEMPERATURE: 98 F | HEART RATE: 62 BPM | HEIGHT: 62 IN | DIASTOLIC BLOOD PRESSURE: 49 MMHG

## 2019-07-24 VITALS
RESPIRATION RATE: 16 BRPM | HEART RATE: 65 BPM | TEMPERATURE: 98 F | SYSTOLIC BLOOD PRESSURE: 126 MMHG | DIASTOLIC BLOOD PRESSURE: 60 MMHG

## 2019-07-24 DIAGNOSIS — E86.0 DEHYDRATION: ICD-10-CM

## 2019-07-24 DIAGNOSIS — T45.1X5A ANTINEOPLASTIC CHEMOTHERAPY INDUCED ANEMIA: Primary | ICD-10-CM

## 2019-07-24 DIAGNOSIS — D64.81 ANTINEOPLASTIC CHEMOTHERAPY INDUCED ANEMIA: Primary | ICD-10-CM

## 2019-07-24 DIAGNOSIS — E83.42 HYPOMAGNESEMIA: ICD-10-CM

## 2019-07-24 DIAGNOSIS — C34.92 SMALL CELL LUNG CANCER, LEFT: ICD-10-CM

## 2019-07-24 DIAGNOSIS — E87.6 HYPOKALEMIA DUE TO INADEQUATE POTASSIUM INTAKE: ICD-10-CM

## 2019-07-24 DIAGNOSIS — C34.92 SMALL CELL LUNG CANCER, LEFT: Primary | ICD-10-CM

## 2019-07-24 DIAGNOSIS — N17.9 AKI (ACUTE KIDNEY INJURY): ICD-10-CM

## 2019-07-24 LAB
ABO + RH BLD: NORMAL
ALBUMIN SERPL BCP-MCNC: 2.7 G/DL (ref 3.5–5.2)
ALP SERPL-CCNC: 387 U/L (ref 55–135)
ALT SERPL W/O P-5'-P-CCNC: 6 U/L (ref 10–44)
ANION GAP SERPL CALC-SCNC: 11 MMOL/L (ref 8–16)
ANISOCYTOSIS BLD QL SMEAR: SLIGHT
AST SERPL-CCNC: 16 U/L (ref 10–40)
BASO STIPL BLD QL SMEAR: ABNORMAL
BASOPHILS # BLD AUTO: ABNORMAL K/UL (ref 0–0.2)
BASOPHILS NFR BLD: 0 % (ref 0–1.9)
BILIRUB SERPL-MCNC: 0.3 MG/DL (ref 0.1–1)
BLD GP AB SCN CELLS X3 SERPL QL: NORMAL
BLD PROD TYP BPU: NORMAL
BLOOD UNIT EXPIRATION DATE: NORMAL
BLOOD UNIT TYPE CODE: 5100
BLOOD UNIT TYPE: NORMAL
BUN SERPL-MCNC: 13 MG/DL (ref 8–23)
CALCIUM SERPL-MCNC: 8.3 MG/DL (ref 8.7–10.5)
CHLORIDE SERPL-SCNC: 93 MMOL/L (ref 95–110)
CO2 SERPL-SCNC: 32 MMOL/L (ref 23–29)
CODING SYSTEM: NORMAL
CREAT SERPL-MCNC: 1.5 MG/DL (ref 0.5–1.4)
DIFFERENTIAL METHOD: ABNORMAL
DISPENSE STATUS: NORMAL
EOSINOPHIL # BLD AUTO: ABNORMAL K/UL (ref 0–0.5)
EOSINOPHIL NFR BLD: 1 % (ref 0–8)
ERYTHROCYTE [DISTWIDTH] IN BLOOD BY AUTOMATED COUNT: 15.9 % (ref 11.5–14.5)
EST. GFR  (AFRICAN AMERICAN): 38.5 ML/MIN/1.73 M^2
EST. GFR  (NON AFRICAN AMERICAN): 33.4 ML/MIN/1.73 M^2
GLUCOSE SERPL-MCNC: 83 MG/DL (ref 70–110)
HCT VFR BLD AUTO: 23.1 % (ref 37–48.5)
HGB BLD-MCNC: 7 G/DL (ref 12–16)
HYPOCHROMIA BLD QL SMEAR: ABNORMAL
IMM GRANULOCYTES # BLD AUTO: ABNORMAL K/UL (ref 0–0.04)
IMM GRANULOCYTES NFR BLD AUTO: ABNORMAL % (ref 0–0.5)
LYMPHOCYTES # BLD AUTO: ABNORMAL K/UL (ref 1–4.8)
LYMPHOCYTES NFR BLD: 25 % (ref 18–48)
MAGNESIUM SERPL-MCNC: 1.3 MG/DL (ref 1.6–2.6)
MCH RBC QN AUTO: 27.2 PG (ref 27–31)
MCHC RBC AUTO-ENTMCNC: 30.3 G/DL (ref 32–36)
MCV RBC AUTO: 90 FL (ref 82–98)
METAMYELOCYTES NFR BLD MANUAL: 3 %
MONOCYTES # BLD AUTO: ABNORMAL K/UL (ref 0.3–1)
MONOCYTES NFR BLD: 8 % (ref 4–15)
MYELOCYTES NFR BLD MANUAL: 2 %
NEUTROPHILS NFR BLD: 58 % (ref 38–73)
NEUTS BAND NFR BLD MANUAL: 3 %
NRBC BLD-RTO: 1 /100 WBC
NUM UNITS TRANS PACKED RBC: NORMAL
OVALOCYTES BLD QL SMEAR: ABNORMAL
PLATELET # BLD AUTO: 523 K/UL (ref 150–350)
PMV BLD AUTO: 9.2 FL (ref 9.2–12.9)
POIKILOCYTOSIS BLD QL SMEAR: SLIGHT
POLYCHROMASIA BLD QL SMEAR: ABNORMAL
POTASSIUM SERPL-SCNC: 3.1 MMOL/L (ref 3.5–5.1)
PROT SERPL-MCNC: 5.6 G/DL (ref 6–8.4)
RBC # BLD AUTO: 2.57 M/UL (ref 4–5.4)
SODIUM SERPL-SCNC: 136 MMOL/L (ref 136–145)
WBC # BLD AUTO: 8.79 K/UL (ref 3.9–12.7)

## 2019-07-24 PROCEDURE — P9016 RBC LEUKOCYTES REDUCED: HCPCS

## 2019-07-24 PROCEDURE — 99999 PR PBB SHADOW E&M-EST. PATIENT-LVL V: ICD-10-PCS | Mod: PBBFAC,GC,, | Performed by: STUDENT IN AN ORGANIZED HEALTH CARE EDUCATION/TRAINING PROGRAM

## 2019-07-24 PROCEDURE — 96365 THER/PROPH/DIAG IV INF INIT: CPT

## 2019-07-24 PROCEDURE — 96366 THER/PROPH/DIAG IV INF ADDON: CPT

## 2019-07-24 PROCEDURE — 99214 PR OFFICE/OUTPT VISIT, EST, LEVL IV, 30-39 MIN: ICD-10-PCS | Mod: S$PBB,GC,, | Performed by: STUDENT IN AN ORGANIZED HEALTH CARE EDUCATION/TRAINING PROGRAM

## 2019-07-24 PROCEDURE — 99215 OFFICE O/P EST HI 40 MIN: CPT | Mod: PBBFAC,25 | Performed by: STUDENT IN AN ORGANIZED HEALTH CARE EDUCATION/TRAINING PROGRAM

## 2019-07-24 PROCEDURE — 25000003 PHARM REV CODE 250: Performed by: STUDENT IN AN ORGANIZED HEALTH CARE EDUCATION/TRAINING PROGRAM

## 2019-07-24 PROCEDURE — 36430 TRANSFUSION BLD/BLD COMPNT: CPT

## 2019-07-24 PROCEDURE — 85027 COMPLETE CBC AUTOMATED: CPT

## 2019-07-24 PROCEDURE — 83735 ASSAY OF MAGNESIUM: CPT

## 2019-07-24 PROCEDURE — 96367 TX/PROPH/DG ADDL SEQ IV INF: CPT

## 2019-07-24 PROCEDURE — 36593 DECLOT VASCULAR DEVICE: CPT

## 2019-07-24 PROCEDURE — 86920 COMPATIBILITY TEST SPIN: CPT

## 2019-07-24 PROCEDURE — 85007 BL SMEAR W/DIFF WBC COUNT: CPT | Mod: NCS

## 2019-07-24 PROCEDURE — 86901 BLOOD TYPING SEROLOGIC RH(D): CPT

## 2019-07-24 PROCEDURE — 80053 COMPREHEN METABOLIC PANEL: CPT

## 2019-07-24 PROCEDURE — 99999 PR PBB SHADOW E&M-EST. PATIENT-LVL V: CPT | Mod: PBBFAC,GC,, | Performed by: STUDENT IN AN ORGANIZED HEALTH CARE EDUCATION/TRAINING PROGRAM

## 2019-07-24 PROCEDURE — 99214 OFFICE O/P EST MOD 30 MIN: CPT | Mod: S$PBB,GC,, | Performed by: STUDENT IN AN ORGANIZED HEALTH CARE EDUCATION/TRAINING PROGRAM

## 2019-07-24 PROCEDURE — 63600175 PHARM REV CODE 636 W HCPCS: Mod: JG | Performed by: STUDENT IN AN ORGANIZED HEALTH CARE EDUCATION/TRAINING PROGRAM

## 2019-07-24 PROCEDURE — 63600175 PHARM REV CODE 636 W HCPCS: Performed by: STUDENT IN AN ORGANIZED HEALTH CARE EDUCATION/TRAINING PROGRAM

## 2019-07-24 RX ORDER — HEPARIN 100 UNIT/ML
500 SYRINGE INTRAVENOUS
Status: DISCONTINUED | OUTPATIENT
Start: 2019-07-24 | End: 2019-07-24 | Stop reason: HOSPADM

## 2019-07-24 RX ORDER — SODIUM CHLORIDE 0.9 % (FLUSH) 0.9 %
10 SYRINGE (ML) INJECTION
Status: DISCONTINUED | OUTPATIENT
Start: 2019-07-24 | End: 2019-07-24 | Stop reason: HOSPADM

## 2019-07-24 RX ORDER — DIPHENHYDRAMINE HCL 25 MG
25 CAPSULE ORAL
Status: COMPLETED | OUTPATIENT
Start: 2019-07-24 | End: 2019-07-24

## 2019-07-24 RX ORDER — HYDROCODONE BITARTRATE AND ACETAMINOPHEN 500; 5 MG/1; MG/1
TABLET ORAL ONCE
Status: DISCONTINUED | OUTPATIENT
Start: 2019-07-24 | End: 2019-07-24 | Stop reason: HOSPADM

## 2019-07-24 RX ORDER — ACETAMINOPHEN 325 MG/1
650 TABLET ORAL
Status: COMPLETED | OUTPATIENT
Start: 2019-07-24 | End: 2019-07-24

## 2019-07-24 RX ORDER — HEPARIN 100 UNIT/ML
500 SYRINGE INTRAVENOUS
Status: CANCELLED | OUTPATIENT
Start: 2019-07-24

## 2019-07-24 RX ORDER — ACETAMINOPHEN 325 MG/1
650 TABLET ORAL
Status: CANCELLED | OUTPATIENT
Start: 2019-07-24

## 2019-07-24 RX ORDER — POTASSIUM CHLORIDE 750 MG/1
40 TABLET, EXTENDED RELEASE ORAL ONCE
Status: CANCELLED | OUTPATIENT
Start: 2019-07-24

## 2019-07-24 RX ORDER — HYDROCODONE BITARTRATE AND ACETAMINOPHEN 500; 5 MG/1; MG/1
TABLET ORAL ONCE
Status: CANCELLED | OUTPATIENT
Start: 2019-07-24 | End: 2019-07-24

## 2019-07-24 RX ORDER — DIPHENHYDRAMINE HCL 25 MG
25 CAPSULE ORAL
Status: CANCELLED | OUTPATIENT
Start: 2019-07-24

## 2019-07-24 RX ORDER — SODIUM CHLORIDE 0.9 % (FLUSH) 0.9 %
10 SYRINGE (ML) INJECTION
Status: CANCELLED | OUTPATIENT
Start: 2019-07-24

## 2019-07-24 RX ORDER — POTASSIUM CHLORIDE 20 MEQ/1
40 TABLET, EXTENDED RELEASE ORAL ONCE
Status: COMPLETED | OUTPATIENT
Start: 2019-07-24 | End: 2019-07-24

## 2019-07-24 RX ORDER — SODIUM CHLORIDE AND POTASSIUM CHLORIDE 150; 900 MG/100ML; MG/100ML
INJECTION, SOLUTION INTRAVENOUS CONTINUOUS
Status: DISCONTINUED | OUTPATIENT
Start: 2019-07-24 | End: 2019-07-24 | Stop reason: HOSPADM

## 2019-07-24 RX ADMIN — MAGNESIUM SULFATE 3 G: 500 INJECTION, SOLUTION INTRAMUSCULAR; INTRAVENOUS at 12:07

## 2019-07-24 RX ADMIN — POTASSIUM CHLORIDE AND SODIUM CHLORIDE: 900; 150 INJECTION, SOLUTION INTRAVENOUS at 12:07

## 2019-07-24 RX ADMIN — ACETAMINOPHEN 650 MG: 325 TABLET ORAL at 01:07

## 2019-07-24 RX ADMIN — DIPHENHYDRAMINE HYDROCHLORIDE 25 MG: 25 CAPSULE ORAL at 01:07

## 2019-07-24 RX ADMIN — ALTEPLASE 2 MG: 2.2 INJECTION, POWDER, LYOPHILIZED, FOR SOLUTION INTRAVENOUS at 08:07

## 2019-07-24 RX ADMIN — POTASSIUM CHLORIDE 40 MEQ: 1500 TABLET, EXTENDED RELEASE ORAL at 12:07

## 2019-07-24 NOTE — PLAN OF CARE
Problem: Adult Inpatient Plan of Care  Goal: Plan of Care Review  Outcome: Outcome(s) achieved Date Met: 07/24/19  Patient tolerated 1 unit of PRBCs, IVFs, Mg/K replacements. VS stable. Labs reviewed. Port flushed, heparin locked, and de-accessed. AVS provided. Discharged home.

## 2019-07-24 NOTE — NURSING
0852 left chest wall port accessed withoutissue flushes easily, unable to aspirate blood despite positiion changes and coughing, deep breathing, cath gokul 2 ml instilled to port, pt to dr olmstead, will notify infusion nurse of cath gokul

## 2019-07-24 NOTE — PROGRESS NOTES
PATIENT: Rosita Almodovar  MRN: [id number]  DATE: 7/24/2019      Diagnosis:   1. Antineoplastic chemotherapy induced anemia    2. Small cell lung cancer, left    3. 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  She was started on carboplatin, etoposide, and atezolizumab.  No focal neurologic deficits and thus WBRT was initially deferred.   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  See comment.  Comment:  The left lung biopsy shows a malignant proliferation of cells with increased nuclear cytoplasmic ratio, stippled chromatin pattern with small amount of cytoplasm. Some of cells show molding and crush artifact. The majority of the cells are relatively small in size, however there are a few areas with larger cells within increased amount of cytoplasm. The cells are arranged in sheets and nests with brisk mitotic activity and areas of necrosis. Immunohistochemical stains reveal the tumor cells to stain positively with cytokeratin AE1/AE3, synaptophysin, chromogranin and TTF-1. Immunohistochemical stain for p63 is negative within the tumor cells. Immunostain for cytokeratin 7 shows positive staining in the background lung but is negative within tumor cells. All stains have atisfactory positive negative controls. 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 Correlate clinically.        Subjective:    Interval History: Ms. Almodovar is here for extensive stage small cell lung cancer prior to cycle 3    Adverse effects of cycle 2:  Denies diarrhea.  Slight improvement with activity and appetite but still remains very fatigued.  Denies bruising, bleeding, or hemoptysis.    Denies fevers, chills, chest pain, worsening shortness of breath, abdominal pain, nausea, vomiting, or constipation.  Has right sided headache (history of fall and hit head on that side) that is chronic.  Denies worsening vision or hearing changes.  Denies new focal unilateral weakness.    Chronic back pain managed with tramadol.    Her  accompanies her at this visit.     Past Medical History:   Past Medical History:   Diagnosis Date    Acquired hypothyroidism 5/8/2019    Brain tumor     COPD (chronic obstructive pulmonary disease) 5/8/2019    Gastric ulcer     Hilar mass     Kidney cysts     left    Pleural effusion        Past Surgical HIstory:   Past Surgical History:   Procedure Laterality Date    ANGIOGRAM, CORONARY, WITH LEFT HEART CATHETERIZATION      endoscope      [id number]-[id number]-K-CATH LEFT NECK Left 6/4/2019    Performed by Reggie Franklin Jr., MD at [facility] OR [address] [place] [address]    kidney cyst excision and drainage         Family History: No family history on file.    Social History:  reports that she quit smoking about 5 years ago. Her smoking use included cigarettes. She has a 54.00 pack-year smoking history. She has never used smokeless tobacco. She reports that she does not drink alcohol or use drugs.    Allergies:  Review of patient's allergies indicates:   Allergen Reactions    Potassium Nausea Only       Medications:  Current Outpatient Medications   Medication Sig Dispense Refill    albuterol (VENTOLIN HFA) 90 mcg/actuation inhaler Ventolin HFA 90 mcg/actuation aerosol  inhaler      atenolol (TENORMIN) 50 MG tablet Take 50 mg by mouth once daily.  1    budesonide-formoterol 160-4.5 mcg (SYMBICORT) 160-4.5 mcg/actuation HFAA Inhale 2 puffs into the lungs every 12 (twelve) hours. Controller 1 Inhaler 11    clonazePAM (KLONOPIN) 0.5 MG tablet TAKE ONE TABLET BY MOUTH THREE TIMES DAILY AS NEEDED FOR ANXIETY OR for panic attacks  3    clopidogrel (PLAVIX) 75 mg tablet Take 75 mg by mouth once daily.  3    cyproheptadine (PERIACTIN) 4 mg tablet Take 1 tablet (4 mg total) by mouth 4 (four) times daily. 90 tablet 2    DECARA 50,000 unit capsule Take 50,000 Units by mouth every 7 days.  3    ergocalciferol (VITAMIN D2) 50,000 unit Cap Take 50,000 Units by mouth every 7 days. Tuesday      furosemide (LASIX) 20 MG tablet Take 20 mg by mouth once daily.       levothyroxine (SYNTHROID) 50 MCG tablet Take 50 mcg by mouth every morning.  3    lidocaine-prilocaine (EMLA) cream Apply to port site 1 hour prior to chemotherapy and cover with saran wrap 30 g 3    lisinopril (PRINIVIL,ZESTRIL) 5 MG tablet Take 5 mg by mouth once daily.       LORazepam (ATIVAN) 1 MG tablet Take 1 tablet (1 mg total) by mouth as needed for Anxiety (Take 1-2 tablets 30 minutes prior to procedure). 5 tablet 0    ondansetron (ZOFRAN-ODT) 8 MG TbDL Dissolve 1 tablet (8 mg total) under tongue every 8 (eight) hours as needed (nausea). 30 tablet 3    pantoprazole (PROTONIX) 40 MG tablet TAKE ONE TABLET BY MOUTH EVERY DAY FOR stomach  3    pravastatin (PRAVACHOL) 20 MG tablet Take 20 mg by mouth once daily.       predniSONE (DELTASONE) 20 MG tablet TAKE ONE TABLET BY MOUTH EVERY DAY FOR 3 DAYS AND REPEAT FOR FLARE OF LUNGS AS DIRECTED  2    sotalol (BETAPACE) 80 MG tablet TAKE ONE TABLET BY MOUTH TWICE DAILY FOR BLOOD PRESSURE AND HEART  1    tiotropium bromide 2.5 mcg/actuation Mist Inhale 5 mcg into the lungs once daily. Controller 4 g 5    traMADol (ULTRAM) 50 mg tablet Take 50 mg by mouth every 12 (twelve)  hours as needed.   3     No current facility-administered medications for this visit.      Facility-Administered Medications Ordered in Other Visits   Medication Dose Route Frequency Provider Last Rate Last Dose    alteplase injection 2 mg  2 mg Intra-Catheter PRN Stephan Atkinson MD   2 mg at 07/05/19 1408       Review of Systems   Constitutional: Positive for activity change, appetite change, fatigue and unexpected weight change. Negative for chills and fever.   HENT: Negative for nosebleeds.    Eyes: Negative for visual disturbance.   Respiratory: Positive for shortness of breath. Negative for cough.    Cardiovascular: Negative for chest pain and leg swelling.   Gastrointestinal: Negative for abdominal distention, abdominal pain, constipation, diarrhea and nausea.   Endocrine: Negative for cold intolerance and heat intolerance.   Genitourinary: Negative for dysuria.   Musculoskeletal: Positive for back pain (chronic unchanged).   Skin: Negative for color change and rash.   Neurological: Positive for dizziness, weakness, light-headedness and numbness.   Hematological: Negative for adenopathy.   Psychiatric/Behavioral: Negative for confusion.       ECOG Performance Status:  3  Objective:      Vitals:   There were no vitals filed for this visit.  BMI: There is no height or weight on file to calculate BMI.   Wt Readings from Last 10 Encounters:   07/08/19 54.1 kg (119 lb 3.2 oz)   07/03/19 51.2 kg (112 lb 14 oz)   06/21/19 53.1 kg (117 lb)   06/20/19 53.4 kg (117 lb 11.6 oz)   06/05/19 56 kg (123 lb 7.3 oz)   06/04/19 54.9 kg (121 lb)   05/30/19 54.9 kg (121 lb)   05/29/19 54.9 kg (121 lb)   05/29/19 54.9 kg (121 lb)   05/29/19 55.1 kg (121 lb 7.6 oz)         Physical Exam   Constitutional: She appears well-developed.   HENT:   Head: Normocephalic.   Eyes: Pupils are equal, round, and reactive to light. EOM are normal. No scleral icterus.   Neck: Normal range of motion. No tracheal deviation present.   Cardiovascular:  Normal rate, regular rhythm and normal heart sounds. Exam reveals no gallop and no friction rub.   No murmur heard.  Pulmonary/Chest: Effort normal. No respiratory distress. She has no wheezes. She has no rales.   Diminished breath sounds left worse than right.   Abdominal: Soft. Bowel sounds are normal. She exhibits no distension and no mass. There is no tenderness. There is no guarding.   Musculoskeletal: Normal range of motion. She exhibits no edema.   Lymphadenopathy:     She has no cervical adenopathy.   Neurological: She is alert.   Skin: Skin is warm and dry.       Laboratory Data:   Recent Results (from the past 24 hour(s))   Comprehensive metabolic panel    Collection Time: 07/24/19  8:29 AM   Result Value Ref Range    Sodium 136 136 - 145 mmol/L    Potassium 3.1 (L) 3.5 - 5.1 mmol/L    Chloride 93 (L) 95 - 110 mmol/L    CO2 32 (H) 23 - 29 mmol/L    Glucose 83 70 - 110 mg/dL    BUN, Bld 13 8 - 23 mg/dL    Creatinine 1.5 (H) 0.5 - 1.4 mg/dL    Calcium 8.3 (L) 8.7 - 10.5 mg/dL    Total Protein 5.6 (L) 6.0 - 8.4 g/dL    Albumin 2.7 (L) 3.5 - 5.2 g/dL    Total Bilirubin 0.3 0.1 - 1.0 mg/dL    Alkaline Phosphatase 387 (H) 55 - 135 U/L    AST 16 10 - 40 U/L    ALT 6 (L) 10 - 44 U/L    Anion Gap 11 8 - 16 mmol/L    eGFR if African American 38.5 (A) >60 mL/min/1.73 m^2    eGFR if non  33.4 (A) >60 mL/min/1.73 m^2   CBC auto differential    Collection Time: 07/24/19  8:29 AM   Result Value Ref Range    WBC 8.79 3.90 - 12.70 K/uL    RBC 2.57 (L) 4.00 - 5.40 M/uL    Hemoglobin 7.0 (L) 12.0 - 16.0 g/dL    Hematocrit 23.1 (L) 37.0 - 48.5 %    Mean Corpuscular Volume 90 82 - 98 fL    Mean Corpuscular Hemoglobin 27.2 27.0 - 31.0 pg    Mean Corpuscular Hemoglobin Conc 30.3 (L) 32.0 - 36.0 g/dL    RDW 15.9 (H) 11.5 - 14.5 %    Platelets 523 (H) 150 - 350 K/uL    MPV 9.2 9.2 - 12.9 fL    Immature Granulocytes CANCELED 0.0 - 0.5 %    Immature Grans (Abs) CANCELED 0.00 - 0.04 K/uL    Lymph # CANCELED 1.0 - 4.8  K/uL    Mono # CANCELED 0.3 - 1.0 K/uL    Eos # CANCELED 0.0 - 0.5 K/uL    Baso # CANCELED 0.00 - 0.20 K/uL    nRBC 1 (A) 0 /100 WBC    Gran% 58.0 38.0 - 73.0 %    Lymph% 25.0 18.0 - 48.0 %    Mono% 8.0 4.0 - 15.0 %    Eosinophil% 1.0 0.0 - 8.0 %    Basophil% 0.0 0.0 - 1.9 %    Bands 3.0 %    Metamyelocytes 3.0 %    Myelocytes 2.0 %    Aniso Slight     Poik Slight     Poly Occasional     Hypo Occasional     Ovalocytes Occasional     Basophilic Stippling Occasional     Differential Method Manual    Magnesium    Collection Time: 07/24/19  8:29 AM   Result Value Ref Range    Magnesium 1.3 (L) 1.6 - 2.6 mg/dL           Imaging: MRI 7/17/19  CLINICAL HISTORY:  Brain mets, re-staging; Other specified disorders of brain    TECHNIQUE:  Sagittal and axial T1, axial T2, axial FLAIR, axial gradient, axial diffusion imaging of the whole brain pre-contrast with postcontrast axial T1 and axial spoiled gradient imaging reformatted in the coronal and sagittal planes.. 8 ml of Gadavist injected intravenously.    COMPARISON:  05/09/2019    FINDINGS:  6-7 mm enhancing lesion left cerebellum previously measuring 1.2 cm.  There is punctate enhancement associated with the previous ring enhancing lesion in the left parasagittal posterior frontal cortex.  There is no new or increased sized regions of enhancement to suggest definite new or worsening lesions.    Patchy and confluent T2 FLAIR regions of hyperintensities throughout the supratentorial which may represent posttherapy change or chronic microvascular ischemic change.  There is no restricted diffusion to suggest acute infarction.    Continued heterogeneous attenuation of the calvarium concerning for calvarial metastases    Induration and edema in the right parietal soft tissues which may represent cutaneous extension of osseous lesion versus superimposed inflammatory/infectious or posttraumatic process clinical correlation advised.    This report was flagged in Epic as abnormal.       Impression       Interval reduced sized enhancing lesions left cerebellum and left parasagittal frontal lobe concerning for evolving metastases.  No evidence for new lesion or increased size lesion to suggest worsening intracranial metastatic disease.    Continued heterogeneous marrow signal throughout the calvarium and visualized cervical spine concerning for osseous metastases.    Interval development of a subtle area of edema signal within the right parietal soft tissues which may be sequela of traumatic injury with soft tissue inflammatory, infectious or extension of calvarial metastatic disease to be considered.  Clinical correlation advised.    T2 flair signal hyperintensity             Assessment:       1. Antineoplastic chemotherapy induced anemia    2. Small cell lung cancer, left    3. YAKOV (acute kidney injury)           Plan:       1.  Chemotherapy induced anemia -   -Hold chemo today  -IV fluids and transfuse 1 u prbc  -Follow up next week for cycle 3    2. 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  While treating small cell lung cancer is the priority, typical approach to purely large cell neuroendocrine is similar to that of NSCLC.  Small cell lung cancer treatment overlaps with that of NSCLC with platinum and immunotherapy components.  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  Was started on carboplatin, etoposide and atezolizumab.  -Plan for carboplatin, etoposide, and atezolizumab.   --Hold chemo today  --Will have her get IVF, potassium, and magnesium today  --Imodium PRN for chemo induced diarrhea  --Prescribed potassium daily x 7 days with follow up labs next week  -Continue with HH/PT at home  -s/p port placement 6/4/19  -Referred to radiation oncology for WBRT with her worsening hyponatremia. 7/17/19 MRI brain with improvement since chemotherapy  --Will return to radiation oncology for WBRT after she completes 4 cycles.  -Return to clinic  7/31/19 for cycle 3 evaluation  -Will scan CT C/A/P prior to cycle 4.  --Monitor TSH and HbA1c q4 cycles    No orders of the defined types were placed in this encounter.        Stephan Atkinson MD  Hematology Oncology Fellow PGY6  Pager [phone]  Distress Screening Results: Psychosocial Distress screening score of   noted and reviewed. No intervention indicated.     STAFF NOTE:  I have personally taken the history and examined this patient and agree with Dr. Atkinson's Note as stated above.  Distress Screening Results: Psychosocial Distress screening score of Distress Score: 8 noted and reviewed. No intervention indicated.

## 2019-07-31 ENCOUNTER — INFUSION (OUTPATIENT)
Dept: INFUSION THERAPY | Facility: HOSPITAL | Age: 79
End: 2019-07-31
Attending: INTERNAL MEDICINE
Payer: MEDICARE

## 2019-07-31 ENCOUNTER — OFFICE VISIT (OUTPATIENT)
Dept: HEMATOLOGY/ONCOLOGY | Facility: CLINIC | Age: 79
End: 2019-07-31
Payer: MEDICARE

## 2019-07-31 VITALS
DIASTOLIC BLOOD PRESSURE: 60 MMHG | HEART RATE: 69 BPM | TEMPERATURE: 98 F | SYSTOLIC BLOOD PRESSURE: 126 MMHG | RESPIRATION RATE: 18 BRPM

## 2019-07-31 VITALS
TEMPERATURE: 98 F | HEART RATE: 66 BPM | DIASTOLIC BLOOD PRESSURE: 56 MMHG | OXYGEN SATURATION: 96 % | SYSTOLIC BLOOD PRESSURE: 106 MMHG

## 2019-07-31 DIAGNOSIS — C34.92 SMALL CELL LUNG CANCER, LEFT: Primary | ICD-10-CM

## 2019-07-31 DIAGNOSIS — D64.81 ANTINEOPLASTIC CHEMOTHERAPY INDUCED ANEMIA: ICD-10-CM

## 2019-07-31 DIAGNOSIS — N17.9 AKI (ACUTE KIDNEY INJURY): ICD-10-CM

## 2019-07-31 DIAGNOSIS — T45.1X5A ANTINEOPLASTIC CHEMOTHERAPY INDUCED ANEMIA: ICD-10-CM

## 2019-07-31 LAB
ALBUMIN SERPL BCP-MCNC: 2.4 G/DL (ref 3.5–5.2)
ALP SERPL-CCNC: 343 U/L (ref 55–135)
ALT SERPL W/O P-5'-P-CCNC: <5 U/L (ref 10–44)
ANION GAP SERPL CALC-SCNC: 7 MMOL/L (ref 8–16)
AST SERPL-CCNC: 18 U/L (ref 10–40)
BASOPHILS # BLD AUTO: 0.04 K/UL (ref 0–0.2)
BASOPHILS NFR BLD: 0.4 % (ref 0–1.9)
BILIRUB SERPL-MCNC: 0.3 MG/DL (ref 0.1–1)
BUN SERPL-MCNC: 11 MG/DL (ref 8–23)
CALCIUM SERPL-MCNC: 8.2 MG/DL (ref 8.7–10.5)
CHLORIDE SERPL-SCNC: 100 MMOL/L (ref 95–110)
CO2 SERPL-SCNC: 29 MMOL/L (ref 23–29)
CREAT SERPL-MCNC: 0.9 MG/DL (ref 0.5–1.4)
DIFFERENTIAL METHOD: ABNORMAL
EOSINOPHIL # BLD AUTO: 0 K/UL (ref 0–0.5)
EOSINOPHIL NFR BLD: 0 % (ref 0–8)
ERYTHROCYTE [DISTWIDTH] IN BLOOD BY AUTOMATED COUNT: 18.2 % (ref 11.5–14.5)
EST. GFR  (AFRICAN AMERICAN): >60 ML/MIN/1.73 M^2
EST. GFR  (NON AFRICAN AMERICAN): >60 ML/MIN/1.73 M^2
GLUCOSE SERPL-MCNC: 74 MG/DL (ref 70–110)
HCT VFR BLD AUTO: 28.7 % (ref 37–48.5)
HGB BLD-MCNC: 8.6 G/DL (ref 12–16)
IMM GRANULOCYTES # BLD AUTO: 0.24 K/UL (ref 0–0.04)
IMM GRANULOCYTES NFR BLD AUTO: 2.2 % (ref 0–0.5)
LYMPHOCYTES # BLD AUTO: 2.3 K/UL (ref 1–4.8)
LYMPHOCYTES NFR BLD: 21 % (ref 18–48)
MAGNESIUM SERPL-MCNC: 1.8 MG/DL (ref 1.6–2.6)
MCH RBC QN AUTO: 28.3 PG (ref 27–31)
MCHC RBC AUTO-ENTMCNC: 30 G/DL (ref 32–36)
MCV RBC AUTO: 94 FL (ref 82–98)
MONOCYTES # BLD AUTO: 1.2 K/UL (ref 0.3–1)
MONOCYTES NFR BLD: 10.7 % (ref 4–15)
NEUTROPHILS # BLD AUTO: 7.2 K/UL (ref 1.8–7.7)
NEUTROPHILS NFR BLD: 65.7 % (ref 38–73)
NRBC BLD-RTO: 0 /100 WBC
PLATELET # BLD AUTO: 498 K/UL (ref 150–350)
PMV BLD AUTO: 9.4 FL (ref 9.2–12.9)
POTASSIUM SERPL-SCNC: 4.1 MMOL/L (ref 3.5–5.1)
PROT SERPL-MCNC: 5.5 G/DL (ref 6–8.4)
RBC # BLD AUTO: 3.04 M/UL (ref 4–5.4)
SODIUM SERPL-SCNC: 136 MMOL/L (ref 136–145)
WBC # BLD AUTO: 10.93 K/UL (ref 3.9–12.7)

## 2019-07-31 PROCEDURE — 99497 PR ADVNCD CARE PLAN 30 MIN: ICD-10-PCS | Mod: S$PBB,GC,, | Performed by: STUDENT IN AN ORGANIZED HEALTH CARE EDUCATION/TRAINING PROGRAM

## 2019-07-31 PROCEDURE — 99215 PR OFFICE/OUTPT VISIT, EST, LEVL V, 40-54 MIN: ICD-10-PCS | Mod: S$PBB,GC,, | Performed by: STUDENT IN AN ORGANIZED HEALTH CARE EDUCATION/TRAINING PROGRAM

## 2019-07-31 PROCEDURE — 63600175 PHARM REV CODE 636 W HCPCS: Performed by: INTERNAL MEDICINE

## 2019-07-31 PROCEDURE — 99999 PR PBB SHADOW E&M-EST. PATIENT-LVL IV: CPT | Mod: PBBFAC,GC,, | Performed by: STUDENT IN AN ORGANIZED HEALTH CARE EDUCATION/TRAINING PROGRAM

## 2019-07-31 PROCEDURE — 96413 CHEMO IV INFUSION 1 HR: CPT

## 2019-07-31 PROCEDURE — 25000003 PHARM REV CODE 250: Performed by: INTERNAL MEDICINE

## 2019-07-31 PROCEDURE — 80053 COMPREHEN METABOLIC PANEL: CPT

## 2019-07-31 PROCEDURE — 83735 ASSAY OF MAGNESIUM: CPT

## 2019-07-31 PROCEDURE — 99214 OFFICE O/P EST MOD 30 MIN: CPT | Mod: PBBFAC,25 | Performed by: STUDENT IN AN ORGANIZED HEALTH CARE EDUCATION/TRAINING PROGRAM

## 2019-07-31 PROCEDURE — 85025 COMPLETE CBC W/AUTO DIFF WBC: CPT

## 2019-07-31 PROCEDURE — A4216 STERILE WATER/SALINE, 10 ML: HCPCS | Performed by: INTERNAL MEDICINE

## 2019-07-31 PROCEDURE — 99497 ADVNCD CARE PLAN 30 MIN: CPT | Mod: PBBFAC | Performed by: STUDENT IN AN ORGANIZED HEALTH CARE EDUCATION/TRAINING PROGRAM

## 2019-07-31 PROCEDURE — 96417 CHEMO IV INFUS EACH ADDL SEQ: CPT

## 2019-07-31 PROCEDURE — 99999 PR PBB SHADOW E&M-EST. PATIENT-LVL IV: ICD-10-PCS | Mod: PBBFAC,GC,, | Performed by: STUDENT IN AN ORGANIZED HEALTH CARE EDUCATION/TRAINING PROGRAM

## 2019-07-31 PROCEDURE — 99497 ADVNCD CARE PLAN 30 MIN: CPT | Mod: S$PBB,GC,, | Performed by: STUDENT IN AN ORGANIZED HEALTH CARE EDUCATION/TRAINING PROGRAM

## 2019-07-31 PROCEDURE — 99215 OFFICE O/P EST HI 40 MIN: CPT | Mod: S$PBB,GC,, | Performed by: STUDENT IN AN ORGANIZED HEALTH CARE EDUCATION/TRAINING PROGRAM

## 2019-07-31 PROCEDURE — 96367 TX/PROPH/DG ADDL SEQ IV INF: CPT

## 2019-07-31 RX ORDER — SODIUM CHLORIDE 0.9 % (FLUSH) 0.9 %
10 SYRINGE (ML) INJECTION
Status: DISCONTINUED | OUTPATIENT
Start: 2019-07-31 | End: 2019-07-31 | Stop reason: HOSPADM

## 2019-07-31 RX ORDER — SODIUM CHLORIDE 0.9 % (FLUSH) 0.9 %
10 SYRINGE (ML) INJECTION
OUTPATIENT
Start: 2019-07-31

## 2019-07-31 RX ORDER — HEPARIN 100 UNIT/ML
500 SYRINGE INTRAVENOUS
OUTPATIENT
Start: 2019-07-31

## 2019-07-31 RX ORDER — SODIUM CHLORIDE 0.9 % (FLUSH) 0.9 %
10 SYRINGE (ML) INJECTION
Status: CANCELLED | OUTPATIENT
Start: 2019-08-02

## 2019-07-31 RX ORDER — SODIUM CHLORIDE 0.9 % (FLUSH) 0.9 %
10 SYRINGE (ML) INJECTION
Status: COMPLETED | OUTPATIENT
Start: 2019-07-31 | End: 2019-07-31

## 2019-07-31 RX ORDER — HEPARIN 100 UNIT/ML
500 SYRINGE INTRAVENOUS
Status: CANCELLED | OUTPATIENT
Start: 2019-07-31

## 2019-07-31 RX ORDER — HEPARIN 100 UNIT/ML
500 SYRINGE INTRAVENOUS
Status: DISCONTINUED | OUTPATIENT
Start: 2019-07-31 | End: 2019-07-31 | Stop reason: HOSPADM

## 2019-07-31 RX ORDER — HEPARIN 100 UNIT/ML
500 SYRINGE INTRAVENOUS
Status: CANCELLED | OUTPATIENT
Start: 2019-08-01

## 2019-07-31 RX ORDER — PHENAZOPYRIDINE HYDROCHLORIDE 200 MG/1
TABLET, FILM COATED ORAL
Refills: 0 | COMMUNITY
Start: 2019-07-15 | End: 2019-09-04 | Stop reason: ALTCHOICE

## 2019-07-31 RX ORDER — SODIUM CHLORIDE 0.9 % (FLUSH) 0.9 %
10 SYRINGE (ML) INJECTION
Status: CANCELLED | OUTPATIENT
Start: 2019-07-31

## 2019-07-31 RX ORDER — HEPARIN 100 UNIT/ML
500 SYRINGE INTRAVENOUS
Status: CANCELLED | OUTPATIENT
Start: 2019-08-02

## 2019-07-31 RX ORDER — HEPARIN 100 UNIT/ML
500 SYRINGE INTRAVENOUS
Status: COMPLETED | OUTPATIENT
Start: 2019-07-31 | End: 2019-07-31

## 2019-07-31 RX ORDER — SODIUM CHLORIDE 0.9 % (FLUSH) 0.9 %
10 SYRINGE (ML) INJECTION
Status: CANCELLED | OUTPATIENT
Start: 2019-08-01

## 2019-07-31 RX ORDER — POTASSIUM CHLORIDE 750 MG/1
TABLET, EXTENDED RELEASE ORAL
COMMUNITY
Start: 2019-07-23 | End: 2019-09-04 | Stop reason: ALTCHOICE

## 2019-07-31 RX ADMIN — Medication 10 ML: at 10:07

## 2019-07-31 RX ADMIN — CARBOPLATIN 335 MG: 10 INJECTION, SOLUTION INTRAVENOUS at 02:07

## 2019-07-31 RX ADMIN — DEXAMETHASONE SODIUM PHOSPHATE: 4 INJECTION, SOLUTION INTRAMUSCULAR; INTRAVENOUS at 12:07

## 2019-07-31 RX ADMIN — ATEZOLIZUMAB 1200 MG: 1200 INJECTION, SOLUTION INTRAVENOUS at 01:07

## 2019-07-31 RX ADMIN — ETOPOSIDE 150 MG: 20 INJECTION INTRAVENOUS at 02:07

## 2019-07-31 RX ADMIN — APREPITANT 130 MG: 130 INJECTION, EMULSION INTRAVENOUS at 12:07

## 2019-07-31 RX ADMIN — HEPARIN 500 UNITS: 100 SYRINGE at 10:07

## 2019-07-31 NOTE — PROGRESS NOTES
PATIENT: Rosita Almodovar  MRN: [id number]  DATE: 7/31/2019      Diagnosis:   1. Small cell lung cancer, left    2. Antineoplastic chemotherapy induced anemia    3. YAKOV (acute kidney injury)        Chief Complaint: No chief complaint on file.      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  She was started on carboplatin, etoposide, and atezolizumab.  No focal neurologic deficits and thus WBRT was initially deferred.   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  See comment.  Comment:  The left lung biopsy shows a malignant proliferation of cells with increased nuclear cytoplasmic ratio, stippled chromatin pattern with small amount of cytoplasm. Some of cells show molding and crush artifact. The majority of the cells are relatively small in size, however there are a few areas with larger cells within increased amount of cytoplasm. The cells are arranged in sheets and nests with brisk mitotic activity and areas of necrosis. Immunohistochemical stains reveal the tumor cells to stain positively with cytokeratin AE1/AE3, synaptophysin, chromogranin and TTF-1. Immunohistochemical stain for p63 is negative within the tumor cells. Immunostain for cytokeratin 7 shows positive staining in the background lung but is negative within tumor cells. All stains have atisfactory positive negative controls. 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 Correlate clinically.        Subjective:    Interval History: Ms. Almodovar is here for extensive stage small cell lung cancer prior to cycle 3, which was delayed due to YAKOV, hypomag, hypok, and severe anemia requiring transfusion    She feels much better since getting IV fluids, magnesium, and blood.  Her  says her appetite has increased and she also has gained more energy to cook at home.  Denies fevers, chills, or cough.  Denies worsening numbness or tingling in hands or feet since her last chemo treatment.    Her  accompanies her at this visit.     Past Medical History:   Past Medical History:   Diagnosis Date    Acquired hypothyroidism 5/8/2019    Brain tumor     COPD (chronic obstructive pulmonary disease) 5/8/2019    Gastric ulcer     Hilar mass     Kidney cysts     left    Pleural effusion        Past Surgical HIstory:   Past Surgical History:   Procedure Laterality Date    ANGIOGRAM, CORONARY, WITH LEFT HEART CATHETERIZATION      endoscope      [id number] LEFT NECK Left 6/4/2019    Performed by Reggie Franklin Jr., MD at [facility] OR [address] [place] [address]    kidney cyst excision and drainage         Family History: No family history on file.    Social History:  reports that she quit smoking about 5 years ago. Her smoking use included cigarettes. She has a 54.00 pack-year smoking history. She has never used smokeless tobacco. She reports that she does not drink alcohol or use drugs.    Allergies:  Review of patient's allergies indicates:   Allergen Reactions    Potassium Nausea Only       Medications:  Current Outpatient Medications   Medication Sig Dispense Refill    albuterol (VENTOLIN HFA) 90 mcg/actuation inhaler Ventolin HFA 90 mcg/actuation aerosol inhaler      atenolol (TENORMIN) 50 MG tablet Take 50 mg by mouth once daily.  1    budesonide-formoterol 160-4.5 mcg (SYMBICORT) 160-4.5  mcg/actuation HFAA Inhale 2 puffs into the lungs every 12 (twelve) hours. Controller 1 Inhaler 11    clonazePAM (KLONOPIN) 0.5 MG tablet TAKE ONE TABLET BY MOUTH THREE TIMES DAILY AS NEEDED FOR ANXIETY OR for panic attacks  3    clopidogrel (PLAVIX) 75 mg tablet Take 75 mg by mouth once daily.  3    cyproheptadine (PERIACTIN) 4 mg tablet Take 1 tablet (4 mg total) by mouth 4 (four) times daily. 90 tablet 2    DECARA 50,000 unit capsule Take 50,000 Units by mouth every 7 days.  3    ergocalciferol (VITAMIN D2) 50,000 unit Cap Take 50,000 Units by mouth every 7 days. Tuesday      furosemide (LASIX) 20 MG tablet Take 20 mg by mouth once daily.       levothyroxine (SYNTHROID) 50 MCG tablet Take 50 mcg by mouth every morning.  3    lidocaine-prilocaine (EMLA) cream Apply to port site 1 hour prior to chemotherapy and cover with saran wrap 30 g 3    lisinopril (PRINIVIL,ZESTRIL) 5 MG tablet Take 5 mg by mouth once daily.       LORazepam (ATIVAN) 1 MG tablet Take 1 tablet (1 mg total) by mouth as needed for Anxiety (Take 1-2 tablets 30 minutes prior to procedure). 5 tablet 0    ondansetron (ZOFRAN-ODT) 8 MG TbDL Dissolve 1 tablet (8 mg total) under tongue every 8 (eight) hours as needed (nausea). 30 tablet 3    pantoprazole (PROTONIX) 40 MG tablet TAKE ONE TABLET BY MOUTH EVERY DAY FOR stomach  3    pravastatin (PRAVACHOL) 20 MG tablet Take 20 mg by mouth once daily.       predniSONE (DELTASONE) 20 MG tablet TAKE ONE TABLET BY MOUTH EVERY DAY FOR 3 DAYS AND REPEAT FOR FLARE OF LUNGS AS DIRECTED  2    sotalol (BETAPACE) 80 MG tablet TAKE ONE TABLET BY MOUTH TWICE DAILY FOR BLOOD PRESSURE AND HEART  1    tiotropium bromide 2.5 mcg/actuation Mist Inhale 5 mcg into the lungs once daily. Controller 4 g 5    traMADol (ULTRAM) 50 mg tablet Take 50 mg by mouth every 12 (twelve) hours as needed.   3     No current facility-administered medications for this visit.      Facility-Administered Medications Ordered in  Other Visits   Medication Dose Route Frequency Provider Last Rate Last Dose    alteplase injection 2 mg  2 mg Intra-Catheter PRN Stephan Atkinson MD   2 mg at 07/05/19 1408       Review of Systems   Constitutional: Positive for activity change, appetite change, fatigue and unexpected weight change. Negative for chills and fever.   HENT: Negative for nosebleeds.    Eyes: Negative for visual disturbance.   Respiratory: Positive for shortness of breath. Negative for cough.    Cardiovascular: Negative for chest pain and leg swelling.   Gastrointestinal: Negative for abdominal distention, abdominal pain, constipation, diarrhea and nausea.   Endocrine: Negative for cold intolerance and heat intolerance.   Genitourinary: Negative for dysuria.   Musculoskeletal: Positive for back pain (chronic unchanged).   Skin: Negative for color change and rash.   Neurological: Positive for dizziness, weakness, light-headedness and numbness.   Hematological: Negative for adenopathy.   Psychiatric/Behavioral: Negative for confusion.       ECOG Performance Status:  3  Objective:      Vitals:   There were no vitals filed for this visit.  BMI: There is no height or weight on file to calculate BMI.   Wt Readings from Last 10 Encounters:   07/24/19 51.4 kg (113 lb 5.1 oz)   07/08/19 54.1 kg (119 lb 3.2 oz)   07/03/19 51.2 kg (112 lb 14 oz)   06/21/19 53.1 kg (117 lb)   06/20/19 53.4 kg (117 lb 11.6 oz)   06/05/19 56 kg (123 lb 7.3 oz)   06/04/19 54.9 kg (121 lb)   05/30/19 54.9 kg (121 lb)   05/29/19 54.9 kg (121 lb)   05/29/19 54.9 kg (121 lb)         Physical Exam   Constitutional: She appears well-developed.   HENT:   Head: Normocephalic.   Eyes: Pupils are equal, round, and reactive to light. EOM are normal. No scleral icterus.   Neck: Normal range of motion. No tracheal deviation present.   Cardiovascular: Normal rate, regular rhythm and normal heart sounds. Exam reveals no gallop and no friction rub.   No murmur heard.  Pulmonary/Chest:  Effort normal. No respiratory distress. She has no wheezes. She has no rales.   Diminished breath sounds left worse than right.   Abdominal: Soft. Bowel sounds are normal. She exhibits no distension and no mass. There is no tenderness. There is no guarding.   Musculoskeletal: Normal range of motion. She exhibits no edema.   Lymphadenopathy:     She has no cervical adenopathy.   Neurological: She is alert.   Skin: Skin is warm and dry.       Laboratory Data:   Recent Results (from the past 24 hour(s))   Comprehensive metabolic panel    Collection Time: 07/31/19 10:27 AM   Result Value Ref Range    Sodium 136 136 - 145 mmol/L    Potassium 4.1 3.5 - 5.1 mmol/L    Chloride 100 95 - 110 mmol/L    CO2 29 23 - 29 mmol/L    Glucose 74 70 - 110 mg/dL    BUN, Bld 11 8 - 23 mg/dL    Creatinine 0.9 0.5 - 1.4 mg/dL    Calcium 8.2 (L) 8.7 - 10.5 mg/dL    Total Protein 5.5 (L) 6.0 - 8.4 g/dL    Albumin 2.4 (L) 3.5 - 5.2 g/dL    Total Bilirubin 0.3 0.1 - 1.0 mg/dL    Alkaline Phosphatase 343 (H) 55 - 135 U/L    AST 18 10 - 40 U/L    ALT <5 (L) 10 - 44 U/L    Anion Gap 7 (L) 8 - 16 mmol/L    eGFR if African American >60.0 >60 mL/min/1.73 m^2    eGFR if non African American >60.0 >60 mL/min/1.73 m^2   CBC auto differential    Collection Time: 07/31/19 10:27 AM   Result Value Ref Range    WBC 10.93 3.90 - 12.70 K/uL    RBC 3.04 (L) 4.00 - 5.40 M/uL    Hemoglobin 8.6 (L) 12.0 - 16.0 g/dL    Hematocrit 28.7 (L) 37.0 - 48.5 %    Mean Corpuscular Volume 94 82 - 98 fL    Mean Corpuscular Hemoglobin 28.3 27.0 - 31.0 pg    Mean Corpuscular Hemoglobin Conc 30.0 (L) 32.0 - 36.0 g/dL    RDW 18.2 (H) 11.5 - 14.5 %    Platelets 498 (H) 150 - 350 K/uL    MPV 9.4 9.2 - 12.9 fL    Immature Granulocytes 2.2 (H) 0.0 - 0.5 %    Gran # (ANC) 7.2 1.8 - 7.7 K/uL    Immature Grans (Abs) 0.24 (H) 0.00 - 0.04 K/uL    Lymph # 2.3 1.0 - 4.8 K/uL    Mono # 1.2 (H) 0.3 - 1.0 K/uL    Eos # 0.0 0.0 - 0.5 K/uL    Baso # 0.04 0.00 - 0.20 K/uL    nRBC 0 0 /100 WBC     Gran% 65.7 38.0 - 73.0 %    Lymph% 21.0 18.0 - 48.0 %    Mono% 10.7 4.0 - 15.0 %    Eosinophil% 0.0 0.0 - 8.0 %    Basophil% 0.4 0.0 - 1.9 %    Differential Method Automated    Magnesium    Collection Time: 07/31/19 10:27 AM   Result Value Ref Range    Magnesium 1.8 1.6 - 2.6 mg/dL           Imaging: MRI 7/17/19  CLINICAL HISTORY:  Brain mets, re-staging; Other specified disorders of brain    TECHNIQUE:  Sagittal and axial T1, axial T2, axial FLAIR, axial gradient, axial diffusion imaging of the whole brain pre-contrast with postcontrast axial T1 and axial spoiled gradient imaging reformatted in the coronal and sagittal planes.. 8 ml of Gadavist injected intravenously.    COMPARISON:  05/09/2019    FINDINGS:  6-7 mm enhancing lesion left cerebellum previously measuring 1.2 cm.  There is punctate enhancement associated with the previous ring enhancing lesion in the left parasagittal posterior frontal cortex.  There is no new or increased sized regions of enhancement to suggest definite new or worsening lesions.    Patchy and confluent T2 FLAIR regions of hyperintensities throughout the supratentorial which may represent posttherapy change or chronic microvascular ischemic change.  There is no restricted diffusion to suggest acute infarction.    Continued heterogeneous attenuation of the calvarium concerning for calvarial metastases    Induration and edema in the right parietal soft tissues which may represent cutaneous extension of osseous lesion versus superimposed inflammatory/infectious or posttraumatic process clinical correlation advised.    This report was flagged in Epic as abnormal.      Impression       Interval reduced sized enhancing lesions left cerebellum and left parasagittal frontal lobe concerning for evolving metastases.  No evidence for new lesion or increased size lesion to suggest worsening intracranial metastatic disease.    Continued heterogeneous marrow signal throughout the calvarium and  visualized cervical spine concerning for osseous metastases.    Interval development of a subtle area of edema signal within the right parietal soft tissues which may be sequela of traumatic injury with soft tissue inflammatory, infectious or extension of calvarial metastatic disease to be considered.  Clinical correlation advised.    T2 flair signal hyperintensity             Assessment:       1. Small cell lung cancer, left    2. Antineoplastic chemotherapy induced anemia    3. YAKOV (acute kidney injury)           Plan:       1. 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  While treating small cell lung cancer is the priority, typical approach to purely large cell neuroendocrine is similar to that of NSCLC.  Small cell lung cancer treatment overlaps with that of NSCLC with platinum and immunotherapy components.  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  Was started on carboplatin, etoposide and atezolizumab.  -Plan for carboplatin, etoposide, and atezolizumab.   --Proceed with cycle 3 today  --Imodium PRN for chemo induced diarrhea   -Continue with HH/PT at home  -s/p port placement 6/4/19  -Referred to radiation oncology for WBRT with her worsening hyponatremia. 7/17/19 MRI brain with improvement since chemotherapy  --Will return to radiation oncology for WBRT after she completes 4 cycles.  -Return to clinic 8/21/19 for cycle 4 evaluation  -Will scan CT C/A/P prior to cycle 4.  --Monitor TSH and HbA1c q4 cycles    2. Chemotherapy induced anemia -   -improved after blood transfusion    3. YAKOV - Resolved after IV fluids and blood last week.      Advance Care Planning I initiated the process of advance care planning today and explained the importance of this process to the patient.  Then the patient received detailed information about the importance of designating a Health Care Power of  (HCPOA). she was instructed to communicate with this person about  their wishes for future healthcare, should she become sick and lose decision-making capacity. The patient has previously appointed a HCPOA. After our discussion, the patient has decided to complete a HCPOA and has appointed her significant other and NAME:Edenilson Almodovar   I spent a total time of 16 minutes discussing this issue with the patient.     No orders of the defined types were placed in this encounter.        Stephan Atkinson MD  Hematology Oncology Fellow PGY6  Pager [phone]      Distress Screening Results: Psychosocial Distress screening score of Distress Score: 0 noted and reviewed. No intervention indicated.

## 2019-07-31 NOTE — PLAN OF CARE
Problem: Adult Inpatient Plan of Care  Goal: Plan of Care Review  Outcome: Ongoing (interventions implemented as appropriate)  Pt tolerated tecentriq, carbo, etoposide well.  No s/s of reaction. Vitals stable, NAD.         English

## 2019-07-31 NOTE — Clinical Note
1. 8/21/19 Labs cmp,cbc,type and screen, magnesium (clinic collect; draw from port) and then follow up with me and then cycle 4D1 carbo/etoposide  2. 8/22 C4D2  3. 8/23 C4D3.  Thanks -e

## 2019-07-31 NOTE — NURSING
Patient here for blood draw from port-accesses easily with good blood return-blood to lab-line flushed and left accessed for chemo today.

## 2019-07-31 NOTE — Clinical Note
Addendum: can we move labs to 8/20/19 and have ct chest abdomen pelvis done close to their home that same day? That way they can follow up with me to discuss results.  Thanks -e

## 2019-08-01 ENCOUNTER — INFUSION (OUTPATIENT)
Dept: INFUSION THERAPY | Facility: HOSPITAL | Age: 79
End: 2019-08-01
Attending: INTERNAL MEDICINE
Payer: MEDICARE

## 2019-08-01 VITALS
HEIGHT: 62 IN | RESPIRATION RATE: 18 BRPM | SYSTOLIC BLOOD PRESSURE: 103 MMHG | WEIGHT: 113.31 LBS | TEMPERATURE: 98 F | BODY MASS INDEX: 20.85 KG/M2 | HEART RATE: 68 BPM | DIASTOLIC BLOOD PRESSURE: 53 MMHG

## 2019-08-01 DIAGNOSIS — C34.92 SMALL CELL LUNG CANCER, LEFT: Primary | ICD-10-CM

## 2019-08-01 PROCEDURE — 63600175 PHARM REV CODE 636 W HCPCS: Performed by: INTERNAL MEDICINE

## 2019-08-01 PROCEDURE — A4216 STERILE WATER/SALINE, 10 ML: HCPCS | Performed by: INTERNAL MEDICINE

## 2019-08-01 PROCEDURE — 96413 CHEMO IV INFUSION 1 HR: CPT

## 2019-08-01 PROCEDURE — 25000003 PHARM REV CODE 250: Performed by: INTERNAL MEDICINE

## 2019-08-01 RX ORDER — SODIUM CHLORIDE 0.9 % (FLUSH) 0.9 %
10 SYRINGE (ML) INJECTION
Status: DISCONTINUED | OUTPATIENT
Start: 2019-08-01 | End: 2019-08-01 | Stop reason: HOSPADM

## 2019-08-01 RX ORDER — HEPARIN 100 UNIT/ML
500 SYRINGE INTRAVENOUS
Status: DISCONTINUED | OUTPATIENT
Start: 2019-08-01 | End: 2019-08-01 | Stop reason: HOSPADM

## 2019-08-01 RX ADMIN — HEPARIN 500 UNITS: 100 SYRINGE at 03:08

## 2019-08-01 RX ADMIN — Medication 10 ML: at 03:08

## 2019-08-01 RX ADMIN — SODIUM CHLORIDE: 9 INJECTION, SOLUTION INTRAVENOUS at 02:08

## 2019-08-01 RX ADMIN — ETOPOSIDE 150 MG: 20 INJECTION INTRAVENOUS at 02:08

## 2019-08-01 NOTE — PLAN OF CARE
Problem: Nausea and Vomiting (Chemotherapy Effects)  Goal: Fluid and Electrolyte Balance  Outcome: Ongoing (interventions implemented as appropriate)  Patient here for VP16.  Assessment completed and labs reviewed.  VSS.  No needs at this time. Chair reclined and blanket offered.   Will continue to monitor.

## 2019-08-01 NOTE — PLAN OF CARE
Problem: Adult Inpatient Plan of Care  Goal: Plan of Care Review  Outcome: Ongoing (interventions implemented as appropriate)  Tolerated infusion well.  No reactions noted.  No questions or concerns at this time.  Ambulated off unit in [facility].

## 2019-08-02 ENCOUNTER — INFUSION (OUTPATIENT)
Dept: INFUSION THERAPY | Facility: HOSPITAL | Age: 79
End: 2019-08-02
Attending: INTERNAL MEDICINE
Payer: MEDICARE

## 2019-08-02 VITALS
SYSTOLIC BLOOD PRESSURE: 117 MMHG | HEIGHT: 62 IN | WEIGHT: 113.31 LBS | DIASTOLIC BLOOD PRESSURE: 55 MMHG | BODY MASS INDEX: 20.85 KG/M2 | RESPIRATION RATE: 18 BRPM | TEMPERATURE: 98 F | HEART RATE: 60 BPM

## 2019-08-02 DIAGNOSIS — C34.92 SMALL CELL LUNG CANCER, LEFT: Primary | ICD-10-CM

## 2019-08-02 PROCEDURE — 96413 CHEMO IV INFUSION 1 HR: CPT

## 2019-08-02 PROCEDURE — 63600175 PHARM REV CODE 636 W HCPCS: Performed by: INTERNAL MEDICINE

## 2019-08-02 RX ORDER — HEPARIN 100 UNIT/ML
500 SYRINGE INTRAVENOUS
Status: DISCONTINUED | OUTPATIENT
Start: 2019-08-02 | End: 2019-08-02 | Stop reason: HOSPADM

## 2019-08-02 RX ORDER — SODIUM CHLORIDE 0.9 % (FLUSH) 0.9 %
10 SYRINGE (ML) INJECTION
Status: DISCONTINUED | OUTPATIENT
Start: 2019-08-02 | End: 2019-08-02 | Stop reason: HOSPADM

## 2019-08-02 RX ADMIN — HEPARIN 500 UNITS: 100 SYRINGE at 03:08

## 2019-08-02 RX ADMIN — SODIUM CHLORIDE: 9 INJECTION, SOLUTION INTRAVENOUS at 02:08

## 2019-08-02 RX ADMIN — ETOPOSIDE 150 MG: 20 INJECTION, SOLUTION, CONCENTRATE INTRAVENOUS at 02:08

## 2019-08-02 NOTE — PLAN OF CARE
Problem: Adult Inpatient Plan of Care  Goal: Plan of Care Review  Outcome: Ongoing (interventions implemented as appropriate)  Pt tolerated etoposide without complications. VSS. No s/s of reaction. Instructed to contact MD with any questions. PAC heparin locked and de-accessed. AVS given to patient.

## 2019-08-07 ENCOUNTER — NUTRITION (OUTPATIENT)
Dept: NUTRITION | Facility: CLINIC | Age: 79
End: 2019-08-07
Payer: MEDICARE

## 2019-08-07 VITALS — WEIGHT: 113.56 LBS | HEIGHT: 62 IN | BODY MASS INDEX: 20.9 KG/M2

## 2019-08-07 DIAGNOSIS — C34.90 SMALL CELL LUNG CANCER: Primary | ICD-10-CM

## 2019-08-07 DIAGNOSIS — R63.4 UNINTENTIONAL WEIGHT LOSS: ICD-10-CM

## 2019-08-07 DIAGNOSIS — Z00.8 NUTRITIONAL ASSESSMENT: ICD-10-CM

## 2019-08-07 PROCEDURE — 97803 MED NUTRITION INDIV SUBSEQ: CPT | Mod: PBBFAC | Performed by: DIETITIAN, REGISTERED

## 2019-08-07 PROCEDURE — 99999 PR PBB SHADOW E&M-EST. PATIENT-LVL III: CPT | Mod: PBBFAC,,,

## 2019-08-07 PROCEDURE — 99999 PR PBB SHADOW E&M-EST. PATIENT-LVL III: ICD-10-PCS | Mod: PBBFAC,,,

## 2019-08-07 PROCEDURE — 99213 OFFICE O/P EST LOW 20 MIN: CPT | Mod: PBBFAC

## 2019-08-07 NOTE — PROGRESS NOTES
"Referring Physician:Stephan Atkinson MD     Reason for visit:  Chief Complaint   Patient presents with    Cancer    Weight Loss    Nutrition Counseling      Initial Visit    :1940     Allergies Reviewed  Meds Reviewed    Anthropometrics  Weight:51.5 kg (113 lb 8.6 oz)  Height:5' 1.5" (1.562 m)  BMI:Body mass index is 21.11 kg/m².   IBW:   50 kg  +/-10%    Meds:  Outpatient Medications Prior to Visit   Medication Sig Dispense Refill    albuterol (VENTOLIN HFA) 90 mcg/actuation inhaler Ventolin HFA 90 mcg/actuation aerosol inhaler      atenolol (TENORMIN) 50 MG tablet Take 50 mg by mouth once daily.  1    budesonide-formoterol 160-4.5 mcg (SYMBICORT) 160-4.5 mcg/actuation HFAA Inhale 2 puffs into the lungs every 12 (twelve) hours. Controller 1 Inhaler 11    clonazePAM (KLONOPIN) 0.5 MG tablet TAKE ONE TABLET BY MOUTH THREE TIMES DAILY AS NEEDED FOR ANXIETY OR for panic attacks  3    cyproheptadine (PERIACTIN) 4 mg tablet Take 1 tablet (4 mg total) by mouth 4 (four) times daily. 90 tablet 2    DECARA 50,000 unit capsule Take 50,000 Units by mouth every 7 days.  3    ergocalciferol (VITAMIN D2) 50,000 unit Cap Take 50,000 Units by mouth every 7 days. Tuesday      furosemide (LASIX) 20 MG tablet Take 20 mg by mouth once daily.       levothyroxine (SYNTHROID) 50 MCG tablet Take 50 mcg by mouth every morning.  3    lidocaine-prilocaine (EMLA) cream Apply to port site 1 hour prior to chemotherapy and cover with saran wrap 30 g 3    lisinopril (PRINIVIL,ZESTRIL) 5 MG tablet Take 5 mg by mouth once daily.       LORazepam (ATIVAN) 1 MG tablet Take 1 tablet (1 mg total) by mouth as needed for Anxiety (Take 1-2 tablets 30 minutes prior to procedure). 5 tablet 0    ondansetron (ZOFRAN-ODT) 8 MG TbDL Dissolve 1 tablet (8 mg total) under tongue every 8 (eight) hours as needed (nausea). 30 tablet 3    pantoprazole (PROTONIX) 40 MG tablet TAKE ONE TABLET BY MOUTH EVERY DAY FOR stomach  3    phenazopyridine " "(PYRIDIUM) 200 MG tablet TAKE ONE TABLET BY MOUTH THREE TIMES DAILY FOR 3 DAYS  0    potassium chloride (KLOR-CON) 10 MEQ TbSR       pravastatin (PRAVACHOL) 20 MG tablet Take 20 mg by mouth once daily.       predniSONE (DELTASONE) 20 MG tablet TAKE ONE TABLET BY MOUTH EVERY DAY FOR 3 DAYS AND REPEAT FOR FLARE OF LUNGS AS DIRECTED  2    sotalol (BETAPACE) 80 MG tablet TAKE ONE TABLET BY MOUTH TWICE DAILY FOR BLOOD PRESSURE AND HEART  1    tiotropium bromide 2.5 mcg/actuation Mist Inhale 5 mcg into the lungs once daily. Controller 4 g 5    traMADol (ULTRAM) 50 mg tablet Take 50 mg by mouth every 12 (twelve) hours as needed.   3     Facility-Administered Medications Prior to Visit   Medication Dose Route Frequency Provider Last Rate Last Dose    alteplase injection 2 mg  2 mg Intra-Catheter PRN Stephan Atkinson MD   2 mg at 07/05/19 1408       Food/Drug Interactions Noted:  n/a    Vitamins/Supplements/Herbs:  Vit D    Labs:   7/31/19:  Cr  0.9   eGFR >60.0   Alb  2.4   Mg  1.8     Nutrition Prescription: 6133-0513 Kcals/day( 35-40 kcal/kg IBW),  60-75 g protein( 1.2-1.5 g/kg IBW)     Support System:    Pt and her  Varg[place]se present for encounter.  Pt's  is primary caregiver, who does grocery shopping and meal preparation as well as being very encouraging with pt to increase po intake.  Pt states daughter cooks and brings food over, which pt likes to eat for supper.    Diet Hx:   Pt and spouse saw oncology dietitian Elvira Bowers on 6/20/19 for unintentional weight loss/poor appetite.  Pt states she has been following recommendations made then, and pt is pleased to note today that her appetite has increased.  Pt states "I get hungry!" and eats a hearty breakfast every morning.  Periactin is on pt's med list; however, she is not taking it because it is too costly.  Is drinking an Ensure every other day; drinks 2% milk, apple juice and orange juice, Melon Powerade mixed 50/50 with water and water for " "hydration.  Snacks:  "dessert" ie lemon meringue pie, ice cream, mini eclairs.  Eats bananas for potassium.     Breakfast:   Waffles with lots of butter and a little syrup; breakfast bowl; hash browns with scrambled eggs; Cut[employer]'s pancake/egg hearty breakfast.  juice  Lunch:   Favorite is Rhian[employer]'s baked potato with margarine and cheese; Adalberto's; some type of frozen meal; PB&J sandwich or lunchmeat/ham sandwich.  Dinner:   Last night meatballs and red gravy.    Current activity level and/or physical limitations:    In wheelchair; uses walker at home    Motivation to make changes/anticipated barriers and/or expected adherence:    Continued high calorie diet adherence expected with return of pt's appetite.   Note:  Pt is due to begin Cycle 3 chemo    Nutrition-Focus Physical Findings:    Pt appears thin and endentulous.  Was very animated discussing her current diet regimen.  [place]      Assessment:   Reviewed medical history; dietitian notes; recent dx of YAKOV, hypoK, severe anemia requiring transfusion and note that pt improved after receiving IVF, magnesium and blood.  Today pt and spouse discussed pt's diet regimen including 3 meals with between meal snacks daily and pt's improved appetite.  Encouraged pt to continue eating/drinking whatever sounds good to her.  Pt and spouse verbalized understanding of recommendations made today and intent to adhere.    Nutrition Diagnosis:   Unintentional weight loss RT inadequate energy intake AEB cancer dx/chemo tx    Recommendations:  Continue high calorie, high protein diet as tolerated with 3 meals/day with between meal snacks    Strategies Implemented:    [place] Instant Breakfast + whole milk    Consultation Time:30 minutes.  Communicated with referring healthcare provider:  Consult note available in pt's Epic chart per MD discretion  Follow Up:  Pt and spouse provided with dietitian contact number and advised to call with questions or make future appointment if further " intervention needed.

## 2019-08-16 ENCOUNTER — TELEPHONE (OUTPATIENT)
Dept: HEMATOLOGY/ONCOLOGY | Facility: CLINIC | Age: 79
End: 2019-08-16

## 2019-08-16 NOTE — TELEPHONE ENCOUNTER
----- Message from Mabel Torres sent at 8/16/2019  9:22 AM CDT -----  Contact: Patient  Who is calling:: Pt  Provider treating:: Dr Atkinson  Current symptom:: Headaches  Are you currently receiving treatment for your diagnosis:: Yes  When was your last treatment:: 08/02  How long have you had the symptom:: 1 week  Communication preference:: [phone]    Additional Info:: Pt also wants to know if 08/20 CT is going to have any effect on 08/21 chemo.

## 2019-08-21 ENCOUNTER — INFUSION (OUTPATIENT)
Dept: INFUSION THERAPY | Facility: HOSPITAL | Age: 79
End: 2019-08-21
Attending: INTERNAL MEDICINE
Payer: MEDICARE

## 2019-08-21 ENCOUNTER — OFFICE VISIT (OUTPATIENT)
Dept: HEMATOLOGY/ONCOLOGY | Facility: CLINIC | Age: 79
End: 2019-08-21
Payer: MEDICARE

## 2019-08-21 VITALS
BODY MASS INDEX: 20.56 KG/M2 | TEMPERATURE: 98 F | DIASTOLIC BLOOD PRESSURE: 53 MMHG | HEIGHT: 62 IN | HEART RATE: 69 BPM | SYSTOLIC BLOOD PRESSURE: 92 MMHG | WEIGHT: 111.75 LBS | RESPIRATION RATE: 17 BRPM | OXYGEN SATURATION: 96 %

## 2019-08-21 VITALS
BODY MASS INDEX: 21.1 KG/M2 | DIASTOLIC BLOOD PRESSURE: 65 MMHG | HEART RATE: 66 BPM | SYSTOLIC BLOOD PRESSURE: 110 MMHG | HEIGHT: 61 IN | RESPIRATION RATE: 18 BRPM | WEIGHT: 111.75 LBS | TEMPERATURE: 98 F

## 2019-08-21 DIAGNOSIS — D89.89 OTHER SPECIFIED DISORDERS INVOLVING THE IMMUNE MECHANISM, NOT ELSEWHERE CLASSIFIED: ICD-10-CM

## 2019-08-21 DIAGNOSIS — C79.31 SECONDARY MALIGNANT NEOPLASM OF BRAIN: ICD-10-CM

## 2019-08-21 DIAGNOSIS — C34.92 SMALL CELL LUNG CANCER, LEFT: Primary | ICD-10-CM

## 2019-08-21 DIAGNOSIS — E83.42 HYPOMAGNESEMIA: ICD-10-CM

## 2019-08-21 DIAGNOSIS — C34.92 PRIMARY MALIGNANT NEOPLASM OF LEFT LUNG METASTATIC TO OTHER SITE: ICD-10-CM

## 2019-08-21 DIAGNOSIS — R79.89 OTHER SPECIFIED ABNORMAL FINDINGS OF BLOOD CHEMISTRY: ICD-10-CM

## 2019-08-21 PROCEDURE — 99999 PR PBB SHADOW E&M-EST. PATIENT-LVL V: CPT | Mod: PBBFAC,GC,, | Performed by: STUDENT IN AN ORGANIZED HEALTH CARE EDUCATION/TRAINING PROGRAM

## 2019-08-21 PROCEDURE — 99215 PR OFFICE/OUTPT VISIT, EST, LEVL V, 40-54 MIN: ICD-10-PCS | Mod: S$PBB,GC,, | Performed by: STUDENT IN AN ORGANIZED HEALTH CARE EDUCATION/TRAINING PROGRAM

## 2019-08-21 PROCEDURE — 96417 CHEMO IV INFUS EACH ADDL SEQ: CPT

## 2019-08-21 PROCEDURE — 99999 PR PBB SHADOW E&M-EST. PATIENT-LVL V: ICD-10-PCS | Mod: PBBFAC,GC,, | Performed by: STUDENT IN AN ORGANIZED HEALTH CARE EDUCATION/TRAINING PROGRAM

## 2019-08-21 PROCEDURE — 63600175 PHARM REV CODE 636 W HCPCS: Performed by: INTERNAL MEDICINE

## 2019-08-21 PROCEDURE — 96413 CHEMO IV INFUSION 1 HR: CPT

## 2019-08-21 PROCEDURE — 99215 OFFICE O/P EST HI 40 MIN: CPT | Mod: S$PBB,GC,, | Performed by: STUDENT IN AN ORGANIZED HEALTH CARE EDUCATION/TRAINING PROGRAM

## 2019-08-21 PROCEDURE — 99215 OFFICE O/P EST HI 40 MIN: CPT | Mod: PBBFAC,25 | Performed by: STUDENT IN AN ORGANIZED HEALTH CARE EDUCATION/TRAINING PROGRAM

## 2019-08-21 PROCEDURE — 96367 TX/PROPH/DG ADDL SEQ IV INF: CPT

## 2019-08-21 RX ORDER — ONDANSETRON 8 MG/1
8 TABLET, ORALLY DISINTEGRATING ORAL EVERY 8 HOURS PRN
Qty: 30 TABLET | Refills: 3 | Status: SHIPPED | OUTPATIENT
Start: 2019-08-21

## 2019-08-21 RX ORDER — HEPARIN 100 UNIT/ML
500 SYRINGE INTRAVENOUS
Status: DISCONTINUED | OUTPATIENT
Start: 2019-08-21 | End: 2019-08-21 | Stop reason: HOSPADM

## 2019-08-21 RX ORDER — HEPARIN 100 UNIT/ML
500 SYRINGE INTRAVENOUS
Status: CANCELLED | OUTPATIENT
Start: 2019-08-23

## 2019-08-21 RX ORDER — HEPARIN 100 UNIT/ML
500 SYRINGE INTRAVENOUS
Status: CANCELLED | OUTPATIENT
Start: 2019-08-21

## 2019-08-21 RX ORDER — SODIUM CHLORIDE 0.9 % (FLUSH) 0.9 %
10 SYRINGE (ML) INJECTION
Status: DISCONTINUED | OUTPATIENT
Start: 2019-08-21 | End: 2019-08-21 | Stop reason: HOSPADM

## 2019-08-21 RX ORDER — SODIUM CHLORIDE 0.9 % (FLUSH) 0.9 %
10 SYRINGE (ML) INJECTION
Status: CANCELLED | OUTPATIENT
Start: 2019-08-22

## 2019-08-21 RX ORDER — SODIUM CHLORIDE 0.9 % (FLUSH) 0.9 %
10 SYRINGE (ML) INJECTION
Status: CANCELLED | OUTPATIENT
Start: 2019-08-23

## 2019-08-21 RX ORDER — SODIUM CHLORIDE 0.9 % (FLUSH) 0.9 %
10 SYRINGE (ML) INJECTION
Status: CANCELLED | OUTPATIENT
Start: 2019-08-21

## 2019-08-21 RX ORDER — HEPARIN 100 UNIT/ML
500 SYRINGE INTRAVENOUS
Status: CANCELLED | OUTPATIENT
Start: 2019-08-22

## 2019-08-21 RX ADMIN — HEPARIN 500 UNITS: 100 SYRINGE at 04:08

## 2019-08-21 RX ADMIN — APREPITANT 130 MG: 130 INJECTION, EMULSION INTRAVENOUS at 01:08

## 2019-08-21 RX ADMIN — ATEZOLIZUMAB 1200 MG: 1200 INJECTION, SOLUTION INTRAVENOUS at 02:08

## 2019-08-21 RX ADMIN — DEXAMETHASONE SODIUM PHOSPHATE: 4 INJECTION, SOLUTION INTRA-ARTICULAR; INTRALESIONAL; INTRAMUSCULAR; INTRAVENOUS; SOFT TISSUE at 01:08

## 2019-08-21 RX ADMIN — ETOPOSIDE 150 MG: 20 INJECTION INTRAVENOUS at 02:08

## 2019-08-21 RX ADMIN — CARBOPLATIN 300 MG: 10 INJECTION, SOLUTION INTRAVENOUS at 03:08

## 2019-08-21 NOTE — Clinical Note
1. Follow up 9/11/19 with cmp, cbc, tsh, hemoglobin a1c.  2. Schedule chemo chair after appointment for atezolizumab.  Thanks -e

## 2019-08-21 NOTE — PLAN OF CARE
Problem: Adult Inpatient Plan of Care  Goal: Plan of Care Review  Did well with chemo today Wanted port out.

## 2019-08-21 NOTE — PROGRESS NOTES
PATIENT: Rosita Almodovar  MRN: [id number]  DATE: 8/21/2019      Diagnosis:   1. Small cell lung cancer, left    2. 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  She was started on carboplatin, etoposide, and atezolizumab.  No focal neurologic deficits and thus WBRT was initially deferred.   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  See comment.  Comment:  The left lung biopsy shows a malignant proliferation of cells with increased nuclear cytoplasmic ratio, stippled chromatin pattern with small amount of cytoplasm. Some of cells show molding and crush artifact. The majority of the cells are relatively small in size, however there are a few areas with larger cells within increased amount of cytoplasm. The cells are arranged in sheets and nests with brisk mitotic activity and areas of necrosis. Immunohistochemical stains reveal the tumor cells to stain positively with cytokeratin AE1/AE3, synaptophysin, chromogranin and TTF-1. Immunohistochemical stain for p63 is negative within the tumor cells. Immunostain for cytokeratin 7 shows positive staining in the background lung but is negative within tumor cells. All stains have atisfactory positive negative controls. 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 Correlate clinically.        Subjective:    Interval History: Ms. Almodovar is here for extensive stage small cell lung cancer prior to cycle 4.    No issues since cycle 3 except for mild headaches.  Denies fevers, chills, chest pain, worsening shortness of breath.  She has intermittent nausea and needs refills.  Denies any diarrhea.  Appetite is still low.    Her  accompanies her at this visit.     Past Medical History:   Past Medical History:   Diagnosis Date    Acquired hypothyroidism 5/8/2019    Brain tumor     COPD (chronic obstructive pulmonary disease) 5/8/2019    Gastric ulcer     Hilar mass     Kidney cysts     left    Pleural effusion        Past Surgical HIstory:   Past Surgical History:   Procedure Laterality Date    ANGIOGRAM, CORONARY, WITH LEFT HEART CATHETERIZATION      endoscope      [id number]-[id number]-N-CATH LEFT NECK Left 6/4/2019    Performed by Reggie Franklin Jr., MD at [facility] OR [address] [place] [address]    kidney cyst excision and drainage         Family History: No family history on file.    Social History:  reports that she quit smoking about 5 years ago. Her smoking use included cigarettes. She has a 54.00 pack-year smoking history. She has never used smokeless tobacco. She reports that she does not drink alcohol or use drugs.    Allergies:  Review of patient's allergies indicates:   Allergen Reactions    Potassium Nausea Only       Medications:  Current Outpatient Medications   Medication Sig Dispense Refill    albuterol (VENTOLIN HFA) 90 mcg/actuation inhaler Ventolin HFA 90 mcg/actuation aerosol inhaler      atenolol (TENORMIN) 50 MG tablet Take 50 mg by mouth once daily.  1    budesonide-formoterol 160-4.5 mcg (SYMBICORT) 160-4.5 mcg/actuation HFAA Inhale 2 puffs into the lungs every 12 (twelve) hours. Controller 1 Inhaler 11    clonazePAM (KLONOPIN) 0.5 MG tablet TAKE ONE TABLET BY MOUTH THREE TIMES DAILY AS  NEEDED FOR ANXIETY OR for panic attacks  3    cyproheptadine (PERIACTIN) 4 mg tablet Take 1 tablet (4 mg total) by mouth 4 (four) times daily. 90 tablet 2    DECARA 50,000 unit capsule Take 50,000 Units by mouth every 7 days.  3    ergocalciferol (VITAMIN D2) 50,000 unit Cap Take 50,000 Units by mouth every 7 days. Tuesday      furosemide (LASIX) 20 MG tablet Take 20 mg by mouth once daily.       levothyroxine (SYNTHROID) 50 MCG tablet Take 50 mcg by mouth every morning.  3    lidocaine-prilocaine (EMLA) cream Apply to port site 1 hour prior to chemotherapy and cover with saran wrap 30 g 3    lisinopril (PRINIVIL,ZESTRIL) 5 MG tablet Take 5 mg by mouth once daily.       LORazepam (ATIVAN) 1 MG tablet Take 1 tablet (1 mg total) by mouth as needed for Anxiety (Take 1-2 tablets 30 minutes prior to procedure). 5 tablet 0    ondansetron (ZOFRAN-ODT) 8 MG TbDL Dissolve 1 tablet (8 mg total) under tongue every 8 (eight) hours as needed (nausea). 30 tablet 3    pantoprazole (PROTONIX) 40 MG tablet TAKE ONE TABLET BY MOUTH EVERY DAY FOR stomach  3    phenazopyridine (PYRIDIUM) 200 MG tablet TAKE ONE TABLET BY MOUTH THREE TIMES DAILY FOR 3 DAYS  0    potassium chloride (KLOR-CON) 10 MEQ TbSR       pravastatin (PRAVACHOL) 20 MG tablet Take 20 mg by mouth once daily.       predniSONE (DELTASONE) 20 MG tablet TAKE ONE TABLET BY MOUTH EVERY DAY FOR 3 DAYS AND REPEAT FOR FLARE OF LUNGS AS DIRECTED  2    sotalol (BETAPACE) 80 MG tablet TAKE ONE TABLET BY MOUTH TWICE DAILY FOR BLOOD PRESSURE AND HEART  1    tiotropium bromide 2.5 mcg/actuation Mist Inhale 5 mcg into the lungs once daily. Controller 4 g 5    traMADol (ULTRAM) 50 mg tablet Take 50 mg by mouth every 12 (twelve) hours as needed.   3     No current facility-administered medications for this visit.      Facility-Administered Medications Ordered in Other Visits   Medication Dose Route Frequency Provider Last Rate Last Dose    alteplase injection 2 mg  2  "mg Intra-Catheter PRN Stephan Atkinson MD   2 mg at 07/05/19 1408       Review of Systems   Constitutional: Positive for activity change, appetite change, fatigue and unexpected weight change. Negative for chills and fever.   HENT: Negative for nosebleeds.    Eyes: Negative for visual disturbance.   Respiratory: Positive for shortness of breath. Negative for cough.    Cardiovascular: Negative for chest pain and leg swelling.   Gastrointestinal: Positive for nausea. Negative for abdominal distention, abdominal pain, constipation and diarrhea.   Endocrine: Negative for cold intolerance and heat intolerance.   Genitourinary: Negative for dysuria.   Musculoskeletal: Positive for back pain (chronic unchanged).   Skin: Negative for color change and rash.   Neurological: Positive for dizziness, weakness, light-headedness and numbness.   Hematological: Negative for adenopathy.   Psychiatric/Behavioral: Negative for confusion.       ECOG Performance Status:  3  Objective:      Vitals:   Vitals:    08/21/19 1048   BP: (!) 92/53   Pulse: 69   Resp: 17   Temp: 98.2 °F (36.8 °C)   SpO2: 96%   Weight: 50.7 kg (111 lb 12.4 oz)   Height: 5' 1.5" (1.562 m)     BMI: Body mass index is 20.78 kg/m².   Wt Readings from Last 10 Encounters:   08/07/19 51.5 kg (113 lb 8.6 oz)   08/02/19 51.4 kg (113 lb 5.1 oz)   08/01/19 51.4 kg (113 lb 5.1 oz)   07/24/19 51.4 kg (113 lb 5.1 oz)   07/08/19 54.1 kg (119 lb 3.2 oz)   07/03/19 51.2 kg (112 lb 14 oz)   06/21/19 53.1 kg (117 lb)   06/20/19 53.4 kg (117 lb 11.6 oz)   06/05/19 56 kg (123 lb 7.3 oz)   06/04/19 54.9 kg (121 lb)         Physical Exam   Constitutional: She appears well-developed.   HENT:   Head: Normocephalic.   Eyes: Pupils are equal, round, and reactive to light. EOM are normal. No scleral icterus.   Neck: Normal range of motion. No tracheal deviation present.   Cardiovascular: Normal rate, regular rhythm and normal heart sounds. Exam reveals no gallop and no friction rub.   No murmur " heard.  Pulmonary/Chest: Effort normal. No respiratory distress. She has no wheezes. She has no rales.   Diminished breath sounds left worse than right.   Abdominal: Soft. Bowel sounds are normal. She exhibits no distension and no mass. There is no tenderness. There is no guarding.   Musculoskeletal: Normal range of motion. She exhibits no edema.   Lymphadenopathy:     She has no cervical adenopathy.   Neurological: She is alert.   Skin: Skin is warm and dry.       Laboratory Data:     Recent Results (from the past 168 hour(s))   Comprehensive metabolic panel    Collection Time: 08/20/19  8:11 AM   Result Value Ref Range    Sodium 131 (L) 136 - 145 mmol/L    Potassium 3.4 (L) 3.5 - 5.1 mmol/L    Chloride 92 (L) 101 - 111 mmol/L    CO2 31 (H) 23 - 29 mmol/L    Glucose 81 74 - 118 mg/dL    BUN, Bld 10 8 - 23 mg/dL    Creatinine 1.1 0.5 - 1.4 mg/dL    Calcium 8.1 (L) 8.6 - 10.0 mg/dL    Total Protein 6.2 6.0 - 8.4 g/dL    Albumin 2.9 (L) 3.5 - 5.2 g/dL    Total Bilirubin 0.3 0.3 - 1.2 mg/dL    Alkaline Phosphatase 209 (H) 38 - 126 U/L    AST 19 15 - 41 U/L    ALT 7 (L) 14 - 54 U/L    Anion Gap 8 8 - 16 mmol/L    eGFR if African American 56.0 (A) >60 mL/min/1.73 m^2    eGFR if non  48.5 (A) >60 mL/min/1.73 m^2   CBC auto differential    Collection Time: 08/20/19  8:11 AM   Result Value Ref Range    WBC 5.50 3.90 - 12.70 K/uL    RBC 2.78 (L) 4.00 - 5.40 M/uL    Hemoglobin 8.0 (L) 12.0 - 16.0 g/dL    Hematocrit 24.8 (L) 37.0 - 48.5 %    Mean Corpuscular Volume 89 82 - 98 fL    Mean Corpuscular Hemoglobin 28.9 27.0 - 31.0 pg    Mean Corpuscular Hemoglobin Conc 32.5 32.0 - 36.0 g/dL    RDW 18.6 (H) 11.5 - 14.5 %    Platelets 624 (H) 150 - 350 K/uL    MPV 7.4 (L) 9.2 - 12.9 fL    Lymph # CANCELED 1.0 - 4.8 K/uL    Mono # CANCELED 0.3 - 1.0 K/uL    Eos # CANCELED 0.0 - 0.5 K/uL    Baso # CANCELED 0.00 - 0.20 K/uL    Gran% 36.0 (L) 38.0 - 73.0 %    Lymph% 36.0 18.0 - 48.0 %    Mono% 9.0 4.0 - 15.0 %     Eosinophil% 2.0 0.0 - 8.0 %    Basophil% 0.0 0.0 - 1.9 %    Bands 9.0 %    Metamyelocytes 3.0 %    Myelocytes 5.0 %    Platelet Estimate Increased (A)     Aniso Slight     Toxic Granulation Present     Large/Giant Platelets Present     Differential Method Manual    Magnesium    Collection Time: 08/20/19  8:11 AM   Result Value Ref Range    Magnesium 1.5 (L) 1.6 - 2.6 mg/dL   Type & Screen    Collection Time: 08/20/19  8:30 AM   Result Value Ref Range    ABO O     Rh Type POS     Indirect Idalmis GEL NEG      Imaging: CT chest abdomen pelvis 8/20/19  FINDINGS:  Mediastinum, hilar and axillary regions:    A central venous Port-A-Cath is noted entering from the left subclavian region with the distal tip of the Port-A-Cath in the region of the left brachiocephalic vein.    There is mediastinal shift to the left.  Calcifications are noted in the aortic arch and coronary arteries.    There is a decrease in the size of the mass in the left pulmonary hilum with postobstructive collapse of the left lower lobe.    There is a moderate left-sided effusion and no effusion on the right.  There is no pericardial effusion.    Lungs:    There is complete collapse of the left lower lobe.    A 2 mm nodule is noted in the lateral aspect of the right upper lobe as seen on axial image 21 and series 3.    There is a 2 mm noncalcified nodule in the lateral aspect of the right upper lobe as seen on axial image 25 and series 3.    Abdomen and pelvis:    The liver, spleen and pancreas enhance normally.  There are calculi in the dependent portion of the gallbladder.  There is a 2 cm mass involving the left adrenal gland.  The right adrenal gland is normal.    There are cysts in the kidneys bilaterally.  Calcifications are noted in the abdominal aorta and its branch vessels.  The urinary bladder is unremarkable.  There are phleboliths in the pelvis.    A moderate amount of fecal material is noted in the colon and there is scattered fecal  material in the colon.  There is no free fluid or lymphadenopathy in the abdomen or pelvis.    Delayed images:    There is contrast excretion into both renal pelvocaliceal systems and no appreciable contrast accumulation in the urinary bladder.    Osseous structures:    There is a compression deformity along the inferior endplate of the T12 vertebra and a prominent Schmorl's node defect along the inferior endplate of the T11 vertebra.    Spondylotic changes are noted throughout the thoracic and lumbar spine.  There is asymmetrical sclerosis involving the medial aspects of the right superior and inferior pubic rami.    Dextroscoliosis is noted of the lower thoracic and lumbar spine.  There is narrowing of the disc spaces throughout the lower thoracic and lumbar spine with vacuum disc phenomenon noted.      Impression       Mass in the left pulmonary hilum with postobstructive collapse of the left lower lobe and a moderate left-sided pleural effusion.    Central venous Port-A-Cath in place.    Small nodules in the right upper lobe.    Cholelithiasis.    Cysts in the kidneys bilaterally.    2 cm mass involving the left adrenal gland.    Mediastinal shift to the left.    Vascular calcifications in the abdomen and pelvis.                 Assessment:       1. Small cell lung cancer, left    2. Secondary malignant neoplasm of brain           Plan:       1. 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  While treating small cell lung cancer is the priority, typical approach to purely large cell neuroendocrine is similar to that of NSCLC.  Small cell lung cancer treatment overlaps with that of NSCLC with platinum and immunotherapy components.  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  Was started on carboplatin, etoposide and atezolizumab.  S/p 3 cycles, CT imaging 8/20/2019 showed interval improvement.  -Plan for carboplatin, etoposide, and atezolizumab.   --Proceed with  cycle 4 today  --Will add fluids to day 2 and 3.  --Imodium PRN for chemo induced diarrhea   -Continue with HH/PT at home  -s/p port placement 6/4/19  -Referred to radiation oncology for WBRT with her worsening hyponatremia. 7/17/19 MRI brain with improvement since chemotherapy  --Will return to radiation oncology for WBRT after she completes 4 cycles.  -Return to clinic 9/11/19 for maintenance atezolizumab  -Will need follow up with Dr. Quintana and possibly a repeat MRI prior to starting brain radiation  --Monitor TSH and HbA1c q4 cycles      Advance Care Planning I initiated the process of advance care planning at prior visit and explained the importance of this process to the patient.  Then the patient received detailed information about the importance of designating a Health Care Power of  (HCPOA). she was instructed to communicate with this person about their wishes for future healthcare, should she become sick and lose decision-making capacity. The patient has previously appointed a HCPOA. After our discussion, the patient has decided to complete a HCPOA and has appointed her significant other and NAME:Edenilson Almodovar   I spent a total time of 16 minutes discussing this issue with the patient.     No orders of the defined types were placed in this encounter.        Stephan Atkinson MD  Hematology Oncology Fellow PGY6  Pager [phone]    Distress Screening Results: Psychosocial Distress screening score of Distress Score: 0 noted and reviewed. No intervention indicated.           ATTENDING NOTE, ONCOLOGY CLINIC    As above.  Patient seen and examined, chart reviewed.  Appears comfortable, in NAD.  Lungs are clear to auscultation.  Abdomen is soft, nontender.  Labs reviewed.    PLAN  She will proceed with cycle 4 of chemotherapy plus atezolizumab today.  She will be referred for XRT.  RTC 3 weeks to begin maintenance immunotherapy.    Bjorn Rodriguez MD

## 2019-08-22 ENCOUNTER — INFUSION (OUTPATIENT)
Dept: INFUSION THERAPY | Facility: HOSPITAL | Age: 79
End: 2019-08-22
Attending: INTERNAL MEDICINE
Payer: MEDICARE

## 2019-08-22 ENCOUNTER — TELEPHONE (OUTPATIENT)
Dept: HEMATOLOGY/ONCOLOGY | Facility: CLINIC | Age: 79
End: 2019-08-22

## 2019-08-22 VITALS
DIASTOLIC BLOOD PRESSURE: 56 MMHG | HEART RATE: 65 BPM | TEMPERATURE: 99 F | HEIGHT: 61 IN | SYSTOLIC BLOOD PRESSURE: 114 MMHG | BODY MASS INDEX: 21.1 KG/M2 | RESPIRATION RATE: 18 BRPM | WEIGHT: 111.75 LBS

## 2019-08-22 DIAGNOSIS — E83.42 HYPOMAGNESEMIA: ICD-10-CM

## 2019-08-22 DIAGNOSIS — E86.0 DEHYDRATION: ICD-10-CM

## 2019-08-22 DIAGNOSIS — C34.92 SMALL CELL LUNG CANCER, LEFT: Primary | ICD-10-CM

## 2019-08-22 PROCEDURE — 96367 TX/PROPH/DG ADDL SEQ IV INF: CPT

## 2019-08-22 PROCEDURE — 96366 THER/PROPH/DIAG IV INF ADDON: CPT

## 2019-08-22 PROCEDURE — 63600175 PHARM REV CODE 636 W HCPCS: Performed by: STUDENT IN AN ORGANIZED HEALTH CARE EDUCATION/TRAINING PROGRAM

## 2019-08-22 PROCEDURE — 96413 CHEMO IV INFUSION 1 HR: CPT

## 2019-08-22 PROCEDURE — 96361 HYDRATE IV INFUSION ADD-ON: CPT

## 2019-08-22 PROCEDURE — 96365 THER/PROPH/DIAG IV INF INIT: CPT

## 2019-08-22 PROCEDURE — 63600175 PHARM REV CODE 636 W HCPCS: Performed by: INTERNAL MEDICINE

## 2019-08-22 RX ORDER — MAGNESIUM SULFATE HEPTAHYDRATE 40 MG/ML
2 INJECTION, SOLUTION INTRAVENOUS ONCE
Status: COMPLETED | OUTPATIENT
Start: 2019-08-22 | End: 2019-08-22

## 2019-08-22 RX ADMIN — ETOPOSIDE 150 MG: 20 INJECTION INTRAVENOUS at 03:08

## 2019-08-22 RX ADMIN — SODIUM CHLORIDE 500 ML: 0.9 INJECTION, SOLUTION INTRAVENOUS at 02:08

## 2019-08-22 RX ADMIN — MAGNESIUM SULFATE IN WATER 2 G: 40 INJECTION, SOLUTION INTRAVENOUS at 02:08

## 2019-08-22 NOTE — PLAN OF CARE
Problem: Adult Inpatient Plan of Care  Goal: Plan of Care Review  Took chemo today. No complaints.

## 2019-08-23 ENCOUNTER — INFUSION (OUTPATIENT)
Dept: INFUSION THERAPY | Facility: HOSPITAL | Age: 79
End: 2019-08-23
Attending: INTERNAL MEDICINE
Payer: MEDICARE

## 2019-08-23 VITALS
SYSTOLIC BLOOD PRESSURE: 118 MMHG | DIASTOLIC BLOOD PRESSURE: 56 MMHG | TEMPERATURE: 98 F | HEART RATE: 65 BPM | RESPIRATION RATE: 18 BRPM

## 2019-08-23 DIAGNOSIS — C34.92 SMALL CELL LUNG CANCER, LEFT: Primary | ICD-10-CM

## 2019-08-23 DIAGNOSIS — E86.0 DEHYDRATION: ICD-10-CM

## 2019-08-23 DIAGNOSIS — E83.42 HYPOMAGNESEMIA: ICD-10-CM

## 2019-08-23 PROCEDURE — 63600175 PHARM REV CODE 636 W HCPCS: Performed by: STUDENT IN AN ORGANIZED HEALTH CARE EDUCATION/TRAINING PROGRAM

## 2019-08-23 PROCEDURE — 63600175 PHARM REV CODE 636 W HCPCS: Performed by: INTERNAL MEDICINE

## 2019-08-23 PROCEDURE — 96361 HYDRATE IV INFUSION ADD-ON: CPT

## 2019-08-23 PROCEDURE — 96413 CHEMO IV INFUSION 1 HR: CPT

## 2019-08-23 RX ORDER — SODIUM CHLORIDE 0.9 % (FLUSH) 0.9 %
10 SYRINGE (ML) INJECTION
Status: DISCONTINUED | OUTPATIENT
Start: 2019-08-23 | End: 2019-08-23 | Stop reason: HOSPADM

## 2019-08-23 RX ORDER — HEPARIN 100 UNIT/ML
500 SYRINGE INTRAVENOUS
Status: DISCONTINUED | OUTPATIENT
Start: 2019-08-23 | End: 2019-08-23 | Stop reason: HOSPADM

## 2019-08-23 RX ADMIN — ETOPOSIDE 150 MG: 20 INJECTION, SOLUTION, CONCENTRATE INTRAVENOUS at 02:08

## 2019-08-23 RX ADMIN — HEPARIN 500 UNITS: 100 SYRINGE at 03:08

## 2019-08-23 RX ADMIN — SODIUM CHLORIDE 500 ML: 9 INJECTION, SOLUTION INTRAVENOUS at 02:08

## 2019-08-23 NOTE — PLAN OF CARE
Problem: Adult Inpatient Plan of Care  Goal: Plan of Care Review  Outcome: Ongoing (interventions implemented as appropriate)  1605:  Patient tolerated -16 infusion well, NAD noted, denies any new issues.  Port flushed per protocol and de-accessed.  AVS given to patient.  Released in stable condition, via wheelchair, accompanied by family member.

## 2019-09-04 PROBLEM — D70.9 NEUTROPENIC: Status: ACTIVE | Noted: 2019-09-04

## 2019-09-05 ENCOUNTER — TELEPHONE (OUTPATIENT)
Dept: HEMATOLOGY/ONCOLOGY | Facility: CLINIC | Age: 79
End: 2019-09-05

## 2019-09-05 DIAGNOSIS — D64.81 ANTINEOPLASTIC CHEMOTHERAPY INDUCED ANEMIA: Primary | ICD-10-CM

## 2019-09-05 DIAGNOSIS — T45.1X5A ANTINEOPLASTIC CHEMOTHERAPY INDUCED ANEMIA: Primary | ICD-10-CM

## 2019-09-05 PROBLEM — Z71.3 ENCOUNTER FOR DIETARY CONSULTATION: Chronic | Status: ACTIVE | Noted: 2019-09-05

## 2019-09-05 NOTE — TELEPHONE ENCOUNTER
Returned call spoke with gurpreet and gave him the apt info for tomorrow for the patient to have labs at 8am and see dr. Atkinson at 830am.   He stated he would give the patient the info.   I told him that if the patient was not discharged for some reason today to let us know and we would reschedule her to Monday.  He voiced appreciation

## 2019-09-05 NOTE — TELEPHONE ENCOUNTER
----- Message from Stephan Atkinson MD sent at 9/5/2019 12:01 PM CDT -----  Contact: Erl [facility]     I just emailed ben to open up an 830 slot for me tomorrow  I think she's at lunch though. But let's try to get her labs 8AM cbc only.    If she's not being discharged today then we'll have to let ben to open up a different slot next Monday or Tuesday    Thanks  -e  ----- Message -----  From: Pinky Mckinney  Sent: 9/5/2019  11:00 AM  To: Stephan Atkinson MD    Please advise   ----- Message -----  From: Katherine Dos Santos  Sent: 9/5/2019  10:50 AM  To: Staci Eaton [facility] called to see if the pt can be seen before 9/11 Per NP Jameson Pt will be discharged 9/5 Needs a early Hospital F/U   Callback [phone]   Thank You  EDWIN Dos Santos

## 2019-09-06 ENCOUNTER — LAB VISIT (OUTPATIENT)
Dept: LAB | Facility: HOSPITAL | Age: 79
End: 2019-09-06
Payer: MEDICARE

## 2019-09-06 ENCOUNTER — OFFICE VISIT (OUTPATIENT)
Dept: HEMATOLOGY/ONCOLOGY | Facility: CLINIC | Age: 79
End: 2019-09-06
Payer: MEDICARE

## 2019-09-06 VITALS
WEIGHT: 110.44 LBS | TEMPERATURE: 98 F | HEIGHT: 62 IN | DIASTOLIC BLOOD PRESSURE: 81 MMHG | RESPIRATION RATE: 18 BRPM | HEART RATE: 109 BPM | SYSTOLIC BLOOD PRESSURE: 141 MMHG | OXYGEN SATURATION: 95 % | BODY MASS INDEX: 20.32 KG/M2

## 2019-09-06 DIAGNOSIS — C79.31 SECONDARY MALIGNANT NEOPLASM OF BRAIN: ICD-10-CM

## 2019-09-06 DIAGNOSIS — T45.1X5A ANTINEOPLASTIC CHEMOTHERAPY INDUCED ANEMIA: ICD-10-CM

## 2019-09-06 DIAGNOSIS — C34.92 SMALL CELL LUNG CANCER, LEFT: ICD-10-CM

## 2019-09-06 DIAGNOSIS — D64.81 ANTINEOPLASTIC CHEMOTHERAPY INDUCED ANEMIA: Primary | ICD-10-CM

## 2019-09-06 DIAGNOSIS — T45.1X5A ANTINEOPLASTIC CHEMOTHERAPY INDUCED ANEMIA: Primary | ICD-10-CM

## 2019-09-06 DIAGNOSIS — D64.81 ANTINEOPLASTIC CHEMOTHERAPY INDUCED ANEMIA: ICD-10-CM

## 2019-09-06 LAB
ANISOCYTOSIS BLD QL SMEAR: SLIGHT
BASOPHILS # BLD AUTO: 0.01 K/UL (ref 0–0.2)
BASOPHILS NFR BLD: 0.2 % (ref 0–1.9)
DIFFERENTIAL METHOD: ABNORMAL
EOSINOPHIL # BLD AUTO: 0 K/UL (ref 0–0.5)
EOSINOPHIL NFR BLD: 0.2 % (ref 0–8)
ERYTHROCYTE [DISTWIDTH] IN BLOOD BY AUTOMATED COUNT: 16.1 % (ref 11.5–14.5)
GIANT PLATELETS BLD QL SMEAR: PRESENT
HCT VFR BLD AUTO: 28.2 % (ref 37–48.5)
HGB BLD-MCNC: 9.1 G/DL (ref 12–16)
IMM GRANULOCYTES # BLD AUTO: 0.07 K/UL (ref 0–0.04)
IMM GRANULOCYTES NFR BLD AUTO: 1.5 % (ref 0–0.5)
LYMPHOCYTES # BLD AUTO: 1.1 K/UL (ref 1–4.8)
LYMPHOCYTES NFR BLD: 23.6 % (ref 18–48)
MCH RBC QN AUTO: 28.5 PG (ref 27–31)
MCHC RBC AUTO-ENTMCNC: 32.3 G/DL (ref 32–36)
MCV RBC AUTO: 88 FL (ref 82–98)
MONOCYTES # BLD AUTO: 1 K/UL (ref 0.3–1)
MONOCYTES NFR BLD: 21.8 % (ref 4–15)
NEUTROPHILS # BLD AUTO: 2.5 K/UL (ref 1.8–7.7)
NEUTROPHILS NFR BLD: 52.7 % (ref 38–73)
NRBC BLD-RTO: 0 /100 WBC
OVALOCYTES BLD QL SMEAR: ABNORMAL
PLATELET # BLD AUTO: 75 K/UL (ref 150–350)
PLATELET BLD QL SMEAR: ABNORMAL
PMV BLD AUTO: 12 FL (ref 9.2–12.9)
POIKILOCYTOSIS BLD QL SMEAR: SLIGHT
POLYCHROMASIA BLD QL SMEAR: ABNORMAL
RBC # BLD AUTO: 3.19 M/UL (ref 4–5.4)
TOXIC GRANULES BLD QL SMEAR: PRESENT
WBC # BLD AUTO: 4.67 K/UL (ref 3.9–12.7)

## 2019-09-06 PROCEDURE — 36415 COLL VENOUS BLD VENIPUNCTURE: CPT

## 2019-09-06 PROCEDURE — 99214 OFFICE O/P EST MOD 30 MIN: CPT | Mod: S$PBB,GC,, | Performed by: STUDENT IN AN ORGANIZED HEALTH CARE EDUCATION/TRAINING PROGRAM

## 2019-09-06 PROCEDURE — 99213 OFFICE O/P EST LOW 20 MIN: CPT | Mod: PBBFAC | Performed by: STUDENT IN AN ORGANIZED HEALTH CARE EDUCATION/TRAINING PROGRAM

## 2019-09-06 PROCEDURE — 99999 PR PBB SHADOW E&M-EST. PATIENT-LVL III: ICD-10-PCS | Mod: PBBFAC,GC,, | Performed by: STUDENT IN AN ORGANIZED HEALTH CARE EDUCATION/TRAINING PROGRAM

## 2019-09-06 PROCEDURE — 99214 PR OFFICE/OUTPT VISIT, EST, LEVL IV, 30-39 MIN: ICD-10-PCS | Mod: S$PBB,GC,, | Performed by: STUDENT IN AN ORGANIZED HEALTH CARE EDUCATION/TRAINING PROGRAM

## 2019-09-06 PROCEDURE — 85025 COMPLETE CBC W/AUTO DIFF WBC: CPT

## 2019-09-06 PROCEDURE — 99999 PR PBB SHADOW E&M-EST. PATIENT-LVL III: CPT | Mod: PBBFAC,GC,, | Performed by: STUDENT IN AN ORGANIZED HEALTH CARE EDUCATION/TRAINING PROGRAM

## 2019-09-06 NOTE — PROGRESS NOTES
PATIENT: Rosita Almodovar  MRN: [id number]  DATE: 9/6/2019      Diagnosis:   1. Antineoplastic chemotherapy induced anemia    2. Small cell lung cancer, left    3. 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  She was started on carboplatin, etoposide, and atezolizumab.  No focal neurologic deficits and thus WBRT was initially deferred.   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  See comment.  Comment:  The left lung biopsy shows a malignant proliferation of cells with increased nuclear cytoplasmic ratio, stippled chromatin pattern with small amount of cytoplasm. Some of cells show molding and crush artifact. The majority of the cells are relatively small in size, however there are a few areas with larger cells within increased amount of cytoplasm. The cells are arranged in sheets and nests with brisk mitotic activity and areas of necrosis. Immunohistochemical stains reveal the tumor cells to stain positively with cytokeratin AE1/AE3, synaptophysin, chromogranin and TTF-1. Immunohistochemical stain for p63 is negative within the tumor cells. Immunostain for cytokeratin 7 shows positive staining in the background lung but is negative within tumor cells. All stains have atisfactory positive negative controls. 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 Correlate clinically.        Subjective:    Interval History: Ms. Almodovar is here for extensive stage small cell lung cancer s/p 4 cycles of carboplatin etoposide and is here for follow up after recent ED evaluation for symptomatic anemia requiring 2 units of blood.    She says she was coughing and feeling cold in addition to her shortness of breath when she presented to the ED.  She was found to have a BP of 97/57 and a hemoglobin of 5 when she was there.  She was given IV fluids, zosyn, and 2 units of blood.  Platelet count was also noted to be low in the 30s but she did not report signs or symptoms or have physical evidence of active bleeding and platelets were not transfused.  She was also noted to be neutropenic without fevers, chills, or tachycardia.  CXR was done and did not show any new infiltrate concerning for pneumonia.  Since her discharge she doesn't feel as cold and also stopped coughing.  She was restarted on her home sotalol and was also instructed to take 3 days of prednisone presumably for a mild COPD exacerbation.    Her  accompanies her at this visit.     Past Medical History:   Past Medical History:   Diagnosis Date    Acquired hypothyroidism 5/8/2019    Brain tumor     COPD (chronic obstructive pulmonary disease) 5/8/2019    Gastric ulcer     Hilar mass     Kidney cysts     left    Pleural effusion        Past Surgical HIstory:   Past Surgical History:   Procedure Laterality Date    ANGIOGRAM, CORONARY, WITH LEFT HEART CATHETERIZATION      endoscope      TAYURLLDF-MSFV-X-CATH LEFT NECK Left 6/4/2019    Performed by Reggie Franklin Jr., MD at [facility] OR [address] [place] [address]    kidney cyst excision and drainage         Family History: No family history on file.    Social History:  reports that she quit smoking about 5 years ago. Her smoking use included  cigarettes. She has a 54.00 pack-year smoking history. She has never used smokeless tobacco. She reports that she does not drink alcohol or use drugs.    Allergies:  Review of patient's allergies indicates:   Allergen Reactions    Potassium Nausea Only       Medications:  Current Outpatient Medications   Medication Sig Dispense Refill    albuterol (VENTOLIN HFA) 90 mcg/actuation inhaler Ventolin HFA 90 mcg/actuation aerosol inhaler      budesonide-formoterol 160-4.5 mcg (SYMBICORT) 160-4.5 mcg/actuation HFAA Inhale 2 puffs into the lungs every 12 (twelve) hours. Controller 1 Inhaler 11    clonazePAM (KLONOPIN) 0.5 MG tablet TAKE ONE TABLET BY MOUTH THREE TIMES DAILY AS NEEDED FOR ANXIETY OR for panic attacks  3    cyproheptadine (PERIACTIN) 4 mg tablet Take 1 tablet (4 mg total) by mouth 4 (four) times daily. 90 tablet 2    DECARA 50,000 unit capsule Take 50,000 Units by mouth every 7 days.  3    levothyroxine (SYNTHROID) 50 MCG tablet Take 50 mcg by mouth every morning.  3    lidocaine-prilocaine (EMLA) cream Apply to port site 1 hour prior to chemotherapy and cover with saran wrap 30 g 3    lisinopril (PRINIVIL,ZESTRIL) 5 MG tablet Take 5 mg by mouth once daily.       ondansetron (ZOFRAN-ODT) 8 MG TbDL Dissolve 1 tablet (8 mg total) under tongue every 8 (eight) hours as needed (nausea). 30 tablet 3    pantoprazole (PROTONIX) 40 MG tablet TAKE ONE TABLET BY MOUTH EVERY DAY FOR stomach  3    pravastatin (PRAVACHOL) 20 MG tablet Take 20 mg by mouth once daily.       predniSONE (DELTASONE) 20 MG tablet TAKE ONE TABLET BY MOUTH EVERY DAY FOR 3 DAYS AND REPEAT FOR FLARE OF LUNGS AS DIRECTED  2    sotalol (BETAPACE) 80 MG tablet Take 0.5 tablets (40 mg total) by mouth once daily. 15 tablet 1    tiotropium bromide 2.5 mcg/actuation Mist Inhale 5 mcg into the lungs once daily. Controller 4 g 5    traMADol (ULTRAM) 50 mg tablet Take 50 mg by mouth every 12 (twelve) hours as needed.   3     No current  "facility-administered medications for this visit.      Facility-Administered Medications Ordered in Other Visits   Medication Dose Route Frequency Provider Last Rate Last Dose    alteplase injection 2 mg  2 mg Intra-Catheter PRN Stephan Atkinson MD   2 mg at 07/05/19 1408       Review of Systems   Constitutional: Positive for activity change, appetite change, fatigue and unexpected weight change. Negative for chills and fever.   HENT: Negative for nosebleeds.    Eyes: Negative for visual disturbance.   Respiratory: Positive for shortness of breath. Negative for cough.    Cardiovascular: Negative for chest pain and leg swelling.   Gastrointestinal: Positive for nausea. Negative for abdominal distention, abdominal pain, constipation and diarrhea.   Endocrine: Negative for cold intolerance and heat intolerance.   Genitourinary: Negative for dysuria.   Musculoskeletal: Positive for back pain (chronic unchanged).   Skin: Negative for color change and rash.   Neurological: Positive for weakness. Negative for dizziness, light-headedness and numbness.   Hematological: Negative for adenopathy.   Psychiatric/Behavioral: Negative for confusion.       ECOG Performance Status:  3  Objective:      Vitals:   Vitals:    09/06/19 0806   BP: (!) 141/81   BP Location: Left arm   Patient Position: Sitting   BP Method: Medium (Automatic)   Pulse: 109   Resp: 18   Temp: 98.2 °F (36.8 °C)   TempSrc: Oral   SpO2: 95%   Weight: 50.1 kg (110 lb 7.2 oz)   Height: 5' 2" (1.575 m)     BMI: Body mass index is 20.2 kg/m².   Wt Readings from Last 10 Encounters:   09/05/19 49.5 kg (109 lb 2 oz)   08/22/19 50.7 kg (111 lb 12.4 oz)   08/21/19 50.7 kg (111 lb 12.4 oz)   08/21/19 50.7 kg (111 lb 12.4 oz)   08/07/19 51.5 kg (113 lb 8.6 oz)   08/02/19 51.4 kg (113 lb 5.1 oz)   08/01/19 51.4 kg (113 lb 5.1 oz)   07/24/19 51.4 kg (113 lb 5.1 oz)   07/08/19 54.1 kg (119 lb 3.2 oz)   07/03/19 51.2 kg (112 lb 14 oz)         Physical Exam   Constitutional: She " appears well-developed.   HENT:   Head: Normocephalic.   Eyes: Pupils are equal, round, and reactive to light. EOM are normal. No scleral icterus.   Neck: Normal range of motion. No tracheal deviation present.   Cardiovascular: Normal rate, regular rhythm and normal heart sounds. Exam reveals no gallop and no friction rub.   No murmur heard.  Pulmonary/Chest: Effort normal. No respiratory distress. She has no wheezes. She has no rales.   Diminished breath sounds leftat base; unchanged from prior exam.   Abdominal: Soft. Bowel sounds are normal. She exhibits no distension and no mass. There is no tenderness. There is no guarding.   Musculoskeletal: Normal range of motion. She exhibits no edema.   Lymphadenopathy:     She has no cervical adenopathy.   Neurological: She is alert.   Skin: Skin is warm and dry.       Laboratory Data:     Recent Results (from the past 168 hour(s))   Complete Blood Count (CBC)    Collection Time: 09/04/19 11:47 AM   Result Value Ref Range    WBC 1.00 (LL) 3.90 - 12.70 K/uL    RBC 1.82 (L) 4.00 - 5.40 M/uL    Hemoglobin 5.3 (LL) 12.0 - 16.0 g/dL    Hematocrit 16.0 (LL) 37.0 - 48.5 %    Mean Corpuscular Volume 88 82 - 98 fL    Mean Corpuscular Hemoglobin 29.2 27.0 - 31.0 pg    Mean Corpuscular Hemoglobin Conc 33.2 32.0 - 36.0 g/dL    RDW 17.1 (H) 11.5 - 14.5 %    Platelets 31 (LL) 150 - 350 K/uL    MPV 9.0 (L) 9.2 - 12.9 fL    Gran% 5.0 (L) 38.0 - 73.0 %    Lymph% 62.0 (H) 18.0 - 48.0 %    Mono% 22.0 (H) 4.0 - 15.0 %    Eosinophil% 0.0 0.0 - 8.0 %    Basophil% 0.0 0.0 - 1.9 %    Bands 11.0 %    Toxic Granulation Present     Large/Giant Platelets Present     Differential Method Manual    Comprehensive Metabolic Panel (CMP)    Collection Time: 09/04/19 11:47 AM   Result Value Ref Range    Sodium 129 (L) 136 - 145 mmol/L    Potassium 3.9 3.5 - 5.1 mmol/L    Chloride 92 (L) 101 - 111 mmol/L    CO2 28 23 - 29 mmol/L    Glucose 110 74 - 118 mg/dL    BUN, Bld 16 8 - 23 mg/dL    Creatinine 1.0 0.5 -  1.4 mg/dL    Calcium 7.8 (L) 8.6 - 10.0 mg/dL    Total Protein 6.3 6.0 - 8.4 g/dL    Albumin 2.9 (L) 3.5 - 5.2 g/dL    Total Bilirubin 0.8 0.3 - 1.2 mg/dL    Alkaline Phosphatase 155 (H) 38 - 126 U/L    AST 13 (L) 15 - 41 U/L    ALT 7 (L) 14 - 54 U/L    Anion Gap 9 8 - 16 mmol/L    eGFR if African American >60.0 >60 mL/min/1.73 m^2    eGFR if non African American 54.5 (A) >60 mL/min/1.73 m^2   Brain natriuretic peptide    Collection Time: 09/04/19 11:47 AM   Result Value Ref Range     (H) 0 - 99 pg/mL   Troponin I    Collection Time: 09/04/19 11:47 AM   Result Value Ref Range    Troponin I 0.01 0.01 - 0.05 ng/mL   Type & Screen    Collection Time: 09/04/19 12:46 PM   Result Value Ref Range    ABO O     Rh Type POS     Indirect Idalmis GEL NEG    Prepare RBC 2 Units; anemia    Collection Time: 09/04/19 12:46 PM   Result Value Ref Range    UNIT NUMBER [id number]     Product Code N6885A90     DISPENSE STATUS TRANSFUSED     CODING SYSTEM [id number]     Unit Blood Type Code 5100     Unit Blood Type O POS     Unit Expiration [id number]     UNIT NUMBER [id number]     Product Code A8622T16     DISPENSE STATUS TRANSFUSED     CODING SYSTEM [id number]     Unit Blood Type Code 5100     Unit Blood Type O POS     Unit Expiration [id number]    Blood culture #1 **CANNOT BE ORDERED STAT**    Collection Time: 09/04/19  1:28 PM   Result Value Ref Range    Blood Culture, Routine No Growth to date     Blood Culture, Routine No Growth to date    Blood culture #2 **CANNOT BE ORDERED STAT**    Collection Time: 09/04/19  1:32 PM   Result Value Ref Range    Blood Culture, Routine No Growth to date     Blood Culture, Routine No Growth to date    CBC auto differential    Collection Time: 09/05/19  2:32 AM   Result Value Ref Range    WBC 1.50 (LL) 3.90 - 12.70 K/uL    RBC 2.79 (L) 4.00 - 5.40 M/uL    Hemoglobin 8.1 (L) 12.0 - 16.0 g/dL    Hematocrit 23.9 (L) 37.0 - 48.5 %    Mean Corpuscular Volume 86 82 - 98 fL    Mean Corpuscular  Hemoglobin 29.1 27.0 - 31.0 pg    Mean Corpuscular Hemoglobin Conc 33.9 32.0 - 36.0 g/dL    RDW 15.5 (H) 11.5 - 14.5 %    Platelets 38 (LL) 150 - 350 K/uL    MPV 8.7 (L) 9.2 - 12.9 fL    Gran # (ANC) 0.3 (L) 1.8 - 7.7 K/uL    Lymph # 0.7 (L) 1.0 - 4.8 K/uL    Mono # 0.5 0.3 - 1.0 K/uL    Eos # 0.0 0.0 - 0.5 K/uL    Baso # 0.00 0.00 - 0.20 K/uL    Gran% 21.3 (L) 38.0 - 73.0 %    Lymph% 45.6 18.0 - 48.0 %    Mono% 32.8 (H) 4.0 - 15.0 %    Eosinophil% 0.1 0.0 - 8.0 %    Basophil% 0.2 0.0 - 1.9 %    Platelet Estimate Decreased (A)     Aniso Slight     Poik Slight     Hypo Occasional     Large/Giant Platelets Present     Differential Method Automated    Comprehensive metabolic panel    Collection Time: 09/05/19  2:32 AM   Result Value Ref Range    Sodium 129 (L) 136 - 145 mmol/L    Potassium 3.4 (L) 3.5 - 5.1 mmol/L    Chloride 93 (L) 101 - 111 mmol/L    CO2 28 23 - 29 mmol/L    Glucose 94 74 - 118 mg/dL    BUN, Bld 14 8 - 23 mg/dL    Creatinine 1.0 0.5 - 1.4 mg/dL    Calcium 7.7 (L) 8.6 - 10.0 mg/dL    Total Protein 5.7 (L) 6.0 - 8.4 g/dL    Albumin 2.5 (L) 3.5 - 5.2 g/dL    Total Bilirubin 1.3 (H) 0.3 - 1.2 mg/dL    Alkaline Phosphatase 138 (H) 38 - 126 U/L    AST 11 (L) 15 - 41 U/L    ALT 6 (L) 14 - 54 U/L    Anion Gap 8 8 - 16 mmol/L    eGFR if African American >60.0 >60 mL/min/1.73 m^2    eGFR if non African American 54.5 (A) >60 mL/min/1.73 m^2   CBC auto differential    Collection Time: 09/06/19  7:46 AM   Result Value Ref Range    WBC 4.67 3.90 - 12.70 K/uL    RBC 3.19 (L) 4.00 - 5.40 M/uL    Hemoglobin 9.1 (L) 12.0 - 16.0 g/dL    Hematocrit 28.2 (L) 37.0 - 48.5 %    Mean Corpuscular Volume 88 82 - 98 fL    Mean Corpuscular Hemoglobin 28.5 27.0 - 31.0 pg    Mean Corpuscular Hemoglobin Conc 32.3 32.0 - 36.0 g/dL    RDW 16.1 (H) 11.5 - 14.5 %    Platelets 75 (L) 150 - 350 K/uL    MPV 12.0 9.2 - 12.9 fL    Immature Granulocytes 1.5 (H) 0.0 - 0.5 %    Gran # (ANC) 2.5 1.8 - 7.7 K/uL    Immature Grans (Abs) 0.07 (H)  0.00 - 0.04 K/uL    Lymph # 1.1 1.0 - 4.8 K/uL    Mono # 1.0 0.3 - 1.0 K/uL    Eos # 0.0 0.0 - 0.5 K/uL    Baso # 0.01 0.00 - 0.20 K/uL    nRBC 0 0 /100 WBC    Gran% 52.7 38.0 - 73.0 %    Lymph% 23.6 18.0 - 48.0 %    Mono% 21.8 (H) 4.0 - 15.0 %    Eosinophil% 0.2 0.0 - 8.0 %    Basophil% 0.2 0.0 - 1.9 %    Platelet Estimate Decreased (A)     Aniso Slight     Poik Slight     Poly Occasional     Ovalocytes Occasional     Toxic Granulation Present     Large/Giant Platelets Present     Differential Method Automated      Imagin19 xray  FINDINGS:  Two shift of the heart mediastinum to the left with left-sided pleuroparenchymal abnormality has slightly improved with some residual basal opacity seen in the retrocardiac area however likely lobar atelectasis.  Right lung is normally aerated.    The cardiac silhouette is normal in size. Port-A-Cath remains.    No acute bony change seen.      Impression       1. 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  Further evaluation with CT should be considered.               Assessment:       1. Antineoplastic chemotherapy induced anemia    2. Small cell lung cancer, left    3. Secondary malignant neoplasm of brain           Plan:         1. Chemotherapy related anemia - s/p 2 units at OSH and CBC today shows stable hemoglobin >7.  Leukopenia also resolved likely with some assistance from her prednisone.  Platelet count also improving.  With all counts improving, likely chemotherapy related.  Mild bilirubin 1.3 makes hemolytic anemia less likely especially with stable blood counts today.      2. 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  While treating small cell lung cancer is the priority, typical approach to purely large cell  neuroendocrine is similar to that of NSCLC.  Small cell lung cancer treatment overlaps with that of NSCLC with platinum and immunotherapy components.  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  Was started on carboplatin, etoposide and atezolizumab.  S/p 3 cycles, CT imaging 8/20/2019 showed interval improvement.  Has completed 4 cycles of carboplatin, etoposide, and atezolizumab.  --Imodium PRN for chemo induced diarrhea   -Continue with HH/PT at home  -s/p port placement 6/4/19  -Return to clinic 9/11/19 for maintenance atezolizumab  --May need additional fluids if still hyponatremic next week  -Will start WBRT with Dr. Quintana next week as well.  --Monitor TSH and HbA1c q4 cycles      Advance Care Planning I initiated the process of advance care planning at prior visit and explained the importance of this process to the patient.  Then the patient received detailed information about the importance of designating a Health Care Power of  (HCPOA). she was instructed to communicate with this person about their wishes for future healthcare, should she become sick and lose decision-making capacity. The patient has previously appointed a HCPOA. After our discussion, the patient has decided to complete a HCPOA and has appointed her significant other and NAME:Edenilson Almodovar   I spent a total time of 16 minutes discussing this issue with the patient.     No orders of the defined types were placed in this encounter.        Stephan Atkinson MD  Hematology Oncology Fellow PGY6  Pager [phone]    Distress Screening Results: Psychosocial Distress screening score of   noted and reviewed. No intervention indicated.           ATTENDING NOTE, ONCOLOGY CLINIC    As above.  Patient seen and examined, chart reviewed.  Appears comfortable, in NAD.  Lungs are clear to auscultation.  Abdomen is soft, nontender.  Labs reviewed. Hg is 9.1 gr/dl today. WBCs have increased to 4,600 /mm3, while her platelet count is 75K, up from 38K  yesterday..    PLAN  She will see us again with repeat labs in  5 days.  Her multiple questions were answered to her satisfaction.      Bjorn Rodriguez MD

## 2019-09-10 RX ORDER — SODIUM CHLORIDE 0.9 % (FLUSH) 0.9 %
10 SYRINGE (ML) INJECTION
Status: CANCELLED | OUTPATIENT
Start: 2019-09-11

## 2019-09-10 RX ORDER — HEPARIN 100 UNIT/ML
500 SYRINGE INTRAVENOUS
Status: CANCELLED | OUTPATIENT
Start: 2019-09-11

## 2019-09-10 RX ORDER — ONDANSETRON 2 MG/ML
8 INJECTION INTRAMUSCULAR; INTRAVENOUS
Status: CANCELLED | OUTPATIENT
Start: 2019-09-11

## 2019-09-11 ENCOUNTER — OFFICE VISIT (OUTPATIENT)
Dept: HEMATOLOGY/ONCOLOGY | Facility: CLINIC | Age: 79
End: 2019-09-11
Payer: MEDICARE

## 2019-09-11 ENCOUNTER — INFUSION (OUTPATIENT)
Dept: INFUSION THERAPY | Facility: HOSPITAL | Age: 79
End: 2019-09-11
Attending: INTERNAL MEDICINE
Payer: MEDICARE

## 2019-09-11 ENCOUNTER — TELEPHONE (OUTPATIENT)
Dept: RADIATION ONCOLOGY | Facility: CLINIC | Age: 79
End: 2019-09-11

## 2019-09-11 VITALS
RESPIRATION RATE: 16 BRPM | BODY MASS INDEX: 20.69 KG/M2 | WEIGHT: 109.56 LBS | HEIGHT: 61 IN | OXYGEN SATURATION: 94 % | TEMPERATURE: 98 F | SYSTOLIC BLOOD PRESSURE: 122 MMHG | DIASTOLIC BLOOD PRESSURE: 59 MMHG | HEART RATE: 87 BPM

## 2019-09-11 VITALS
SYSTOLIC BLOOD PRESSURE: 127 MMHG | HEIGHT: 61 IN | DIASTOLIC BLOOD PRESSURE: 71 MMHG | WEIGHT: 109.56 LBS | TEMPERATURE: 98 F | BODY MASS INDEX: 20.69 KG/M2 | HEART RATE: 83 BPM | RESPIRATION RATE: 18 BRPM

## 2019-09-11 DIAGNOSIS — C34.92 SMALL CELL LUNG CANCER, LEFT: Primary | ICD-10-CM

## 2019-09-11 DIAGNOSIS — E83.42 HYPOMAGNESEMIA: Primary | ICD-10-CM

## 2019-09-11 DIAGNOSIS — E86.0 DEHYDRATION: ICD-10-CM

## 2019-09-11 DIAGNOSIS — C34.92 SMALL CELL LUNG CANCER, LEFT: ICD-10-CM

## 2019-09-11 DIAGNOSIS — D64.81 ANTINEOPLASTIC CHEMOTHERAPY INDUCED ANEMIA: ICD-10-CM

## 2019-09-11 DIAGNOSIS — K59.00 CONSTIPATION, UNSPECIFIED CONSTIPATION TYPE: ICD-10-CM

## 2019-09-11 DIAGNOSIS — T45.1X5A ANTINEOPLASTIC CHEMOTHERAPY INDUCED ANEMIA: ICD-10-CM

## 2019-09-11 DIAGNOSIS — R63.0 ANOREXIA: ICD-10-CM

## 2019-09-11 PROCEDURE — 96361 HYDRATE IV INFUSION ADD-ON: CPT

## 2019-09-11 PROCEDURE — 99215 PR OFFICE/OUTPT VISIT, EST, LEVL V, 40-54 MIN: ICD-10-PCS | Mod: S$PBB,GC,, | Performed by: STUDENT IN AN ORGANIZED HEALTH CARE EDUCATION/TRAINING PROGRAM

## 2019-09-11 PROCEDURE — 99215 OFFICE O/P EST HI 40 MIN: CPT | Mod: S$PBB,GC,, | Performed by: STUDENT IN AN ORGANIZED HEALTH CARE EDUCATION/TRAINING PROGRAM

## 2019-09-11 PROCEDURE — 99214 OFFICE O/P EST MOD 30 MIN: CPT | Mod: PBBFAC | Performed by: STUDENT IN AN ORGANIZED HEALTH CARE EDUCATION/TRAINING PROGRAM

## 2019-09-11 PROCEDURE — 99999 PR PBB SHADOW E&M-EST. PATIENT-LVL IV: ICD-10-PCS | Mod: PBBFAC,GC,, | Performed by: STUDENT IN AN ORGANIZED HEALTH CARE EDUCATION/TRAINING PROGRAM

## 2019-09-11 PROCEDURE — 63600175 PHARM REV CODE 636 W HCPCS: Mod: JG | Performed by: STUDENT IN AN ORGANIZED HEALTH CARE EDUCATION/TRAINING PROGRAM

## 2019-09-11 PROCEDURE — 96413 CHEMO IV INFUSION 1 HR: CPT

## 2019-09-11 PROCEDURE — 99999 PR PBB SHADOW E&M-EST. PATIENT-LVL IV: CPT | Mod: PBBFAC,GC,, | Performed by: STUDENT IN AN ORGANIZED HEALTH CARE EDUCATION/TRAINING PROGRAM

## 2019-09-11 PROCEDURE — 96375 TX/PRO/DX INJ NEW DRUG ADDON: CPT

## 2019-09-11 RX ORDER — SODIUM CHLORIDE 0.9 % (FLUSH) 0.9 %
10 SYRINGE (ML) INJECTION
Status: DISCONTINUED | OUTPATIENT
Start: 2019-09-11 | End: 2019-09-11 | Stop reason: HOSPADM

## 2019-09-11 RX ORDER — AMOXICILLIN 250 MG
2 CAPSULE ORAL DAILY
Qty: 60 TABLET | Refills: 11 | Status: SHIPPED | OUTPATIENT
Start: 2019-09-11 | End: 2020-09-10

## 2019-09-11 RX ORDER — ONDANSETRON 2 MG/ML
8 INJECTION INTRAMUSCULAR; INTRAVENOUS
Status: COMPLETED | OUTPATIENT
Start: 2019-09-11 | End: 2019-09-11

## 2019-09-11 RX ORDER — HEPARIN 100 UNIT/ML
500 SYRINGE INTRAVENOUS
Status: DISCONTINUED | OUTPATIENT
Start: 2019-09-11 | End: 2019-09-11 | Stop reason: HOSPADM

## 2019-09-11 RX ADMIN — SODIUM CHLORIDE 500 ML: 0.9 INJECTION, SOLUTION INTRAVENOUS at 11:09

## 2019-09-11 RX ADMIN — HEPARIN SODIUM (PORCINE) LOCK FLUSH IV SOLN 100 UNIT/ML 500 UNITS: 100 SOLUTION at 12:09

## 2019-09-11 RX ADMIN — ONDANSETRON 8 MG: 2 INJECTION, SOLUTION INTRAMUSCULAR; INTRAVENOUS at 11:09

## 2019-09-11 RX ADMIN — ATEZOLIZUMAB 1200 MG: 1200 INJECTION, SOLUTION INTRAVENOUS at 11:09

## 2019-09-11 NOTE — Clinical Note
[facility]  2. Follow up with me 10/2/19 with cycle 6 atezolizumab after (she is not on chemo any more and does not need day 2 or day 3 infusion center appointments) thanks -e

## 2019-09-11 NOTE — TELEPHONE ENCOUNTER
Left message for pt to inform her that SIM and appointment with Dr. Quintana are made for 9/13 and to call if pt cannot make and needs to reschedule. ----- Message from Shimon Quintana MD sent at 9/11/2019 11:13 AM CDT -----  Regarding: RE: radiation plan  I think there was confusion about when her last cycle of chemotherapy was; we thought she was due for more this month.   Yes, I'll discuss potential risks/benefits of thoracic consolidation with her at follow-up.     Mary Kay, can you move up her appointment with me and CT Sim after to next week when I return from [place]? Or tomorrow if she can make it.    ----- Message -----  From: Stephan Atkinson MD  Sent: 9/11/2019  10:58 AM  To: Shimon Quintana MD  Subject: radiation plan                                   Hi Dr Mariano Copeland was wondering if there was a particular concern you had regarding Mrs Almodovar as we noticed her simulation was scheduled for October rather than today.    Dr. Copeland was also wondering if you would consider adding thoracic radiation as well.     Thanks.    Stephan Atkinson MD  Hematology Oncology Fellow PGY6  Pager [phone]

## 2019-09-11 NOTE — PLAN OF CARE
Problem: Adult Inpatient Plan of Care  Goal: Plan of Care Review  Outcome: Ongoing (interventions implemented as appropriate)  Patient tolerated Tecentriq and 500 NS bolus well today. NAD noted upon discharge. Port + blood return present, flushed, hep locked and deaccessed. AVS given. Reviewed upcoming appts. Discharged home, escorted in wheelchair by family.

## 2019-09-13 ENCOUNTER — OFFICE VISIT (OUTPATIENT)
Dept: RADIATION ONCOLOGY | Facility: CLINIC | Age: 79
End: 2019-09-13
Payer: MEDICARE

## 2019-09-13 VITALS
HEIGHT: 61 IN | SYSTOLIC BLOOD PRESSURE: 105 MMHG | OXYGEN SATURATION: 94 % | TEMPERATURE: 98 F | WEIGHT: 111.38 LBS | BODY MASS INDEX: 21.03 KG/M2 | RESPIRATION RATE: 16 BRPM | DIASTOLIC BLOOD PRESSURE: 51 MMHG | HEART RATE: 78 BPM

## 2019-09-13 DIAGNOSIS — C79.31 SECONDARY MALIGNANT NEOPLASM OF BRAIN: ICD-10-CM

## 2019-09-13 DIAGNOSIS — F41.9 ANXIETY: Primary | Chronic | ICD-10-CM

## 2019-09-13 PROCEDURE — 99213 PR OFFICE/OUTPT VISIT, EST, LEVL III, 20-29 MIN: ICD-10-PCS | Mod: S$PBB,,, | Performed by: RADIOLOGY

## 2019-09-13 PROCEDURE — 99999 PR PBB SHADOW E&M-EST. PATIENT-LVL IV: ICD-10-PCS | Mod: PBBFAC,,, | Performed by: RADIOLOGY

## 2019-09-13 PROCEDURE — 99999 PR PBB SHADOW E&M-EST. PATIENT-LVL IV: CPT | Mod: PBBFAC,,, | Performed by: RADIOLOGY

## 2019-09-13 PROCEDURE — 99214 OFFICE O/P EST MOD 30 MIN: CPT | Mod: PBBFAC | Performed by: RADIOLOGY

## 2019-09-13 PROCEDURE — 99213 OFFICE O/P EST LOW 20 MIN: CPT | Mod: S$PBB,,, | Performed by: RADIOLOGY

## 2019-09-13 RX ORDER — LORAZEPAM 1 MG/1
1 TABLET ORAL
Qty: 15 TABLET | Refills: 0 | Status: SHIPPED | OUTPATIENT
Start: 2019-09-13 | End: 2019-10-13

## 2019-09-16 ENCOUNTER — TELEPHONE (OUTPATIENT)
Dept: PULMONOLOGY | Facility: CLINIC | Age: 79
End: 2019-09-16

## 2019-09-16 ENCOUNTER — TELEPHONE (OUTPATIENT)
Dept: RADIATION ONCOLOGY | Facility: CLINIC | Age: 79
End: 2019-09-16

## 2019-09-16 NOTE — TELEPHONE ENCOUNTER
appt scheduled with MD  ----- Message from Amador Plummer sent at 9/16/2019  2:14 PM CDT -----  Contact: Patient  Type: Reschedule Request    Patient would like to reschedule an appointment.    Who Called: Patient  Original Appointment Scheduled: 9/17  Preference for Appointment Day/Time: 9/24  Best Call Back Number: [phone]  Additional Information: Patient has recently been to the ER and would like to reschedule. Please call to advise. Thanks!

## 2019-09-16 NOTE — TELEPHONE ENCOUNTER
Called pt back to check on her after fall. Saw in chart that CT was done. Left message for pt stating that I would call her on Thursday in the morning before her appointment in the afternoon to see how she is doing. Informed pt she should go to the ER if things change. ----- Message from Priti Martin sent at 9/16/2019  2:10 PM CDT -----  Pt fell and has a large knot on the back of her head.  She wants to know if she should still come in for her Sim on Thursday or wait a week. Please call at [phone].

## 2019-09-19 ENCOUNTER — TELEPHONE (OUTPATIENT)
Dept: RADIATION ONCOLOGY | Facility: CLINIC | Age: 79
End: 2019-09-19

## 2019-09-19 NOTE — TELEPHONE ENCOUNTER
Spoke with pt to check on her after her fall. Pt stated she was black and blue to her head and neck and wanted to postpone her SIM to later next week. Informed pt that new appointment slip was mailed to her and appointment rescheduled per her request.

## 2019-09-23 NOTE — PROGRESS NOTES
09/13/2019    Radiation Oncology Follow-Up Visit    Prior Radiation History: None    Assessment   This is a 79 y.o. 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 She had limited intracranial metastatic disease on last MRI Brain 5/9/19. She has now completed 4 cycles of Carbo/Etop/Atezo and is on maintenance immunotherapy. She is referred for consideration of radiation due to concern that intracranial disease.    I discussed the role of WBRT in management of brain metastases from SCLC. I recommend treating to 30 Gy in 10 fractions. Potential short- and long-term side effects of whole brain RT were reviewed. At the end of our discussion, she was in agreement with the proposed plan.     Regarding thoracic consolidation RT, I do not recommend it given her poor performance status. The randomized trial did not find a survival benefit at 1 year (the primary endpoint) though for patients who lived beyond 1 year there was a survival benefit by 2 years. Immunotherapy was also not standard of care at that time, so it remains to be seen whether RT provides a benefit in patients who receive maintenance IO. I believe the risks to her outweigh the limited potential benefit.        Plan   1) Schedule CT Simulation for WBRT planning.        Chief Complaint   Patient presents with    Cerebral mass     follow up to sign consent       HPI: Mrs. Almodovar returns today accompanied by her . She has completed her planned chemotherapy and is now starting maintenance immunotherapy. She denies HA, focal numbness or weakness, N/V, speech difficulty, or seizures. She is in a wheelchair and has generalized weakness and fatigue.       Past Medical History:   Diagnosis Date    Acquired hypothyroidism 5/8/2019    Brain tumor     COPD (chronic obstructive pulmonary disease) 5/8/2019    Gastric ulcer     Hilar mass     Kidney cysts     left    Pleural effusion        Past  Surgical History:   Procedure Laterality Date    ANGIOGRAM, CORONARY, WITH LEFT HEART CATHETERIZATION      endoscope      VFLRVUMQY-[id number]-K-CATH LEFT NECK Left 2019    [facility]    kidney cyst excision and drainage         Social History     Tobacco Use    Smoking status: Former Smoker     Packs/day: 1.00     Years: 54.00     Pack years: 54.00     Types: Cigarettes     Last attempt to quit: 2014     Years since quittin.7    Smokeless tobacco: Never Used   Substance Use Topics    Alcohol use: No     Frequency: Never    Drug use: No       Cancer-related family history is not on file.    Current Outpatient Medications on File Prior to Visit   Medication Sig Dispense Refill    albuterol (VENTOLIN HFA) 90 mcg/actuation inhaler Ventolin HFA 90 mcg/actuation aerosol inhaler      budesonide-formoterol 160-4.5 mcg (SYMBICORT) 160-4.5 mcg/actuation HFAA Inhale 2 puffs into the lungs every 12 (twelve) hours. Controller 1 Inhaler 11    clonazePAM (KLONOPIN) 0.5 MG tablet TAKE ONE TABLET BY MOUTH THREE TIMES DAILY AS NEEDED FOR ANXIETY OR for panic attacks  3    cyproheptadine (PERIACTIN) 4 mg tablet Take 1 tablet (4 mg total) by mouth 4 (four) times daily. 90 tablet 2    DECARA 50,000 unit capsule Take 50,000 Units by mouth every 7 days.  3    levothyroxine (SYNTHROID) 50 MCG tablet Take 50 mcg by mouth every morning.  3    lidocaine-prilocaine (EMLA) cream Apply to port site 1 hour prior to chemotherapy and cover with saran wrap 30 g 3    lisinopril (PRINIVIL,ZESTRIL) 5 MG tablet Take 5 mg by mouth once daily.       ondansetron (ZOFRAN-ODT) 8 MG TbDL Dissolve 1 tablet (8 mg total) under tongue every 8 (eight) hours as needed (nausea). 30 tablet 3    pantoprazole (PROTONIX) 40 MG tablet TAKE ONE TABLET BY MOUTH EVERY DAY FOR stomach  3    pravastatin (PRAVACHOL) 20 MG tablet Take 20 mg by mouth once daily.       senna-docusate 8.6-50 mg (PERICOLACE)  "8.6-50 mg per tablet Take 2 tablets by mouth once daily. 60 tablet 11    sotalol (BETAPACE) 80 MG tablet Take 0.5 tablets (40 mg total) by mouth once daily. 15 tablet 1    tiotropium bromide 2.5 mcg/actuation Mist Inhale 5 mcg into the lungs once daily. Controller 4 g 5    traMADol (ULTRAM) 50 mg tablet Take 50 mg by mouth every 12 (twelve) hours as needed.   3     Current Facility-Administered Medications on File Prior to Visit   Medication Dose Route Frequency Provider Last Rate Last Dose    alteplase injection 2 mg  2 mg Intra-Catheter PRN Stephan Atkinson MD   2 mg at 07/05/19 1408       Review of patient's allergies indicates:   Allergen Reactions    Potassium Nausea Only       Review of Systems   Constitutional: Positive for malaise/fatigue and weight loss. Negative for fever.   HENT: Positive for hearing loss and tinnitus. Negative for ear pain and sore throat.    Eyes: Negative for blurred vision and double vision.   Respiratory: Positive for cough, shortness of breath and wheezing. Negative for hemoptysis.    Cardiovascular: Negative for chest pain and leg swelling.   Gastrointestinal: Positive for diarrhea. Negative for abdominal pain, constipation, heartburn and nausea.   Genitourinary: Negative for dysuria and hematuria.   Musculoskeletal: Positive for back pain. Negative for falls and joint pain.   Neurological: Positive for tingling. Negative for speech change, focal weakness, seizures and headaches.   Psychiatric/Behavioral: Negative for depression. The patient is nervous/anxious.         Vital Signs: BP (!) 105/51 (BP Location: Right arm, Patient Position: Sitting)   Pulse 78   Temp 98.1 °F (36.7 °C) (Oral)   Resp 16   Ht 5' 1" (1.549 m)   Wt 50.5 kg (111 lb 6.4 oz)   SpO2 (!) 94%   BMI 21.05 kg/m²     ECOG Performance Status: 3 - Confined to bed or chair 50% of waking hours    Physical Exam   Constitutional: She is oriented to person, place, and time and well-developed, well-nourished, and in " no distress.   Frail appearing, in wheelchair   HENT:   Head: Normocephalic and atraumatic.   Mouth/Throat: Oropharynx is clear and moist.   Eyes: EOM are normal. No scleral icterus.   Neck: Normal range of motion. Neck supple.   Pulmonary/Chest: No accessory muscle usage. No respiratory distress.   Abdominal: Soft. Normal appearance. She exhibits no distension.   Musculoskeletal: Normal range of motion. She exhibits no edema.   Lymphadenopathy:     She has no cervical adenopathy.        Right: No supraclavicular adenopathy present.        Left: No supraclavicular adenopathy present.   Neurological: She is alert and oriented to person, place, and time. No cranial nerve deficit. Gait normal.   Skin: Skin is warm and dry.   Psychiatric: Mood, affect and judgment normal.   Vitals reviewed.       Labs:    Imaging: I have personally reviewed the patient's available images and reports and summarized pertinent findings above in HPI.     Pathology: N/A

## 2019-09-24 ENCOUNTER — TELEPHONE (OUTPATIENT)
Dept: PULMONOLOGY | Facility: CLINIC | Age: 79
End: 2019-09-24

## 2019-09-24 NOTE — TELEPHONE ENCOUNTER
Gave patient an appt for 10/8/2019 a t10:40 am. I called [facility] to inform them that the patient cancelled her appt and rescheduled for 10/8/2019 the CMN and office notes will be sent then.      ----- Message from Karen Martin sent at 9/24/2019 10:13 AM CDT -----  Contact: [phone]  Patient is requesting a call back from the nurse stated she unable to keep her appt time she prefer to come in early on 9/25/19, that time is not available at call time.    Please call the patient upon request at phone number [phone].

## 2019-09-26 ENCOUNTER — HOSPITAL ENCOUNTER (OUTPATIENT)
Dept: RADIATION THERAPY | Facility: HOSPITAL | Age: 79
Discharge: HOME | End: 2019-09-26
Attending: RADIOLOGY
Payer: MEDICARE

## 2019-09-26 PROCEDURE — 77334 RADIATION TREATMENT AID(S): CPT | Mod: TC | Performed by: RADIOLOGY

## 2019-09-26 PROCEDURE — 77263 PR  RADIATION THERAPY PLAN COMPLEX: ICD-10-PCS | Mod: ,,, | Performed by: RADIOLOGY

## 2019-09-26 PROCEDURE — 77290 THER RAD SIMULAJ FIELD CPLX: CPT | Mod: 26,,, | Performed by: RADIOLOGY

## 2019-09-26 PROCEDURE — 77334 PR  RADN TREATMENT AID(S) COMPLX: ICD-10-PCS | Mod: 26,,, | Performed by: RADIOLOGY

## 2019-09-26 PROCEDURE — 77290 THER RAD SIMULAJ FIELD CPLX: CPT | Mod: TC | Performed by: RADIOLOGY

## 2019-09-26 PROCEDURE — 77290 PR  SET RADN THERAPY FIELD COMPLEX: ICD-10-PCS | Mod: 26,,, | Performed by: RADIOLOGY

## 2019-09-26 PROCEDURE — 77263 THER RADIOLOGY TX PLNG CPLX: CPT | Mod: ,,, | Performed by: RADIOLOGY

## 2019-09-26 PROCEDURE — 77334 RADIATION TREATMENT AID(S): CPT | Mod: 26,,, | Performed by: RADIOLOGY

## 2019-09-26 PROCEDURE — 77014 HC CT GUIDANCE RADIATION THERAPY FLDS PLACEMENT: CPT | Mod: TC | Performed by: RADIOLOGY

## 2019-09-27 PROCEDURE — 77295 3-D RADIOTHERAPY PLAN: CPT | Mod: 26,,, | Performed by: RADIOLOGY

## 2019-09-27 PROCEDURE — 77295 PR 3D RADIOTHERAPY PLAN: ICD-10-PCS | Mod: 26,,, | Performed by: RADIOLOGY

## 2019-09-27 PROCEDURE — 77300 RADIATION THERAPY DOSE PLAN: CPT | Mod: TC | Performed by: RADIOLOGY

## 2019-09-27 PROCEDURE — 77300 PR RADIATION THERAPY,DOSIMETRY PLAN: ICD-10-PCS | Mod: 26,,, | Performed by: RADIOLOGY

## 2019-09-27 PROCEDURE — 77334 RADIATION TREATMENT AID(S): CPT | Mod: TC | Performed by: RADIOLOGY

## 2019-09-27 PROCEDURE — 77295 3-D RADIOTHERAPY PLAN: CPT | Mod: TC | Performed by: RADIOLOGY

## 2019-09-27 PROCEDURE — 77300 RADIATION THERAPY DOSE PLAN: CPT | Mod: 26,,, | Performed by: RADIOLOGY

## 2019-09-27 PROCEDURE — 77334 PR  RADN TREATMENT AID(S) COMPLX: ICD-10-PCS | Mod: 26,,, | Performed by: RADIOLOGY

## 2019-09-27 PROCEDURE — 77334 RADIATION TREATMENT AID(S): CPT | Mod: 26,,, | Performed by: RADIOLOGY

## 2019-10-01 ENCOUNTER — HOSPITAL ENCOUNTER (OUTPATIENT)
Dept: RADIATION THERAPY | Facility: HOSPITAL | Age: 79
Discharge: HOME | End: 2019-10-01
Attending: RADIOLOGY
Payer: MEDICARE

## 2019-10-01 PROCEDURE — 77417 THER RADIOLOGY PORT IMAGE(S): CPT | Performed by: RADIOLOGY

## 2019-10-01 PROCEDURE — 77412 RADIATION TX DELIVERY LVL 3: CPT | Performed by: RADIOLOGY

## 2019-10-02 ENCOUNTER — TELEPHONE (OUTPATIENT)
Dept: HEMATOLOGY/ONCOLOGY | Facility: CLINIC | Age: 79
End: 2019-10-02

## 2019-10-02 ENCOUNTER — OFFICE VISIT (OUTPATIENT)
Dept: HEMATOLOGY/ONCOLOGY | Facility: CLINIC | Age: 79
End: 2019-10-02
Payer: MEDICARE

## 2019-10-02 ENCOUNTER — DOCUMENTATION ONLY (OUTPATIENT)
Dept: HEMATOLOGY/ONCOLOGY | Facility: CLINIC | Age: 79
End: 2019-10-02

## 2019-10-02 ENCOUNTER — INFUSION (OUTPATIENT)
Dept: INFUSION THERAPY | Facility: HOSPITAL | Age: 79
End: 2019-10-02
Attending: INTERNAL MEDICINE
Payer: MEDICARE

## 2019-10-02 VITALS
RESPIRATION RATE: 20 BRPM | TEMPERATURE: 99 F | SYSTOLIC BLOOD PRESSURE: 131 MMHG | HEART RATE: 93 BPM | DIASTOLIC BLOOD PRESSURE: 73 MMHG

## 2019-10-02 VITALS
RESPIRATION RATE: 18 BRPM | HEART RATE: 91 BPM | DIASTOLIC BLOOD PRESSURE: 63 MMHG | OXYGEN SATURATION: 99 % | BODY MASS INDEX: 20.08 KG/M2 | SYSTOLIC BLOOD PRESSURE: 125 MMHG | WEIGHT: 106.25 LBS

## 2019-10-02 DIAGNOSIS — E09.9 DRUG OR CHEMICAL INDUCED DIABETES MELLITUS WITHOUT COMPLICATIONS: ICD-10-CM
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
## 2019-10-02 DIAGNOSIS — G93.89 CEREBELLAR MASS: ICD-10-CM

## 2019-10-02 DIAGNOSIS — C34.92 SMALL CELL LUNG CANCER, LEFT: Primary | ICD-10-CM

## 2019-10-02 DIAGNOSIS — E06.4 DRUG-INDUCED THYROIDITIS: ICD-10-CM

## 2019-10-02 DIAGNOSIS — R27.0 ATAXIA: ICD-10-CM
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
## 2019-10-02 DIAGNOSIS — C79.31 SECONDARY MALIGNANT NEOPLASM OF BRAIN: ICD-10-CM

## 2019-10-02 DIAGNOSIS — C34.90 SMALL CELL LUNG CANCER: ICD-10-CM

## 2019-10-02 PROCEDURE — 63600175 PHARM REV CODE 636 W HCPCS: Performed by: INTERNAL MEDICINE

## 2019-10-02 PROCEDURE — 77412 RADIATION TX DELIVERY LVL 3: CPT | Performed by: RADIOLOGY

## 2019-10-02 PROCEDURE — 99214 PR OFFICE/OUTPT VISIT, EST, LEVL IV, 30-39 MIN: ICD-10-PCS | Mod: S$PBB,GC,, | Performed by: STUDENT IN AN ORGANIZED HEALTH CARE EDUCATION/TRAINING PROGRAM

## 2019-10-02 PROCEDURE — 99214 OFFICE O/P EST MOD 30 MIN: CPT | Mod: S$PBB,GC,, | Performed by: STUDENT IN AN ORGANIZED HEALTH CARE EDUCATION/TRAINING PROGRAM

## 2019-10-02 PROCEDURE — 96375 TX/PRO/DX INJ NEW DRUG ADDON: CPT

## 2019-10-02 PROCEDURE — 99214 OFFICE O/P EST MOD 30 MIN: CPT | Mod: PBBFAC | Performed by: STUDENT IN AN ORGANIZED HEALTH CARE EDUCATION/TRAINING PROGRAM

## 2019-10-02 PROCEDURE — 96413 CHEMO IV INFUSION 1 HR: CPT

## 2019-10-02 PROCEDURE — 99999 PR PBB SHADOW E&M-EST. PATIENT-LVL IV: CPT | Mod: PBBFAC,GC,, | Performed by: STUDENT IN AN ORGANIZED HEALTH CARE EDUCATION/TRAINING PROGRAM

## 2019-10-02 PROCEDURE — 99999 PR PBB SHADOW E&M-EST. PATIENT-LVL IV: ICD-10-PCS | Mod: PBBFAC,GC,, | Performed by: STUDENT IN AN ORGANIZED HEALTH CARE EDUCATION/TRAINING PROGRAM

## 2019-10-02 PROCEDURE — 96361 HYDRATE IV INFUSION ADD-ON: CPT

## 2019-10-02 RX ORDER — HEPARIN 100 UNIT/ML
500 SYRINGE INTRAVENOUS
Status: DISCONTINUED | OUTPATIENT
Start: 2019-10-02 | End: 2019-10-02 | Stop reason: HOSPADM

## 2019-10-02 RX ORDER — SODIUM CHLORIDE 0.9 % (FLUSH) 0.9 %
10 SYRINGE (ML) INJECTION
Status: CANCELLED | OUTPATIENT
Start: 2019-10-02

## 2019-10-02 RX ORDER — HEPARIN 100 UNIT/ML
500 SYRINGE INTRAVENOUS
Status: CANCELLED | OUTPATIENT
Start: 2019-10-02

## 2019-10-02 RX ORDER — SODIUM CHLORIDE 0.9 % (FLUSH) 0.9 %
10 SYRINGE (ML) INJECTION
Status: DISCONTINUED | OUTPATIENT
Start: 2019-10-02 | End: 2019-10-02 | Stop reason: HOSPADM

## 2019-10-02 RX ORDER — ONDANSETRON 2 MG/ML
8 INJECTION INTRAMUSCULAR; INTRAVENOUS
Status: CANCELLED | OUTPATIENT
Start: 2019-10-02

## 2019-10-02 RX ORDER — ONDANSETRON 2 MG/ML
8 INJECTION INTRAMUSCULAR; INTRAVENOUS
Status: COMPLETED | OUTPATIENT
Start: 2019-10-02 | End: 2019-10-02

## 2019-10-02 RX ADMIN — SODIUM CHLORIDE 500 ML: 0.9 INJECTION, SOLUTION INTRAVENOUS at 09:10

## 2019-10-02 RX ADMIN — ATEZOLIZUMAB 1200 MG: 1200 INJECTION, SOLUTION INTRAVENOUS at 09:10

## 2019-10-02 RX ADMIN — ONDANSETRON 8 MG: 2 INJECTION, SOLUTION INTRAMUSCULAR; INTRAVENOUS at 09:10

## 2019-10-02 RX ADMIN — HEPARIN SODIUM (PORCINE) LOCK FLUSH IV SOLN 100 UNIT/ML 500 UNITS: 100 SOLUTION at 10:10

## 2019-10-02 NOTE — PLAN OF CARE
Pt tolerated Tecentriq with no complications. VSS. Pt instructed to call MD with any problems. NAD. Pt discharged home via wheelchair.

## 2019-10-02 NOTE — PROGRESS NOTES
This Oncology Social Worker is filling in today for patient's primary Oncology Social Worker [facility] who is off. Received consult from Dr. Bolivar re: arranging home health services for patient. Patient not current with [facility] (per my conversation with liabethany Sibley at ext. [phone]) or with [facility] (spoke with staff at [phone]) - see Mary Kay's note on 6/7. Met with patient and her  in the [facility] this morning to discuss. Patient is in agreement with having home health nursing, PT, and OT services at this time, and asked to use [facility]. She is not currently receiving home health services; she said it was too much to have their visits along with her other appointments when it was last ordered for her in June so she never started services. Home health visits will need to be coordinated in the afternoons as she is getting radiation treatments in the mornings (9:45 Mon - Fri appt. Times) at [facility] currently. Spoke with [facility] javier Sibley re: referral information. Agency staff received the orders via Epic and have scheduled start of care through their [place] agency office beginning on Fri 10/4. No other needs indicated at this time.

## 2019-10-02 NOTE — Clinical Note
1. Follow up with me 10/23/19 cmp, cbc, tsh, free t4, hba1c then chemo chair atezolizumab only cycle 7 after appointment.  Thanks -e

## 2019-10-02 NOTE — TELEPHONE ENCOUNTER
Message fwd to MD.       ----- Message from Luis Wright sent at 10/2/2019  3:12 PM CDT -----  Good Afternoon,     This pt has OP PT orders. Pt states she is supposed to have [facility]. If applicable, can you resubmit order to HH?    Thanks!

## 2019-10-03 PROCEDURE — 77412 RADIATION TX DELIVERY LVL 3: CPT | Performed by: RADIOLOGY

## 2019-10-04 ENCOUNTER — DOCUMENTATION ONLY (OUTPATIENT)
Dept: RADIATION ONCOLOGY | Facility: CLINIC | Age: 79
End: 2019-10-04

## 2019-10-04 PROCEDURE — 77412 RADIATION TX DELIVERY LVL 3: CPT | Performed by: RADIOLOGY

## 2019-10-04 NOTE — PLAN OF CARE
Day 4 of outpatient XRT to the brain. Reports head still hurts from fall 2 weeks ago. Reports fatigue and sleeping during day and not at night. Pt states she is dizzy at times but is drinking more Boost daily (2 cans). Pt in wheelchair accompanied by .

## 2019-10-07 PROCEDURE — G0180 PR HOME HEALTH MD CERTIFICATION: ICD-10-PCS | Mod: ,,, | Performed by: INTERNAL MEDICINE

## 2019-10-07 PROCEDURE — G0180 MD CERTIFICATION HHA PATIENT: HCPCS | Mod: ,,, | Performed by: INTERNAL MEDICINE

## 2019-10-08 PROCEDURE — 77336 RADIATION PHYSICS CONSULT: CPT | Performed by: RADIOLOGY

## 2019-10-08 PROCEDURE — 77412 RADIATION TX DELIVERY LVL 3: CPT | Performed by: RADIOLOGY

## 2019-10-10 PROCEDURE — 77412 RADIATION TX DELIVERY LVL 3: CPT | Performed by: RADIOLOGY

## 2019-10-10 PROCEDURE — 77417 THER RADIOLOGY PORT IMAGE(S): CPT | Performed by: RADIOLOGY

## 2019-10-11 ENCOUNTER — DOCUMENTATION ONLY (OUTPATIENT)
Dept: RADIATION ONCOLOGY | Facility: CLINIC | Age: 79
End: 2019-10-11

## 2019-10-11 ENCOUNTER — TELEPHONE (OUTPATIENT)
Dept: HEMATOLOGY/ONCOLOGY | Facility: CLINIC | Age: 79
End: 2019-10-11

## 2019-10-11 PROCEDURE — 77412 RADIATION TX DELIVERY LVL 3: CPT | Performed by: RADIOLOGY

## 2019-10-11 NOTE — PLAN OF CARE
Day 7 of outpatient XRT to the whole brain. Pt is weak and unsteady on feet when weighing. Pt in wheelchair with  at side. Home PT today. Follow up in 3 months with MRI.

## 2019-10-11 NOTE — TELEPHONE ENCOUNTER
----- Message from Kylee Angelo sent at 10/11/2019  1:35 PM CDT -----  Contact: home health nurse  She has elevated pulse of 132 all other signs in limit    Please call amalia (home healthcare nurse) at [phone]

## 2019-10-14 ENCOUNTER — TELEPHONE (OUTPATIENT)
Dept: PRIMARY CARE CLINIC | Facility: CLINIC | Age: 79
End: 2019-10-14

## 2019-10-14 PROCEDURE — G6002 STEREOSCOPIC X-RAY GUIDANCE: HCPCS | Mod: 26,,, | Performed by: RADIOLOGY

## 2019-10-14 PROCEDURE — 77387 GUIDANCE FOR RADJ TX DLVR: CPT | Mod: TC | Performed by: RADIOLOGY

## 2019-10-14 PROCEDURE — G6002 PR STEREOSCOPIC XRAY GUIDE FOR RADIATION TX DELIV: ICD-10-PCS | Mod: 26,,, | Performed by: RADIOLOGY

## 2019-10-14 PROCEDURE — 77412 RADIATION TX DELIVERY LVL 3: CPT | Performed by: RADIOLOGY

## 2019-10-14 NOTE — TELEPHONE ENCOUNTER
----- Message from Toña Miller sent at 10/14/2019  3:22 PM CDT -----  Contact: Autumn with [facility] phone [phone]  Autumn with [facility] phone [phone], Blood pressure is really low. Please call nurse to discuss. Thanks.

## 2019-10-14 NOTE — TELEPHONE ENCOUNTER
----- Message from Wade Azul sent at 10/14/2019  3:31 PM CDT -----  Contact: Giselle with [facility]   Giselle with [facility] called in regards to getting an order for hospital bed for patient she had a fall over the weekend with out any help getting out of bed please fax order directly to [facility] @ [phone]

## 2019-10-15 ENCOUNTER — EXTERNAL HOME HEALTH (OUTPATIENT)
Dept: HOME HEALTH SERVICES | Facility: HOSPITAL | Age: 79
End: 2019-10-15
Payer: MEDICARE

## 2019-10-15 PROCEDURE — 77412 RADIATION TX DELIVERY LVL 3: CPT | Performed by: RADIOLOGY

## 2019-10-16 ENCOUNTER — DOCUMENTATION ONLY (OUTPATIENT)
Dept: RADIATION ONCOLOGY | Facility: CLINIC | Age: 79
End: 2019-10-16

## 2019-10-16 PROCEDURE — 77336 RADIATION PHYSICS CONSULT: CPT | Performed by: RADIOLOGY

## 2019-10-16 PROCEDURE — 77412 RADIATION TX DELIVERY LVL 3: CPT | Performed by: RADIOLOGY

## 2019-10-17 ENCOUNTER — OFFICE VISIT (OUTPATIENT)
Dept: PULMONOLOGY | Facility: CLINIC | Age: 79
End: 2019-10-17
Payer: MEDICARE

## 2019-10-17 VITALS
DIASTOLIC BLOOD PRESSURE: 70 MMHG | WEIGHT: 101.88 LBS | SYSTOLIC BLOOD PRESSURE: 113 MMHG | HEIGHT: 61 IN | BODY MASS INDEX: 19.23 KG/M2 | OXYGEN SATURATION: 99 % | HEART RATE: 121 BPM

## 2019-10-17 DIAGNOSIS — J44.9 CHRONIC OBSTRUCTIVE PULMONARY DISEASE, UNSPECIFIED COPD TYPE: Primary | Chronic | ICD-10-CM

## 2019-10-17 DIAGNOSIS — R91.8 ABNORMAL CT SCAN, LUNG: ICD-10-CM

## 2019-10-17 PROCEDURE — 99213 OFFICE O/P EST LOW 20 MIN: CPT | Mod: S$PBB,,, | Performed by: INTERNAL MEDICINE

## 2019-10-17 PROCEDURE — 99214 OFFICE O/P EST MOD 30 MIN: CPT | Mod: PBBFAC,PO | Performed by: INTERNAL MEDICINE

## 2019-10-17 PROCEDURE — 99999 PR PBB SHADOW E&M-EST. PATIENT-LVL IV: ICD-10-PCS | Mod: PBBFAC,,, | Performed by: INTERNAL MEDICINE

## 2019-10-17 PROCEDURE — 99999 PR PBB SHADOW E&M-EST. PATIENT-LVL IV: CPT | Mod: PBBFAC,,, | Performed by: INTERNAL MEDICINE

## 2019-10-17 PROCEDURE — 99213 PR OFFICE/OUTPT VISIT, EST, LEVL III, 20-29 MIN: ICD-10-PCS | Mod: S$PBB,,, | Performed by: INTERNAL MEDICINE

## 2019-10-17 RX ORDER — PREDNISONE 20 MG/1
TABLET ORAL
Qty: 15 TABLET | Refills: 0 | Status: ON HOLD | OUTPATIENT
Start: 2019-10-17 | End: 2019-11-04 | Stop reason: HOSPADM

## 2019-10-17 RX ORDER — MIDODRINE HYDROCHLORIDE 5 MG/1
TABLET ORAL
Status: ON HOLD | COMMUNITY
Start: 2019-10-15 | End: 2019-11-04 | Stop reason: HOSPADM

## 2019-10-17 RX ORDER — ATENOLOL 50 MG/1
TABLET ORAL
Status: ON HOLD | COMMUNITY
End: 2019-11-04 | Stop reason: HOSPADM

## 2019-10-17 NOTE — PROGRESS NOTES
"10/17/2019    Rosita Almodovar  Seen in past in 2013    Chief Complaint   Patient presents with    oxygen recert.    Sputum Production     clear    COPD     Oct 17,2019- pt lost 40 lbs since Feb 2019 office visit. Pt had fall May 2019- brain met found, dx sm cell lung cancer, post chemo and xrt.    Pt has cough intermittently, uses resp rx hs.  No ox.  Ct in May had complete collapse left lung, aug 2019- ct with left lower lobe collapse.     Has had falls recently - not compliant with instructions to mobilie with assistance.  Getting hh therapy.     Very sob activity, uses symbicort/inhalers with good result.      Sees Dr Atkinson- "   Oncologic History:    Oncologic History 79 year old woman diagnosed with extensive stage small cell carcinoma of the lung (brain involvement).  She was started on carboplatin, etoposide, and atezolizumab.  No focal neurologic deficits and thus WBRT was initially deferred.   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  See comment.  Comment:  The left lung biopsy shows a malignant proliferation of cells with increased nuclear cytoplasmic ratio, stippled chromatin pattern with small amount of cytoplasm. Some of cells show molding and crush artifact. The majority of the cells are relatively small in size, however there are a few areas with larger cells within increased amount of cytoplasm. The cells are arranged in sheets and nests with brisk mitotic activity and areas of necrosis. Immunohistochemical stains reveal the tumor cells to stain positively with cytokeratin AE1/AE3, synaptophysin, chromogranin and TTF-1. Immunohistochemical " "stain for p63 is negative within the tumor cells. Immunostain for cytokeratin 7 shows positive staining in the background lung but is negative within tumor cells. All stains have atisfactory positive negative controls. 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 Correlate clinically.      "  2/18/2019 - seen in [facility] yrs ago.  Off esmokes, doing well , uses symbicort q am, uses nebulizer prn and nightly - no extra.  Took prednisone only once.  Used spiriva - not very effective.  Patient Instructions   Copd is mild range with lung capacity 63-69%   symbicort once or twice daily and nebulizer as needed/bedtime.  May use prednisone if more cough/wheezes.    X rays last yr were good- should do chest xray if lungs worsen.  Stress test for heart set up for April- re check one yr.    9/11/018Quite smoking 2014 after 54 years- pt initially seen as stopped breathing - spent 4 days total- had angiogram at that time.    Breathing had been stable but began worsening - developed cough productive nothing, dry, having wheezes now - had in past but stopped with smoking cessation.  .    Patient Instructions   Copd    Use prednisone- daily for 3 days, may repeat for wheezes.     Use symbicort 2 twice daily , may use regular       Use albuterol inhaler or nebulizer as needed.      Check 02 level and breathing test      Try spiriva 2 daily in a month. Do once breathing good from steroids.     The chief compliant  problem is varies with instablilty at time   PFSH:  Past Medical History:   Diagnosis Date    Acquired hypothyroidism 5/8/2019    Brain tumor     COPD (chronic obstructive pulmonary disease) 5/8/2019    Gastric ulcer     " "Hilar mass     Kidney cysts     left    Pleural effusion          Past Surgical History:   Procedure Laterality Date    ANGIOGRAM, CORONARY, WITH LEFT HEART CATHETERIZATION      endoscope      INSERTION OF TUNNELED CENTRAL VENOUS CATHETER (CVC) WITH SUBCUTANEOUS PORT Left 2019    Procedure: [id number]-H-CATH LEFT NECK;  Surgeon: Reggie Franklin Jr., MD;  Location: [facility] OR [address] [place] [address];  Service: General;  Laterality: Left;    kidney cyst excision and drainage       Social History     Tobacco Use    Smoking status: Former Smoker     Packs/day: 1.00     Years: 54.00     Pack years: 54.00     Types: Cigarettes     Last attempt to quit:      Years since quittin.7    Smokeless tobacco: Never Used   Substance Use Topics    Alcohol use: No     Frequency: Never    Drug use: No     History reviewed. No pertinent family history.  Review of patient's allergies indicates:  No Known Allergies    Performance Status:The patient's activity level is housebound activities.      Review of Systems:  a review of eleven systems covering constitutional, Eye, HEENT, Psych, Respiratory, Cardiac, GI, , Musculoskeletal, Endocrine, Dermatologic was negative except for pertinent findings as listed ABOVE and below:  pertinent positive as above, rest is good , [place], sees cardiology.     Exam:Comprehensive exam done. /70 (BP Location: Right arm, Patient Position: Sitting)   Pulse (!) 121   Ht 5' 1" (1.549 m)   Wt 46.2 kg (101 lb 13.6 oz)   SpO2 99% Comment: on room air  BMI 19.24 kg/m²   Exam included Vitals as listed, and patient's appearance and affect and alertness and mood, oral exam for yeast and hygiene and pharynx lesions and Mallapatti (M) score, neck with inspection for jvd and masses and thyroid abnormalities and lymph nodes (supraclavicular and infraclavicular nodes and axillary also examined and noted if abn), chest exam included symmetry and effort and fremitus and percussion and auscultation, " cardiac exam included rhythm and gallops and murmur and rubs and jvd and edema, abdominal exam for mass and hepatosplenomegaly and tenderness and hernias and bowel sounds, Musculoskeletal exam with muscle tone and posture and mobility/gait and  strength, and skin for rashes and cyanosis and pallor and turgor, extremity for clubbing.  Findings were normal except for pertinent findings listed below:  M1, chest is symmetric, no distress, normal percussion, normal fremitus and good normal breath sounds- decrease left lower lung- cachexia.  No edema    Radiographs (ct chest and cxr) reviewed: view by direct vision   Aug 20, 2019- left lower lung collpased, entire left lung collapsed In May 2019       Copd changes July 2018.prominent vasc markings. 9/27/2018 epa cxr with vague shadow lateral chest wall.       FINDINGS:  The lungs are clear, with normal appearance of pulmonary vasculature and no pleural effusion or pneumothorax.    The cardiac silhouette is normal in size.  The aorta is mildly ectatic, stable.  The hilar and mediastinal contours are unremarkable.    Degenerative disc changes and scoliosis redemonstrated in the spine.      Impression       1. No cardiopulmonary abnormality identified.  2. Stable chronic findings of the aorta and bony structures.      Electronically signed by: Javad Kendall II, MD  Date: 09/27/2018  Time: 09:40       Labs was not done        PFTfev1 63-69 % in 9/20/18,  rv 138%      Plan:  Clinical impression is apparently straight forward and impression with management as below.    Rosita was seen today for oxygen recert., sputum production and copd.    Diagnoses and all orders for this visit:    Chronic obstructive pulmonary disease, unspecified COPD type  -     predniSONE (DELTASONE) 20 MG tablet; Take one daily for 3 days and may repeat for shortness of breath.  -     fluticasone-umeclidin-vilanter (TRELEGY ELLIPTA) 100-62.5-25 mcg DsDv; Inhale 1 puff into the lungs once  daily.    Abnormal CT scan, lung  -     predniSONE (DELTASONE) 20 MG tablet; Take one daily for 3 days and may repeat for shortness of breath.        Follow up in about 6 months (around 4/17/2020), or if symptoms worsen or fail to improve.    Discussed with patient above for education the following:      Patient Instructions   1/2 of left lung collapsed      You do have copd which was not bad last year.    Continue copd therapy  trelegy once daily - gives 24/7 breathing medication benefit  Use ventolin as needed.      Would use prednisone daily for 3 days- not breathing and appetite - may repeat in 2 weeks if helps.      Call if breathing and appetite are dramatically better on prednisone.    Cancer effects seem to be big issue now.   Call if needed or questions.

## 2019-10-17 NOTE — PATIENT INSTRUCTIONS
1/2 of left lung collapsed      You do have copd which was not bad last year.    Continue copd therapy  trelegy once daily - gives 24/7 breathing medication benefit  Use ventolin as needed.      Would use prednisone daily for 3 days- not breathing and appetite - may repeat in 2 weeks if helps.      Call if breathing and appetite are dramatically better on prednisone.    Cancer effects seem to be big issue now.   Call if needed or questions.

## 2019-10-22 ENCOUNTER — TELEPHONE (OUTPATIENT)
Dept: HEMATOLOGY/ONCOLOGY | Facility: CLINIC | Age: 79
End: 2019-10-22

## 2019-10-22 NOTE — PROGRESS NOTES
PATIENT: Rosita Almodovar  MRN: [id number]  DATE: 10/21/2019      Diagnosis:   1. Small cell lung cancer, left    2. 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  She was started on carboplatin, etoposide, and atezolizumab.  No focal neurologic deficits and thus WBRT was initially deferred.  Completed 4 cycles of carbo/etopo/atezo 6/7/19-8/21/19.  Was started on WBRT 3Gy/Fx and completed 10Fx for total dose 30Gy 10/1/19-10/16/19.   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  See comment.  Comment:  The left lung biopsy shows a malignant proliferation of cells with increased nuclear cytoplasmic ratio, stippled chromatin pattern with small amount of cytoplasm. Some of cells show molding and crush artifact. The majority of the cells are relatively small in size, however there are a few areas with larger cells within increased amount of cytoplasm. The cells are arranged in sheets and nests with brisk mitotic activity and areas of necrosis. Immunohistochemical stains reveal the tumor cells to stain positively with cytokeratin AE1/AE3, synaptophysin, chromogranin and TTF-1. Immunohistochemical stain for p63 is negative within the tumor cells. Immunostain for cytokeratin 7 shows positive staining in the background lung but is negative within tumor cells. All stains have atisfactory positive negative controls. 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 Correlate clinically.        Subjective:    Interval History: Ms. Almodovar is here for extensive stage small cell lung cancer s/p 4 cycles of carboplatin etoposide and is on maintenance atezolizumab.    Home health reports that she continues to fall at home.  Patient says she has not noticed improvement in dyspnea on exertion, fatigue, or appetite.   reports she has been having loose stools with boost or ensure TID.  She stopped taking the supplemental nutrition and that has alleviated her loose stools.  Denies fevers, chills, nightsweats, hemoptysis.  Continues to have orthostatic symptoms with ambulation.  Pain in her back managed with tramadol.  She reports it peaks at 10 but goes to 0 after tramadol.  She needs a refill.  Currently using TID.    Her  accompanies her at this visit.     Past Medical History:   Past Medical History:   Diagnosis Date    Acquired hypothyroidism 5/8/2019    Brain tumor     COPD (chronic obstructive pulmonary disease) 5/8/2019    Gastric ulcer     Hilar mass     Kidney cysts     left    Pleural effusion        Past Surgical HIstory:   Past Surgical History:   Procedure Laterality Date    ANGIOGRAM, CORONARY, WITH LEFT HEART CATHETERIZATION      endoscope      INSERTION OF TUNNELED CENTRAL VENOUS CATHETER (CVC) WITH SUBCUTANEOUS PORT Left 6/4/2019    Procedure: [id number] LEFT NECK;  Surgeon: Reggie Franklin Jr., MD;  Location: [facility] OR [address] [place] [address];  Service: General;  Laterality: Left;    kidney cyst excision and drainage         Family History: No family history on file.    Social History:  reports that she quit smoking about 5 years ago. Her smoking use  included cigarettes. She has a 54.00 pack-year smoking history. She has never used smokeless tobacco. She reports that she does not drink alcohol or use drugs.    Allergies:  Review of patient's allergies indicates:   Allergen Reactions    Potassium Nausea Only       Medications:  Current Outpatient Medications   Medication Sig Dispense Refill    albuterol (VENTOLIN HFA) 90 mcg/actuation inhaler Ventolin HFA 90 mcg/actuation aerosol inhaler      atenolol (TENORMIN) 50 MG tablet atenolol 50 mg tablet      clonazePAM (KLONOPIN) 0.5 MG tablet TAKE ONE TABLET BY MOUTH THREE TIMES DAILY AS NEEDED FOR ANXIETY OR for panic attacks  3    cyproheptadine (PERIACTIN) 4 mg tablet Take 1 tablet (4 mg total) by mouth 4 (four) times daily. (Patient not taking: Reported on 10/17/2019) 90 tablet 2    DECARA 50,000 unit capsule Take 50,000 Units by mouth every 7 days.  3    fluticasone-umeclidin-vilanter (TRELEGY ELLIPTA) 100-62.5-25 mcg DsDv Inhale 1 puff into the lungs once daily. 1 each 11    furosemide (LASIX) 20 MG tablet TAKE ONE TABLET BY MOUTH EVERY DAY 30 tablet 3    levothyroxine (SYNTHROID) 50 MCG tablet Take 50 mcg by mouth every morning.  3    lidocaine-prilocaine (EMLA) cream Apply to port site 1 hour prior to chemotherapy and cover with saran wrap (Patient not taking: Reported on 10/17/2019) 30 g 3    lisinopril (PRINIVIL,ZESTRIL) 5 MG tablet Take 5 mg by mouth once daily.       LORazepam (ATIVAN) 1 MG tablet Take 1 tablet (1 mg total) by mouth as needed for Anxiety (Take 30 minutes prior to daily radiation treatment). (Patient not taking: Reported on 10/17/2019) 15 tablet 0    midodrine (PROAMATINE) 5 MG Tab       ondansetron (ZOFRAN-ODT) 8 MG TbDL Dissolve 1 tablet (8 mg total) under tongue every 8 (eight) hours as needed (nausea). (Patient not taking: Reported on 10/17/2019) 30 tablet 3    pantoprazole (PROTONIX) 40 MG tablet TAKE ONE TABLET BY MOUTH EVERY DAY FOR stomach  3    pravastatin  (PRAVACHOL) 20 MG tablet Take 20 mg by mouth once daily.       predniSONE (DELTASONE) 20 MG tablet Take one daily for 3 days and may repeat for shortness of breath. 15 tablet 0    senna-docusate 8.6-50 mg (PERICOLACE) 8.6-50 mg per tablet Take 2 tablets by mouth once daily. 60 tablet 11    sotalol (BETAPACE) 80 MG tablet TAKE 1/2 TABLET BY MOUTH DAILY 15 tablet 1    tiotropium bromide 2.5 mcg/actuation Mist Inhale 5 mcg into the lungs once daily. Controller (Patient not taking: Reported on 10/17/2019) 4 g 5    traMADol (ULTRAM) 50 mg tablet Take 50 mg by mouth every 12 (twelve) hours as needed.   3     No current facility-administered medications for this visit.      Facility-Administered Medications Ordered in Other Visits   Medication Dose Route Frequency Provider Last Rate Last Dose    alteplase injection 2 mg  2 mg Intra-Catheter PRN Stephan Atkinson MD   2 mg at 07/05/19 1408       Review of Systems   Constitutional: Positive for activity change, appetite change, fatigue and unexpected weight change. Negative for chills and fever.   HENT: Negative for nosebleeds.    Eyes: Negative for visual disturbance.   Respiratory: Positive for shortness of breath. Negative for cough.    Cardiovascular: Negative for chest pain and leg swelling.   Gastrointestinal: Positive for nausea. Negative for abdominal distention, abdominal pain, constipation and diarrhea.   Endocrine: Negative for cold intolerance and heat intolerance.   Genitourinary: Negative for dysuria.   Musculoskeletal: Positive for back pain (chronic unchanged).   Skin: Negative for color change and rash.   Neurological: Positive for dizziness, weakness and light-headedness. Negative for numbness.   Hematological: Negative for adenopathy.   Psychiatric/Behavioral: Negative for confusion.       ECOG Performance Status:  3  Objective:      Vitals:   Vitals:    10/23/19 0742   BP: 124/63   BP Location: Right arm   Patient Position: Sitting   BP Method: Large  "(Automatic)   Pulse: 95   Resp: 16   Temp: 97.4 °F (36.3 °C)   TempSrc: Oral   SpO2: 98%   Weight: 46.2 kg (101 lb 13.6 oz)   Height: 5' 1" (1.549 m)     BMI: Body mass index is 19.24 kg/m².   Wt Readings from Last 10 Encounters:   10/17/19 46.2 kg (101 lb 13.6 oz)   10/02/19 48.2 kg (106 lb 4.2 oz)   09/16/19 50.3 kg (111 lb)   09/13/19 50.5 kg (111 lb 6.4 oz)   09/11/19 49.7 kg (109 lb 9.1 oz)   09/11/19 49.7 kg (109 lb 9.1 oz)   09/06/19 50.1 kg (110 lb 7.2 oz)   09/05/19 49.5 kg (109 lb 2 oz)   08/22/19 50.7 kg (111 lb 12.4 oz)   08/21/19 50.7 kg (111 lb 12.4 oz)         Physical Exam   Constitutional: She appears well-developed.   HENT:   Head: Normocephalic.   Eyes: Pupils are equal, round, and reactive to light. EOM are normal. No scleral icterus.   Neck: Normal range of motion. No tracheal deviation present.   Cardiovascular: Normal rate, regular rhythm and normal heart sounds. Exam reveals no gallop and no friction rub.   No murmur heard.  Pulmonary/Chest: Effort normal and breath sounds normal. No respiratory distress. She has no wheezes. She has no rales.   Ecchymosis left upper back   Abdominal: Soft. Bowel sounds are normal. She exhibits no distension and no mass. There is no tenderness. There is no guarding.   Musculoskeletal: Normal range of motion. She exhibits no edema.   Lymphadenopathy:     She has no cervical adenopathy.   Neurological: She is alert.   Skin: Skin is warm and dry.       Laboratory Data:     Recent Results (from the past 168 hour(s))   Comprehensive metabolic panel    Collection Time: 10/22/19 10:31 AM   Result Value Ref Range    Sodium 130 (L) 136 - 145 mmol/L    Potassium 3.9 3.5 - 5.1 mmol/L    Chloride 98 (L) 101 - 111 mmol/L    CO2 24 23 - 29 mmol/L    Glucose 149 (H) 74 - 118 mg/dL    BUN, Bld 12 8 - 23 mg/dL    Creatinine 0.7 0.5 - 1.4 mg/dL    Calcium 9.0 8.6 - 10.0 mg/dL    Total Protein 6.4 6.0 - 8.4 g/dL    Albumin 2.9 (L) 3.5 - 5.2 g/dL    Total Bilirubin 0.6 0.3 - 1.2 " mg/dL    Alkaline Phosphatase 115 38 - 126 U/L    AST 31 15 - 41 U/L    ALT 11 (L) 14 - 54 U/L    Anion Gap 8 8 - 16 mmol/L    eGFR if African American >60.0 >60 mL/min/1.73 m^2    eGFR if non African American >60.0 >60 mL/min/1.73 m^2   CBC auto differential    Collection Time: 10/22/19 10:31 AM   Result Value Ref Range    WBC 6.20 3.90 - 12.70 K/uL    RBC 3.59 (L) 4.00 - 5.40 M/uL    Hemoglobin 10.3 (L) 12.0 - 16.0 g/dL    Hematocrit 31.7 (L) 37.0 - 48.5 %    Mean Corpuscular Volume 88 82 - 98 fL    Mean Corpuscular Hemoglobin 28.7 27.0 - 31.0 pg    Mean Corpuscular Hemoglobin Conc 32.5 32.0 - 36.0 g/dL    RDW 17.5 (H) 11.5 - 14.5 %    Platelets 452 (H) 150 - 350 K/uL    MPV 7.1 (L) 9.2 - 12.9 fL    Gran # (ANC) 4.8 1.8 - 7.7 K/uL    Lymph # 0.9 (L) 1.0 - 4.8 K/uL    Mono # 0.5 0.3 - 1.0 K/uL    Eos # 0.0 0.0 - 0.5 K/uL    Baso # 0.00 0.00 - 0.20 K/uL    Gran% 77.1 (H) 38.0 - 73.0 %    Lymph% 13.7 (L) 18.0 - 48.0 %    Mono% 8.4 4.0 - 15.0 %    Eosinophil% 0.4 0.0 - 8.0 %    Basophil% 0.4 0.0 - 1.9 %    Differential Method Automated    T4, free    Collection Time: 10/22/19 10:31 AM   Result Value Ref Range    Free T4 1.11 0.61 - 1.12 ng/dL   TSH    Collection Time: 10/22/19 10:31 AM   Result Value Ref Range    TSH 1.77 0.45 - 5.33 uIU/mL   Hemoglobin A1c    Collection Time: 10/22/19 10:31 AM   Result Value Ref Range    Hemoglobin A1C 5.0 4.0 - 5.6 %    Estimated Avg Glucose 97 68 - 131 mg/dL     Imaging:           Assessment:       1. Small cell lung cancer, left    2. Secondary malignant neoplasm of brain           Plan:         1. 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  While treating small cell lung cancer is the priority, typical approach to purely large cell neuroendocrine is similar to that of NSCLC.  Small cell lung cancer treatment overlaps with that of NSCLC with platinum and immunotherapy components.  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  Was  started on carboplatin, etoposide and atezolizumab.  S/p 3 cycles, CT imaging 8/20/2019 showed interval improvement.  Has completed 4 cycles of carboplatin, etoposide, and atezolizumab.  -Continue HH/PT at home  -s/p port placement 6/4/19  -proceed with atezolizumab cycle 7 today  --Re-image with CT chest/abdomen/pelvis and MRI brain in 3 weeks and have her return to clinic  -Return to clinic 11/13/19 for possible continuation of maintenance atezolizumab.  Expressed to her concern regarding lack of improvement with her symptoms and performance status.  -s/p WBRT 30Gy/10fractions with Dr. Quintana.    --Monitor TSH, fT4, and HbA1c monthly  -Refill tramadol today  -Advised lactose free boost/ensure or using lactaid prior to drinking boost/ensure to see if it alleviates loose stools.      Advance Care Planning I initiated the process of advance care planning at prior visit and explained the importance of this process to the patient.  Then the patient received detailed information about the importance of designating a Health Care Power of  (HCPOA). she was instructed to communicate with this person about their wishes for future healthcare, should she become sick and lose decision-making capacity. The patient has previously appointed a HCPOA. After our discussion, the patient has decided to complete a HCPOA and has appointed her significant other and NAME:Edenilson Almodovar   I spent a total time of 16 minutes discussing this issue with the patient.     No orders of the defined types were placed in this encounter.        Stephan Atkinson MD  Hematology Oncology Fellow PGY6        ATTENDING NOTE, ONCOLOGY CLINIC    As above.  Patient seen and examined, chart reviewed.  Appears comfortable, in NAD. However, her ECOG PS is 3.   Lungs have scattered ronchi  Abdomen is soft, nontender.  Labs reviewed.    PLAN  She will receive ehr keytruda infusion today.  Prior to her next visit she will eb restaged.  I have introduced  the concept of hospice, and I suspect she will need to transition to hospice in the near future.  Her multiple questions were answered to her satisfaction.'      Bjorn Rodriguez MD

## 2019-10-22 NOTE — TELEPHONE ENCOUNTER
Spoke with Yasmine from [facility] who relayed that patient had 2 falls over the weekend. On Saturday her  had left to go to the store and when he returned the patient was on the floor. She tried to get up but did not have the walker nearby and on Sunday patient fell off the chair when she tried to get out of it by herself. Patient has be advised to wait for someone to help her before she tries to get up by herself from now on to prevent any more falls as well as make sure she has her walker close to her when she does have assistance getting up. Yasmine wanted to make Dr Mccain aware of this information. Informed Yasmine would let Dr Mccain know what was going on?       ----- Message from Katherine Dos Santos sent at 10/22/2019 11:33 AM CDT -----  Contact: yasmine [facility]   Yasmine [facility] called to speak with nurse about pt had a fall over the weekend and with no injuries have some questions   Callback [phone]  Thank You  EDWIN Dos Santos

## 2019-10-23 ENCOUNTER — INFUSION (OUTPATIENT)
Dept: INFUSION THERAPY | Facility: HOSPITAL | Age: 79
End: 2019-10-23
Attending: INTERNAL MEDICINE
Payer: MEDICARE

## 2019-10-23 ENCOUNTER — OFFICE VISIT (OUTPATIENT)
Dept: HEMATOLOGY/ONCOLOGY | Facility: CLINIC | Age: 79
End: 2019-10-23
Payer: MEDICARE

## 2019-10-23 VITALS
OXYGEN SATURATION: 98 % | BODY MASS INDEX: 19.23 KG/M2 | SYSTOLIC BLOOD PRESSURE: 124 MMHG | RESPIRATION RATE: 16 BRPM | TEMPERATURE: 97 F | HEIGHT: 61 IN | HEART RATE: 95 BPM | DIASTOLIC BLOOD PRESSURE: 63 MMHG | WEIGHT: 101.88 LBS

## 2019-10-23 VITALS
HEART RATE: 94 BPM | RESPIRATION RATE: 18 BRPM | HEIGHT: 61 IN | DIASTOLIC BLOOD PRESSURE: 79 MMHG | TEMPERATURE: 97 F | BODY MASS INDEX: 19.23 KG/M2 | OXYGEN SATURATION: 98 % | SYSTOLIC BLOOD PRESSURE: 122 MMHG | WEIGHT: 101.88 LBS

## 2019-10-23 DIAGNOSIS — E83.42 HYPOMAGNESEMIA: Primary | ICD-10-CM

## 2019-10-23 DIAGNOSIS — C34.92 SMALL CELL LUNG CANCER, LEFT: ICD-10-CM

## 2019-10-23 DIAGNOSIS — C79.31 SECONDARY MALIGNANT NEOPLASM OF BRAIN: ICD-10-CM

## 2019-10-23 DIAGNOSIS — C34.92 SMALL CELL LUNG CANCER, LEFT: Primary | ICD-10-CM

## 2019-10-23 PROCEDURE — 99999 PR PBB SHADOW E&M-EST. PATIENT-LVL V: CPT | Mod: PBBFAC,GC,, | Performed by: STUDENT IN AN ORGANIZED HEALTH CARE EDUCATION/TRAINING PROGRAM

## 2019-10-23 PROCEDURE — 99999 PR PBB SHADOW E&M-EST. PATIENT-LVL V: ICD-10-PCS | Mod: PBBFAC,GC,, | Performed by: STUDENT IN AN ORGANIZED HEALTH CARE EDUCATION/TRAINING PROGRAM

## 2019-10-23 PROCEDURE — 99215 OFFICE O/P EST HI 40 MIN: CPT | Mod: S$PBB,GC,, | Performed by: STUDENT IN AN ORGANIZED HEALTH CARE EDUCATION/TRAINING PROGRAM

## 2019-10-23 PROCEDURE — 96413 CHEMO IV INFUSION 1 HR: CPT

## 2019-10-23 PROCEDURE — A4216 STERILE WATER/SALINE, 10 ML: HCPCS | Performed by: INTERNAL MEDICINE

## 2019-10-23 PROCEDURE — 99215 OFFICE O/P EST HI 40 MIN: CPT | Mod: PBBFAC,25 | Performed by: STUDENT IN AN ORGANIZED HEALTH CARE EDUCATION/TRAINING PROGRAM

## 2019-10-23 PROCEDURE — 96375 TX/PRO/DX INJ NEW DRUG ADDON: CPT

## 2019-10-23 PROCEDURE — 99215 PR OFFICE/OUTPT VISIT, EST, LEVL V, 40-54 MIN: ICD-10-PCS | Mod: S$PBB,GC,, | Performed by: STUDENT IN AN ORGANIZED HEALTH CARE EDUCATION/TRAINING PROGRAM

## 2019-10-23 PROCEDURE — 25000003 PHARM REV CODE 250: Performed by: INTERNAL MEDICINE

## 2019-10-23 PROCEDURE — 63600175 PHARM REV CODE 636 W HCPCS: Performed by: INTERNAL MEDICINE

## 2019-10-23 RX ORDER — SODIUM CHLORIDE 0.9 % (FLUSH) 0.9 %
10 SYRINGE (ML) INJECTION
Status: DISCONTINUED | OUTPATIENT
Start: 2019-10-23 | End: 2019-10-23 | Stop reason: HOSPADM

## 2019-10-23 RX ORDER — TRAMADOL HYDROCHLORIDE 50 MG/1
50 TABLET ORAL EVERY 6 HOURS PRN
Qty: 120 TABLET | Refills: 0 | Status: ON HOLD | OUTPATIENT
Start: 2019-10-23 | End: 2019-11-04 | Stop reason: HOSPADM

## 2019-10-23 RX ORDER — ONDANSETRON 2 MG/ML
8 INJECTION INTRAMUSCULAR; INTRAVENOUS
Status: CANCELLED | OUTPATIENT
Start: 2019-10-23

## 2019-10-23 RX ORDER — ONDANSETRON 2 MG/ML
8 INJECTION INTRAMUSCULAR; INTRAVENOUS
Status: COMPLETED | OUTPATIENT
Start: 2019-10-23 | End: 2019-10-23

## 2019-10-23 RX ORDER — SODIUM CHLORIDE 0.9 % (FLUSH) 0.9 %
10 SYRINGE (ML) INJECTION
Status: CANCELLED | OUTPATIENT
Start: 2019-10-23

## 2019-10-23 RX ORDER — HEPARIN 100 UNIT/ML
500 SYRINGE INTRAVENOUS
Status: DISCONTINUED | OUTPATIENT
Start: 2019-10-23 | End: 2019-10-23 | Stop reason: HOSPADM

## 2019-10-23 RX ORDER — TRAMADOL HYDROCHLORIDE 50 MG/1
50 TABLET ORAL EVERY 6 HOURS PRN
Qty: 120 TABLET | Refills: 0 | Status: SHIPPED | OUTPATIENT
Start: 2019-10-23 | End: 2019-10-23

## 2019-10-23 RX ORDER — CLOPIDOGREL BISULFATE 75 MG/1
75 TABLET ORAL DAILY
Refills: 2 | COMMUNITY
Start: 2019-10-17 | End: 2019-11-01

## 2019-10-23 RX ORDER — HEPARIN 100 UNIT/ML
500 SYRINGE INTRAVENOUS
Status: CANCELLED | OUTPATIENT
Start: 2019-10-23

## 2019-10-23 RX ADMIN — SODIUM CHLORIDE: 9 INJECTION, SOLUTION INTRAVENOUS at 09:10

## 2019-10-23 RX ADMIN — ONDANSETRON 8 MG: 2 INJECTION, SOLUTION INTRAMUSCULAR; INTRAVENOUS at 09:10

## 2019-10-23 RX ADMIN — ATEZOLIZUMAB 1200 MG: 1200 INJECTION, SOLUTION INTRAVENOUS at 09:10

## 2019-10-23 RX ADMIN — Medication 10 ML: at 10:10

## 2019-10-23 RX ADMIN — HEPARIN SODIUM (PORCINE) LOCK FLUSH IV SOLN 100 UNIT/ML 500 UNITS: 100 SOLUTION at 10:10

## 2019-10-23 NOTE — PLAN OF CARE
Labs , hx, and medications reviewed. Assessment completed. Discussed plan of care with patient. Patient in agreement. VSS.  RD consult placed.  Chair reclined and warm blanket and snack offered. Will cont to monitor

## 2019-10-23 NOTE — Clinical Note
1. 11/12/19 cmp, cbc then ct chest abdomen pelvis with contrast and MRI brain.  2. Follow up with me 11/13/19 to discuss results.  Thanks -e

## 2019-10-23 NOTE — PLAN OF CARE
Pt tolerated tecentriq without adverse effects. VSS. Provided AVS & verbalized understanding of RTC date. DC with family via wheelchair.

## 2019-10-25 ENCOUNTER — TELEPHONE (OUTPATIENT)
Dept: RADIATION ONCOLOGY | Facility: CLINIC | Age: 79
End: 2019-10-25

## 2019-10-25 NOTE — TELEPHONE ENCOUNTER
Left message to check on pt after finishing XRT in 1 week. Follow up scan and appointment in 3 months to be mailed. ----- Message from Mary Kay Daley RN sent at 10/16/2019  2:13 PM CDT -----  Regardin week call  1 week call and 3 month follow up with MRI

## 2019-10-29 ENCOUNTER — TELEPHONE (OUTPATIENT)
Dept: HEMATOLOGY/ONCOLOGY | Facility: CLINIC | Age: 79
End: 2019-10-29

## 2019-10-29 NOTE — TELEPHONE ENCOUNTER
Called and left message approving extension and told them to call back if additional documentation needed    Stephan Atkinson MD  Hematology Oncology Fellow PGY6  Pager [phone]      ----- Message from Gela Graham RN sent at 10/29/2019  3:21 PM CDT -----  Contact: Giselle (PT [facility])      ----- Message -----  From: Mabel Torres  Sent: 10/29/2019   2:47 PM CDT  To: [facility] Staff    Staff Message     Caller name: Giselle    Reason for call: Calling to get an extension on physical therapy, needs it to be extended to 2 wk 1 and then 3 wk 2.        Communication Preference: [phone]    Additional Information:

## 2019-10-31 ENCOUNTER — TELEPHONE (OUTPATIENT)
Dept: HEMATOLOGY/ONCOLOGY | Facility: CLINIC | Age: 79
End: 2019-10-31

## 2019-10-31 NOTE — TELEPHONE ENCOUNTER
Returned call to Physical therapist to discuss request. Wanting to offer treatments TID for the next 3 weeks. Verbal order provided to extend therapy. She expressed gratitude.

## 2019-10-31 NOTE — TELEPHONE ENCOUNTER
----- Message from Katherine Dos Santos sent at 10/31/2019 10:56 AM CDT -----  Contact: Amelia DUPONT [facility]   Amelia PT [facility] called to speak with nurse about getting a order extension for PT   Callback#[phone]  Thank You  EDWIN Dos Santos

## 2019-11-01 ENCOUNTER — TELEPHONE (OUTPATIENT)
Dept: HOME HEALTH SERVICES | Facility: HOSPITAL | Age: 79
End: 2019-11-01

## 2019-11-01 ENCOUNTER — HOSPITAL ENCOUNTER (INPATIENT)
Facility: HOSPITAL | Age: 79
LOS: 3 days | Discharge: HOSPICE | DRG: 180 | End: 2019-11-04
Attending: EMERGENCY MEDICINE | Admitting: EMERGENCY MEDICINE
Payer: MEDICARE

## 2019-11-01 DIAGNOSIS — C34.92 SMALL CELL LUNG CANCER, LEFT: ICD-10-CM

## 2019-11-01 DIAGNOSIS — C34.92 PRIMARY MALIGNANT NEOPLASM OF LEFT LUNG METASTATIC TO OTHER SITE: ICD-10-CM

## 2019-11-01 DIAGNOSIS — C34.90 SMALL CELL LUNG CANCER: ICD-10-CM

## 2019-11-01 DIAGNOSIS — C79.31 SECONDARY MALIGNANT NEOPLASM OF BRAIN: ICD-10-CM

## 2019-11-01 DIAGNOSIS — R53.83 FATIGUE: ICD-10-CM

## 2019-11-01 DIAGNOSIS — S12.031A CLOSED NONDISPLACED FRACTURE OF POSTERIOR ARCH OF FIRST CERVICAL VERTEBRA, INITIAL ENCOUNTER: Primary | ICD-10-CM

## 2019-11-01 DIAGNOSIS — J44.9 CHRONIC OBSTRUCTIVE PULMONARY DISEASE, UNSPECIFIED COPD TYPE: Chronic | ICD-10-CM

## 2019-11-01 DIAGNOSIS — J44.1 COPD EXACERBATION: ICD-10-CM

## 2019-11-01 DIAGNOSIS — J44.9 CHRONIC OBSTRUCTIVE PULMONARY DISEASE, UNSPECIFIED COPD TYPE: ICD-10-CM

## 2019-11-01 PROBLEM — N39.0 UTI (URINARY TRACT INFECTION): Status: ACTIVE | Noted: 2019-11-01

## 2019-11-01 LAB
BACTERIA #/AREA URNS AUTO: ABNORMAL /HPF
BILIRUB UR QL STRIP: NEGATIVE
CLARITY UR REFRACT.AUTO: ABNORMAL
COLOR UR AUTO: ABNORMAL
GLUCOSE UR QL STRIP: NEGATIVE
HGB UR QL STRIP: NEGATIVE
HYALINE CASTS UR QL AUTO: 3 /LPF
KETONES UR QL STRIP: ABNORMAL
LEUKOCYTE ESTERASE UR QL STRIP: ABNORMAL
MICROSCOPIC COMMENT: ABNORMAL
NITRITE UR QL STRIP: POSITIVE
PH UR STRIP: 5 [PH] (ref 5–8)
PROT UR QL STRIP: NEGATIVE
SP GR UR STRIP: 1.02 (ref 1–1.03)
SQUAMOUS #/AREA URNS AUTO: 2 /HPF
URN SPEC COLLECT METH UR: ABNORMAL
WBC #/AREA URNS AUTO: 14 /HPF (ref 0–5)

## 2019-11-01 PROCEDURE — 22310 PR CLOSED TREAT VERT BODY FRACT: ICD-10-PCS | Mod: ,,, | Performed by: ORTHOPAEDIC SURGERY

## 2019-11-01 PROCEDURE — 99222 PR INITIAL HOSPITAL CARE,LEVL II: ICD-10-PCS | Mod: 57,,, | Performed by: ORTHOPAEDIC SURGERY

## 2019-11-01 PROCEDURE — 87186 SC STD MICRODIL/AGAR DIL: CPT

## 2019-11-01 PROCEDURE — 25000242 PHARM REV CODE 250 ALT 637 W/ HCPCS: Performed by: STUDENT IN AN ORGANIZED HEALTH CARE EDUCATION/TRAINING PROGRAM

## 2019-11-01 PROCEDURE — 87086 URINE CULTURE/COLONY COUNT: CPT

## 2019-11-01 PROCEDURE — 87088 URINE BACTERIA CULTURE: CPT

## 2019-11-01 PROCEDURE — 99285 EMERGENCY DEPT VISIT HI MDM: CPT | Mod: 25

## 2019-11-01 PROCEDURE — 81001 URINALYSIS AUTO W/SCOPE: CPT

## 2019-11-01 PROCEDURE — P9612 CATHETERIZE FOR URINE SPEC: HCPCS

## 2019-11-01 PROCEDURE — 11000001 HC ACUTE MED/SURG PRIVATE ROOM

## 2019-11-01 PROCEDURE — 99285 EMERGENCY DEPT VISIT HI MDM: CPT | Mod: ,,, | Performed by: EMERGENCY MEDICINE

## 2019-11-01 PROCEDURE — 25000003 PHARM REV CODE 250: Performed by: STUDENT IN AN ORGANIZED HEALTH CARE EDUCATION/TRAINING PROGRAM

## 2019-11-01 PROCEDURE — 63600175 PHARM REV CODE 636 W HCPCS: Performed by: EMERGENCY MEDICINE

## 2019-11-01 PROCEDURE — 99285 PR EMERGENCY DEPT VISIT,LEVEL V: ICD-10-PCS | Mod: ,,, | Performed by: EMERGENCY MEDICINE

## 2019-11-01 PROCEDURE — 87077 CULTURE AEROBIC IDENTIFY: CPT

## 2019-11-01 PROCEDURE — 99222 1ST HOSP IP/OBS MODERATE 55: CPT | Mod: GC,,, | Performed by: INTERNAL MEDICINE

## 2019-11-01 PROCEDURE — 99222 PR INITIAL HOSPITAL CARE,LEVL II: ICD-10-PCS | Mod: GC,,, | Performed by: INTERNAL MEDICINE

## 2019-11-01 PROCEDURE — 22310 CLOSED TX VERT FX W/O MANJ: CPT | Mod: ,,, | Performed by: ORTHOPAEDIC SURGERY

## 2019-11-01 PROCEDURE — 99222 1ST HOSP IP/OBS MODERATE 55: CPT | Mod: 57,,, | Performed by: ORTHOPAEDIC SURGERY

## 2019-11-01 RX ORDER — OXYCODONE HYDROCHLORIDE 5 MG/1
5 TABLET ORAL EVERY 4 HOURS PRN
Status: DISCONTINUED | OUTPATIENT
Start: 2019-11-01 | End: 2019-11-04 | Stop reason: HOSPADM

## 2019-11-01 RX ORDER — LEVOTHYROXINE SODIUM 50 UG/1
50 TABLET ORAL
Status: DISCONTINUED | OUTPATIENT
Start: 2019-11-02 | End: 2019-11-04 | Stop reason: HOSPADM

## 2019-11-01 RX ORDER — SODIUM CHLORIDE 0.9 % (FLUSH) 0.9 %
10 SYRINGE (ML) INJECTION
Status: DISCONTINUED | OUTPATIENT
Start: 2019-11-01 | End: 2019-11-04 | Stop reason: HOSPADM

## 2019-11-01 RX ORDER — ONDANSETRON 8 MG/1
8 TABLET, ORALLY DISINTEGRATING ORAL EVERY 8 HOURS PRN
Status: DISCONTINUED | OUTPATIENT
Start: 2019-11-01 | End: 2019-11-04 | Stop reason: HOSPADM

## 2019-11-01 RX ORDER — AMOXICILLIN 250 MG
2 CAPSULE ORAL DAILY
Status: DISCONTINUED | OUTPATIENT
Start: 2019-11-01 | End: 2019-11-01

## 2019-11-01 RX ORDER — CLONAZEPAM 0.5 MG/1
0.5 TABLET ORAL 3 TIMES DAILY
Status: DISCONTINUED | OUTPATIENT
Start: 2019-11-01 | End: 2019-11-04 | Stop reason: HOSPADM

## 2019-11-01 RX ORDER — PANTOPRAZOLE SODIUM 40 MG/1
40 TABLET, DELAYED RELEASE ORAL DAILY
Status: DISCONTINUED | OUTPATIENT
Start: 2019-11-01 | End: 2019-11-04 | Stop reason: HOSPADM

## 2019-11-01 RX ORDER — AMOXICILLIN 250 MG
2 CAPSULE ORAL DAILY PRN
Status: DISCONTINUED | OUTPATIENT
Start: 2019-11-01 | End: 2019-11-04 | Stop reason: HOSPADM

## 2019-11-01 RX ORDER — CYPROHEPTADINE HYDROCHLORIDE 4 MG/1
4 TABLET ORAL 4 TIMES DAILY
Status: DISCONTINUED | OUTPATIENT
Start: 2019-11-01 | End: 2019-11-04 | Stop reason: HOSPADM

## 2019-11-01 RX ORDER — MIDODRINE HYDROCHLORIDE 5 MG/1
5 TABLET ORAL DAILY
Status: DISCONTINUED | OUTPATIENT
Start: 2019-11-01 | End: 2019-11-04 | Stop reason: HOSPADM

## 2019-11-01 RX ORDER — SULFAMETHOXAZOLE AND TRIMETHOPRIM 400; 80 MG/1; MG/1
1 TABLET ORAL DAILY
Status: DISCONTINUED | OUTPATIENT
Start: 2019-11-01 | End: 2019-11-02

## 2019-11-01 RX ORDER — ALBUTEROL SULFATE 90 UG/1
1 AEROSOL, METERED RESPIRATORY (INHALATION) EVERY 4 HOURS PRN
Status: DISCONTINUED | OUTPATIENT
Start: 2019-11-01 | End: 2019-11-04 | Stop reason: HOSPADM

## 2019-11-01 RX ORDER — GLUCAGON 1 MG
1 KIT INJECTION
Status: DISCONTINUED | OUTPATIENT
Start: 2019-11-01 | End: 2019-11-04 | Stop reason: HOSPADM

## 2019-11-01 RX ORDER — SODIUM CHLORIDE 9 MG/ML
1000 INJECTION, SOLUTION INTRAVENOUS
Status: COMPLETED | OUTPATIENT
Start: 2019-11-01 | End: 2019-11-01

## 2019-11-01 RX ORDER — FLUTICASONE FUROATE AND VILANTEROL 100; 25 UG/1; UG/1
1 POWDER RESPIRATORY (INHALATION) DAILY
Status: DISCONTINUED | OUTPATIENT
Start: 2019-11-01 | End: 2019-11-04 | Stop reason: HOSPADM

## 2019-11-01 RX ORDER — PRAVASTATIN SODIUM 20 MG/1
20 TABLET ORAL DAILY
Status: DISCONTINUED | OUTPATIENT
Start: 2019-11-01 | End: 2019-11-01

## 2019-11-01 RX ORDER — IBUPROFEN 200 MG
16 TABLET ORAL
Status: DISCONTINUED | OUTPATIENT
Start: 2019-11-01 | End: 2019-11-04 | Stop reason: HOSPADM

## 2019-11-01 RX ORDER — IBUPROFEN 200 MG
24 TABLET ORAL
Status: DISCONTINUED | OUTPATIENT
Start: 2019-11-01 | End: 2019-11-04 | Stop reason: HOSPADM

## 2019-11-01 RX ADMIN — PANTOPRAZOLE SODIUM 40 MG: 40 TABLET, DELAYED RELEASE ORAL at 04:11

## 2019-11-01 RX ADMIN — SODIUM CHLORIDE 1000 ML: 0.9 INJECTION, SOLUTION INTRAVENOUS at 12:11

## 2019-11-01 RX ADMIN — MIDODRINE HYDROCHLORIDE 5 MG: 5 TABLET ORAL at 04:11

## 2019-11-01 RX ADMIN — CYPROHEPTADINE HYDROCHLORIDE 4 MG: 4 TABLET ORAL at 04:11

## 2019-11-01 RX ADMIN — OXYCODONE HYDROCHLORIDE 5 MG: 5 TABLET ORAL at 09:11

## 2019-11-01 RX ADMIN — PRAVASTATIN SODIUM 20 MG: 20 TABLET ORAL at 04:11

## 2019-11-01 RX ADMIN — CLONAZEPAM 0.5 MG: 0.5 TABLET ORAL at 04:11

## 2019-11-01 RX ADMIN — CYPROHEPTADINE HYDROCHLORIDE 4 MG: 4 TABLET ORAL at 08:11

## 2019-11-01 RX ADMIN — SULFAMETHOXAZOLE AND TRIMETHOPRIM 1 TABLET: 400; 80 TABLET ORAL at 06:11

## 2019-11-01 RX ADMIN — CLONAZEPAM 0.5 MG: 0.5 TABLET ORAL at 08:11

## 2019-11-01 RX ADMIN — FLUTICASONE FUROATE AND VILANTEROL TRIFENATATE 1 PUFF: 100; 25 POWDER RESPIRATORY (INHALATION) at 06:11

## 2019-11-01 NOTE — ASSESSMENT & PLAN NOTE
Rosita Almodovar is a 79 y.o. female presents s/p mechanical fall from standing with head trauma and neck pain. CT showing possible C1 right posterior arch fracture, unclear if this is a true fracture or an anatomic variant.    - Will treat conservatively, no surgical intervention at this time  - Aspen collar for 1 week  - Will f/u in clinic in 2 weeks  - No restrictions  - Pt discussed with staff

## 2019-11-01 NOTE — SUBJECTIVE & OBJECTIVE
Past Medical History:   Diagnosis Date    Acquired hypothyroidism 5/8/2019    Brain tumor     COPD (chronic obstructive pulmonary disease) 5/8/2019    Gastric ulcer     Hilar mass     Kidney cysts     left    Pleural effusion        Past Surgical History:   Procedure Laterality Date    ANGIOGRAM, CORONARY, WITH LEFT HEART CATHETERIZATION      endoscope      INSERTION OF TUNNELED CENTRAL VENOUS CATHETER (CVC) WITH SUBCUTANEOUS PORT Left 6/4/2019    Procedure: [id number] LEFT NECK;  Surgeon: Reggie Franklin Jr., MD;  Location: [facility] OR [address] [place] [address];  Service: General;  Laterality: Left;    kidney cyst excision and drainage         Review of patient's allergies indicates:   Allergen Reactions    Potassium Nausea Only       No current facility-administered medications for this encounter.      Current Outpatient Medications   Medication Sig    albuterol (VENTOLIN HFA) 90 mcg/actuation inhaler Ventolin HFA 90 mcg/actuation aerosol inhaler    cyproheptadine (PERIACTIN) 4 mg tablet Take 1 tablet (4 mg total) by mouth 4 (four) times daily.    fluticasone-umeclidin-vilanter (TRELEGY ELLIPTA) 100-62.5-25 mcg DsDv Inhale 1 puff into the lungs once daily.    furosemide (LASIX) 20 MG tablet TAKE ONE TABLET BY MOUTH EVERY DAY    levothyroxine (SYNTHROID) 50 MCG tablet TAKE ONE TABLET BY MOUTH EVERY IN THE MORNING BEFORE BREAKFAST    atenolol (TENORMIN) 50 MG tablet atenolol 50 mg tablet    clonazePAM (KLONOPIN) 0.5 MG tablet TAKE ONE TABLET BY MOUTH THREE TIMES DAILY AS NEEDED FOR ANXIETY OR for panic attacks    clopidogrel (PLAVIX) 75 mg tablet Take 75 mg by mouth once daily.    DECARA 50,000 unit capsule Take 50,000 Units by mouth every 7 days.    lidocaine-prilocaine (EMLA) cream Apply to port site 1 hour prior to chemotherapy and cover with saran wrap    lisinopril (PRINIVIL,ZESTRIL) 5 MG tablet Take 5 mg by mouth once daily.     LORazepam (ATIVAN) 1 MG tablet Take 1 tablet (1 mg total) by  "mouth as needed for Anxiety (Take 30 minutes prior to daily radiation treatment). (Patient not taking: Reported on 10/17/2019)    midodrine (PROAMATINE) 5 MG Tab     ondansetron (ZOFRAN-ODT) 8 MG TbDL Dissolve 1 tablet (8 mg total) under tongue every 8 (eight) hours as needed (nausea).    pantoprazole (PROTONIX) 40 MG tablet TAKE ONE TABLET BY MOUTH EVERY DAY FOR stomach    pravastatin (PRAVACHOL) 20 MG tablet Take 20 mg by mouth once daily.     predniSONE (DELTASONE) 20 MG tablet Take one daily for 3 days and may repeat for shortness of breath.    senna-docusate 8.6-50 mg (PERICOLACE) 8.6-50 mg per tablet Take 2 tablets by mouth once daily.    sotalol (BETAPACE) 80 MG tablet TAKE 1/2 TABLET BY MOUTH DAILY    tiotropium bromide 2.5 mcg/actuation Mist Inhale 5 mcg into the lungs once daily. Controller    traMADol (ULTRAM) 50 mg tablet Take 1 tablet (50 mg total) by mouth every 6 (six) hours as needed. Please fill greater than 7 day supply.Medication is medically necessary.     Facility-Administered Medications Ordered in Other Encounters   Medication    alteplase injection 2 mg     Family History     None        Tobacco Use    Smoking status: Former Smoker     Packs/day: 1.00     Years: 54.00     Pack years: 54.00     Types: Cigarettes     Last attempt to quit: 2014     Years since quittin.8    Smokeless tobacco: Never Used   Substance and Sexual Activity    Alcohol use: No     Frequency: Never    Drug use: No    Sexual activity: Yes     Partners: Male     ROS per ED 19  Objective:     Vital Signs (Most Recent):  Temp: 97.8 °F (36.6 °C) (19)  Pulse: 80 (19)  Resp: 16 (19)  BP: 125/71 (19)  SpO2: 100 % (19) Vital Signs (24h Range):  Temp:  [97.4 °F (36.3 °C)-97.8 °F (36.6 °C)] 97.8 °F (36.6 °C)  Pulse:  [79-81] 80  Resp:  [16-20] 16  SpO2:  [96 %-100 %] 100 %  BP: (118-131)/(66-77) 125/71     Weight: 45.8 kg (101 lb)  Height: 5' 1.5" (156.2 " cm)  Body mass index is 18.77 kg/m².    Ortho/SPM Exam       PHYSICAL EXAM:  Awake/Alert/Oriented x3, No acute distress, Afebrile, Vital signs stable  Normocephalic, atraumatic  Palpable distal pulses  Good inspiratory effort with unlaboured breathing  Cachectic appearance  C collar in place  NTTP along spine    Motor            RIGHT  LEFT  Deltoid(C5)        5/5    5/5  Biceps(C5)         5/5    5/5  Extensor Carpi Radialis Longus(C6)    5/5    5/5   Triceps(C7)       5/5    5/5     Flexor Carpi Radialis(C7)     5/5    5/5  Flexor Digitorum Superficialis(C8)    5/5    5/5  Interossei(T1)       5/5    5/5     Sensation (a=absent, i=impaired, n=normal)       RIGHT  LEFT  C5 dermatome       n     n  C6 dermatome       n     n  C7 dermatome       n     n  C8 dermatome       n     n  T1 dermatome       n     n    Reflexes       RIGHT  LEFT  Triceps        2+     2+  Biceps         2+     2+  Brachioradialis       2+           2+  Hoffmans's       neg    Neg      Motor             RIGHT  LEFT  Iliopsoas (L2,3)       4/5    4/5  Quadriceps (L3,4)      4/5    4/5   Tibialis Anterior (L4.5)         5/5    5/5   Extensor Halucis Longus (L5)    5/5    5/5     Gastrocnemius/Soleus (S1)     5/5    5/5  Flexor Hallucis Longus(S2)     5/5    5/5    Sensation (a=absent, i=impaired, n=normal)       RIGHT   LEFT  L2 dermatome                  n     n  L3 dermatome                  n     n  L4 dermatome                  n     n  L5 dermatome                  n     n  S1 dermatome       n     n  S2 dermatome       n     n    Reflexes        RIGHT  LEFT  Patellar       2+     2+  Achilles       2+     2+  Babinski      neg    neg  Clonus          neg    neg          Significant Labs: All pertinent labs within the past 24 hours have been reviewed.    Significant Imaging: CT: I have reviewed all pertinent results/findings and my personal findings are:  CT C spine showing possible C1 posterior right arch fracture, unclear if this  represents true fracture or anatomic variants

## 2019-11-01 NOTE — ASSESSMENT & PLAN NOTE
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  Now with fracture of her C1 vertebrae and overall worsening performance status, she would not be eligible for further chemotherapy treatment.  Hospice care was discussed with the patient and her  and they are amenable to it moving forward.      Plan:  -speak with SW to discuss hospice options home vs inpatient. Will begin comfort care measures and support.   -c/w home clonazepam 0.5mg TID   -PRN oxycodone 5mg q4h  -JOSEPH/SCDs for DVT ppx  -PRN zofran for nausea

## 2019-11-01 NOTE — HPI
Patient is a 78 yo F with PMHx of extensive stage SCLC s/p 4 cycles of carboplatin etoposide (currently on maintenance atezolizumab) who [place] [facility] [place] with a C1 fracture after falling.  Patient is very hard of hearing.  Patient's  was present for the history and provided much of the detail.  She states that she fell around 2:05am this morning when she got up to use the restroom.  Per the patient's , she lost her balance and hit her head on the door hinge.  He states that she has fallen several times in the last few weeks.  She endorses WL over the last year and a decreased appetite, which has not responded to several appetite stimulant medications.  She denies any pain in her neck at the moment, but she is in a C collar.  She denies any new onset numbness, tingling, headache, weakness, vision changes, chest pain, or shortness of breath.  She recently saw her Oncologist, Dr Atkinson, where she received her last dose of atezolizumab on 10/23/19.  He introduced the conversation of potential hospice in the future for her as she has had limited improvement in her symptoms.      The following oncology hx is per Dr. 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  She was started on carboplatin, etoposide, and atezolizumab.  No focal neurologic deficits and thus WBRT was initially deferred.  Completed 4 cycles of carbo/etopo/atezo 6/7/19-8/21/19.  Was started on WBRT 3Gy/Fx and completed 10Fx for total dose 30Gy 10/1/19-10/16/19.   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  See comment.  Comment:  The left lung biopsy shows a malignant proliferation of cells with increased nuclear cytoplasmic ratio, stippled chromatin pattern with small amount of cytoplasm. Some of cells show molding and crush artifact. The majority of the cells are relatively small in size, however there are a few areas with larger cells within increased amount of cytoplasm. The cells are arranged in sheets and nests with brisk mitotic activity and areas of necrosis. Immunohistochemical stains reveal the tumor cells to stain positively with cytokeratin AE1/AE3, synaptophysin, chromogranin and TTF-1. Immunohistochemical stain for p63 is negative within the tumor cells. Immunostain for cytokeratin 7 shows positive staining in the background lung but is negative within tumor cells. All stains have atisfactory positive negative controls. 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 Correlate clinically.

## 2019-11-01 NOTE — HOSPITAL COURSE
In the ED, CT of the head and neck was revealing for a C1 nondisplaced fracture.  No other acute abnormalities noted.   CXR was unrevealing for infectious etiology.  UA showed was positive for nitrites and leukocytes, started bactrim. SW spoke with her and her family today; due to hx of several falls over the last month and 's concern that he would not be able to manage her needs at home, NH placement for hospice would be the best route for her. Patient accepted by [facility]. Patient completed Bactrim for UTI on 11/4.

## 2019-11-01 NOTE — CLINICAL REVIEW
Met with patient, patient's , and patient's daughter.  Explained her fracture, though not requiring surgery, and her worsening performance status will make her ineligible for further treatments.  Furthermore, I again expressed concern that treatments are not helping her to begin with.  Reviewed hospice with them and they are agreeable to this plan.  Spoke with , and he says he cannot take care of her at home in her current condition where she is fully dependent on him for all ADLs.  Spoke to inpatient med onc fellow who will admit to their team to help plan appropriate disposition.    Stephan Atkinson MD  Hematology Oncology Fellow PGY6  Pager [phone]

## 2019-11-01 NOTE — HPI
Rosita Almodovar is a 79 y.o. female with PMH of SCLC with brain Mets presenting as transfer from [place] with C1 fracture after falling and hitting head against a door jamb yesterday.  Patient reports last chemotherapy several weeks ago. Pt fell in the bathroom last night, she states she was not wearing socks and slipped. Pt hit her head on the cabinet.  Endorses pain with neck movement although she is currently in collar, denying headache or pain and neck at this time, denying back pain, denying numbness/tingling or weakness in extremities. Pt had head trauma when she fell, hit her left posterolateral head. Pt has previous back pain, no change today. Pt denies LOC.  Endorses feeling weak with multiple recent falls. Pt uses a walker at baseline. No blood thinner at baseline.

## 2019-11-01 NOTE — SUBJECTIVE & OBJECTIVE
Oncology Treatment Plan:   OP ATEZOLIZUMAB CARBOPLATIN ETOPOSIDE Q3W    Medications:  Continuous Infusions:  Scheduled Meds:   clonazePAM  0.5 mg Oral TID    cyproheptadine  4 mg Oral QID    fluticasone furoate-vilanterol  1 puff Inhalation Daily    [START ON 2019] levothyroxine  50 mcg Oral Before breakfast    midodrine  5 mg Oral Daily    pantoprazole  40 mg Oral Daily    pravastatin  20 mg Oral Daily     PRN Meds:albuterol, Dextrose 10% Bolus, Dextrose 10% Bolus, glucagon (human recombinant), glucose, glucose, ondansetron, senna-docusate 8.6-50 mg, sodium chloride 0.9%, sodium chloride 0.9%, sodium chloride 0.9%     Review of patient's allergies indicates:   Allergen Reactions    Potassium Nausea Only        Past Medical History:   Diagnosis Date    Acquired hypothyroidism 2019    Brain tumor     COPD (chronic obstructive pulmonary disease) 2019    Gastric ulcer     Hilar mass     Kidney cysts     left    Pleural effusion      Past Surgical History:   Procedure Laterality Date    ANGIOGRAM, CORONARY, WITH LEFT HEART CATHETERIZATION      endoscope      INSERTION OF TUNNELED CENTRAL VENOUS CATHETER (CVC) WITH SUBCUTANEOUS PORT Left 2019    Procedure: [id number] LEFT NECK;  Surgeon: Reggie Franklin Jr., MD;  Location: [facility] OR [address] [place] [address];  Service: General;  Laterality: Left;    kidney cyst excision and drainage       Family History     None        Tobacco Use    Smoking status: Former Smoker     Packs/day: 1.00     Years: 54.00     Pack years: 54.00     Types: Cigarettes     Last attempt to quit:      Years since quittin.8    Smokeless tobacco: Never Used   Substance and Sexual Activity    Alcohol use: No     Frequency: Never    Drug use: No    Sexual activity: Yes     Partners: Male       Review of Systems   Constitutional: Positive for appetite change. Negative for fever.   HENT: Negative for congestion and rhinorrhea.    Eyes: Negative for photophobia  and visual disturbance.   Respiratory: Negative for cough and shortness of breath.    Cardiovascular: Negative for chest pain and leg swelling.   Gastrointestinal: Negative for abdominal distention, abdominal pain, nausea and vomiting.   Musculoskeletal: Positive for back pain and neck pain.   Skin: Negative for pallor and rash.   Neurological: Positive for weakness. Negative for light-headedness.   Hematological: Does not bruise/bleed easily.   Psychiatric/Behavioral: Negative for agitation and confusion.     Objective:     Vital Signs (Most Recent):  Temp: 96.3 °F (35.7 °C) (11/01/19 1602)  Pulse: 85 (11/01/19 1602)  Resp: 18 (11/01/19 1602)  BP: 136/71 (11/01/19 1602)  SpO2: 98 % (11/01/19 1602) Vital Signs (24h Range):  Temp:  [96.3 °F (35.7 °C)-97.8 °F (36.6 °C)] 96.3 °F (35.7 °C)  Pulse:  [78-85] 85  Resp:  [15-20] 18  SpO2:  [96 %-100 %] 98 %  BP: (118-147)/(66-78) 136/71     Weight: 38.2 kg (84 lb 3.5 oz)  Body mass index is 15.65 kg/m².  Body surface area is 1.29 meters squared.    No intake or output data in the 24 hours ending 11/01/19 1700    Physical Exam   Constitutional: She is oriented to person, place, and time.   Patient is an elderly 78 yo F who appears cachetic, frail and in NAD.    HENT:   C-collar in place  Bruising noted over left occiput   Poor of hearing   Eyes: Pupils are equal, round, and reactive to light. EOM are normal.   Neck: No JVD present.   Cardiovascular: Normal rate and intact distal pulses.   Pulmonary/Chest: Effort normal and breath sounds normal. She has no wheezes.   Abdominal: Soft. Bowel sounds are normal. She exhibits no distension. There is no tenderness.   Musculoskeletal: Normal range of motion. She exhibits no edema.   Strength 4/5 in BL LE.  Strength 5/5 in UE.  Sensation is grossly intact.    Neurological: She is alert and oriented to person, place, and time.   Psychiatric: She has a normal mood and affect.     Significant Labs:   CBC:   Recent Labs   Lab  11/01/19 0434   WBC 4.10   HGB 9.0*   HCT 28.2*       and CMP:   Recent Labs   Lab 11/01/19 0434   *   K 3.5   *   CO2 25   *   BUN 15   CREATININE 0.7   CALCIUM 9.1   ANIONGAP 10   EGFRNONAA >60.0       Diagnostic Results:  I have reviewed all pertinent imaging results/findings within the past 24 hours.

## 2019-11-01 NOTE — H&P
[facility]  Hematology/Oncology  H&P    Patient Name: Rosita Almodovar  MRN: [id number]  Admission Date: 11/1/2019  Code Status: DNR   Attending Provider: Reba Alex MD  Primary Care Physician: Florecita Jameson NP  Principal Problem:Small cell lung cancer    Subjective:     HPI:   Patient is a 78 yo F with PMHx of extensive stage SCLC s/p 4 cycles of carboplatin etoposide (currently on maintenance atezolizumab) who presented to [place] as a transfer from [place] with a C1 fracture after falling.  Patient is very hard of hearing.  Patient's  was present for the history and provided much of the detail.  She states that she fell around 2:05am this morning when she got up to use the restroom.  Per the patient's , she lost her balance and hit her head on the door hinge.  He states that she has fallen several times in the last few weeks.  She endorses WL over the last year and a decreased appetite, which has not responded to several appetite stimulant medications.  She denies any pain in her neck at the moment, but she is in a C collar.  She denies any new onset numbness, tingling, headache, weakness, vision changes, chest pain, or shortness of breath.  She recently saw her Oncologist, Dr Atkinson, where she received her last dose of atezolizumab on 10/23/19.  He introduced the conversation of potential hospice in the future for her as she has had limited improvement in her symptoms.      The following oncology hx is per Dr. 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  She was started on carboplatin, etoposide, and atezolizumab.  No focal neurologic deficits and thus WBRT was initially deferred.  Completed 4 cycles of carbo/etopo/atezo 6/7/19-8/21/19.  Was started on WBRT 3Gy/Fx and completed 10Fx for total dose 30Gy 10/1/19-10/16/19.   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  See comment.  Comment:  The left lung biopsy shows a malignant proliferation of cells with increased nuclear cytoplasmic ratio, stippled chromatin pattern with small amount of cytoplasm. Some of cells show molding and crush artifact. The majority of the cells are relatively small in size, however there are a few areas with larger cells within increased amount of cytoplasm. The cells are arranged in sheets and nests with brisk mitotic activity and areas of necrosis. Immunohistochemical stains reveal the tumor cells to stain positively with cytokeratin AE1/AE3, synaptophysin, chromogranin and TTF-1. Immunohistochemical stain for p63 is negative within the tumor cells. Immunostain for cytokeratin 7 shows positive staining in the background lung but is negative within tumor cells. All stains have atisfactory positive negative controls. 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 Correlate clinically.        Oncology Treatment Plan:   OP ATEZOLIZUMAB CARBOPLATIN ETOPOSIDE Q3W    Medications:  Continuous Infusions:  Scheduled Meds:   clonazePAM  0.5 mg Oral TID    cyproheptadine  4 mg Oral QID    fluticasone furoate-vilanterol  1 puff Inhalation Daily    [START ON 11/2/2019] levothyroxine  50 mcg Oral Before breakfast    midodrine  5 mg Oral  Daily    pantoprazole  40 mg Oral Daily    pravastatin  20 mg Oral Daily     PRN Meds:albuterol, Dextrose 10% Bolus, Dextrose 10% Bolus, glucagon (human recombinant), glucose, glucose, ondansetron, senna-docusate 8.6-50 mg, sodium chloride 0.9%, sodium chloride 0.9%, sodium chloride 0.9%     Review of patient's allergies indicates:   Allergen Reactions    Potassium Nausea Only        Past Medical History:   Diagnosis Date    Acquired hypothyroidism 2019    Brain tumor     COPD (chronic obstructive pulmonary disease) 2019    Gastric ulcer     Hilar mass     Kidney cysts     left    Pleural effusion      Past Surgical History:   Procedure Laterality Date    ANGIOGRAM, CORONARY, WITH LEFT HEART CATHETERIZATION      endoscope      INSERTION OF TUNNELED CENTRAL VENOUS CATHETER (CVC) WITH SUBCUTANEOUS PORT Left 2019    Procedure: [id number] LEFT NECK;  Surgeon: Reggie Franklin Jr., MD;  Location: [facility] OR [address] [place] [address];  Service: General;  Laterality: Left;    kidney cyst excision and drainage       Family History     None        Tobacco Use    Smoking status: Former Smoker     Packs/day: 1.00     Years: 54.00     Pack years: 54.00     Types: Cigarettes     Last attempt to quit:      Years since quittin.8    Smokeless tobacco: Never Used   Substance and Sexual Activity    Alcohol use: No     Frequency: Never    Drug use: No    Sexual activity: Yes     Partners: Male       Review of Systems   Constitutional: Positive for appetite change. Negative for fever.   HENT: Negative for congestion and rhinorrhea.    Eyes: Negative for photophobia and visual disturbance.   Respiratory: Negative for cough and shortness of breath.    Cardiovascular: Negative for chest pain and leg swelling.   Gastrointestinal: Negative for abdominal distention, abdominal pain, nausea and vomiting.   Musculoskeletal: Positive for back pain and neck pain.   Skin: Negative for pallor and rash.    Neurological: Positive for weakness. Negative for light-headedness.   Hematological: Does not bruise/bleed easily.   Psychiatric/Behavioral: Negative for agitation and confusion.     Objective:     Vital Signs (Most Recent):  Temp: 96.3 °F (35.7 °C) (11/01/19 1602)  Pulse: 85 (11/01/19 1602)  Resp: 18 (11/01/19 1602)  BP: 136/71 (11/01/19 1602)  SpO2: 98 % (11/01/19 1602) Vital Signs (24h Range):  Temp:  [96.3 °F (35.7 °C)-97.8 °F (36.6 °C)] 96.3 °F (35.7 °C)  Pulse:  [78-85] 85  Resp:  [15-20] 18  SpO2:  [96 %-100 %] 98 %  BP: (118-147)/(66-78) 136/71     Weight: 38.2 kg (84 lb 3.5 oz)  Body mass index is 15.65 kg/m².  Body surface area is 1.29 meters squared.    No intake or output data in the 24 hours ending 11/01/19 1700    Physical Exam   Constitutional: She is oriented to person, place, and time.   Patient is an elderly 78 yo F who appears cachetic, frail and in NAD.    HENT:   C-collar in place  Bruising noted over left occiput   Poor of hearing   Eyes: Pupils are equal, round, and reactive to light. EOM are normal.   Neck: No JVD present.   Cardiovascular: Normal rate and intact distal pulses.   Pulmonary/Chest: Effort normal and breath sounds normal. She has no wheezes.   Abdominal: Soft. Bowel sounds are normal. She exhibits no distension. There is no tenderness.   Musculoskeletal: Normal range of motion. She exhibits no edema.   Strength 4/5 in BL LE.  Strength 5/5 in UE.  Sensation is grossly intact.    Neurological: She is alert and oriented to person, place, and time.   Psychiatric: She has a normal mood and affect.     Significant Labs:   CBC:   Recent Labs   Lab 11/01/19 0434   WBC 4.10   HGB 9.0*   HCT 28.2*       and CMP:   Recent Labs   Lab 11/01/19 0434   *   K 3.5   *   CO2 25   *   BUN 15   CREATININE 0.7   CALCIUM 9.1   ANIONGAP 10   EGFRNONAA >60.0       Diagnostic Results:  I have reviewed all pertinent imaging results/findings within the past 24  hours.    Assessment/Plan:     * Small cell lung cancer  Patient is a 80 yo F with PMHx of extensive stage SCLC s/p 4 cycles of carboplatin etoposide (currently on maintenance atezolizumab.) Patient has shown increase weakness, fatigue and falls at home over the last few months.  Now with fracture of her C1 vertebrae and overall worsening performance status, she would not be eligible for further chemotherapy treatment.  Hospice care was discussed with the patient and her  and they are amenable to it moving forward.      Plan:  -speak with SW to discuss hospice options home vs inpatient. Will begin comfort care measures and support.   -c/w home clonazepam 0.5mg TID   -PRN oxycodone 5mg q4h  -JOSEPH/SCDs for DVT ppx  -PRN zofran for nausea     Closed nondisplaced fracture of posterior arch of first cervical vertebra  Patient fell from standing height and struck her head/neck door hinge.    CT neck (11/1) shows possible C1 right posterior arch fracture    -Consulted orthopedics, appreciate recommendations   -Ortho does not recommend surgical intervention at this time   -Continue Aspen collar for 1 week         UTI (urinary tract infection)  UA (+) for nitrites, leukocytes, and bacteria.   -started patient on PO bactrim QD    Acquired hypothyroidism  -c/w home levothyroxine         Champ Trammell MD  Hematology/Oncology  [facility]

## 2019-11-01 NOTE — ED PROVIDER NOTES
Encounter Date: 2019       History     Chief Complaint   Patient presents with    [facility]  tx- C1 fracture     C1 fracture, here for neuro consult     79-year-old female with past medical history of SCLC with brain Mets presenting as transfer from [facility] with C1 fracture after falling and hitting head against a door jamb yesterday.  Patient reports last chemotherapy several weeks ago.  Endorses pain with neck movement although she is currently in collar, denying headache or pain and neck at this time, denying back pain, denying numbness/tingling or weakness in extremities. Endorses feeling weak with multiple recent falls.        Review of patient's allergies indicates:   Allergen Reactions    Potassium Nausea Only     Past Medical History:   Diagnosis Date    Acquired hypothyroidism 2019    Brain tumor     COPD (chronic obstructive pulmonary disease) 2019    Gastric ulcer     Hilar mass     Kidney cysts     left    Pleural effusion      Past Surgical History:   Procedure Laterality Date    ANGIOGRAM, CORONARY, WITH LEFT HEART CATHETERIZATION      endoscope      INSERTION OF TUNNELED CENTRAL VENOUS CATHETER (CVC) WITH SUBCUTANEOUS PORT Left 2019    Procedure: [id number] LEFT NECK;  Surgeon: Reggie Franklin Jr., MD;  Location: [facility] OR [address] [place] [address];  Service: General;  Laterality: Left;    kidney cyst excision and drainage       History reviewed. No pertinent family history.  Social History     Tobacco Use    Smoking status: Former Smoker     Packs/day: 1.00     Years: 54.00     Pack years: 54.00     Types: Cigarettes     Last attempt to quit: 2014     Years since quittin.8    Smokeless tobacco: Never Used   Substance Use Topics    Alcohol use: No     Frequency: Never    Drug use: No     Review of Systems   Constitutional: Positive for fatigue. Negative for chills and fever.   HENT: Negative for congestion, nosebleeds and tinnitus.    Eyes: Negative for pain and  visual disturbance.        Neg vision changes   Respiratory: Negative for cough and shortness of breath.    Cardiovascular: Negative for chest pain and leg swelling.   Gastrointestinal: Negative for abdominal pain, blood in stool, nausea and vomiting.        Neg changes in stool   Genitourinary: Negative for decreased urine volume, dysuria and frequency.        Neg changes in urination   Musculoskeletal: Positive for gait problem and neck pain. Negative for arthralgias, back pain, joint swelling and neck stiffness.   Skin: Negative for pallor, rash and wound.   Allergic/Immunologic: Positive for immunocompromised state.   Neurological: Positive for weakness. Negative for dizziness, facial asymmetry, speech difficulty, numbness and headaches.   Hematological: Does not bruise/bleed easily.   Psychiatric/Behavioral: Negative for confusion.       Physical Exam     Initial Vitals [11/01/19 0715]   BP Pulse Resp Temp SpO2   125/71 80 16 97.8 °F (36.6 °C) 100 %      MAP       --         Physical Exam    Nursing note and vitals reviewed.  Constitutional: She is not diaphoretic. No distress.   Thin, globally weakened   HENT:   Head: Normocephalic and atraumatic.   Nose: Nose normal.   Eyes: EOM are normal. Pupils are equal, round, and reactive to light.   Neck: Neck supple. No tracheal deviation present.   Cervical collar in place   Cardiovascular: Normal rate and regular rhythm.   Pulmonary/Chest: Breath sounds normal. No stridor. No respiratory distress. She has no wheezes. She has no rhonchi. She has no rales. She exhibits no tenderness.   Abdominal: Soft. Bowel sounds are normal. She exhibits no distension. There is no tenderness. There is no rebound.   Musculoskeletal: Normal range of motion. She exhibits tenderness (No other midline CTL tenderness to palpation (did not palpate upper cervical 2/2 known fx)).   Neurological: She is alert and oriented to person, place, and time. She has normal strength. No cranial nerve  deficit or sensory deficit.   Globally weakened: bilateral lower extremity legs 4/5, feet 5/5, upper extremity bilaterally 5/5.  Sensation intact throughout.   Skin: Skin is warm and dry. Capillary refill takes less than 2 seconds. No rash noted. No pallor.   Psychiatric: She has a normal mood and affect. Her behavior is normal. Thought content normal.         ED Course   Procedures  Labs Reviewed   URINALYSIS, REFLEX TO URINE CULTURE - Abnormal; Notable for the following components:       Result Value    Appearance, UA Cloudy (*)     Ketones, UA Trace (*)     Nitrite, UA Positive (*)     Leukocytes, UA Trace (*)     All other components within normal limits    Narrative:     Preferred Collection Type->Urine, Clean Catch   URINALYSIS MICROSCOPIC - Abnormal; Notable for the following components:    WBC, UA 14 (*)     Bacteria Moderate (*)     Hyaline Casts, UA 3 (*)     All other components within normal limits    Narrative:     Preferred Collection Type->Urine, Clean Catch   CULTURE, URINE          Imaging Results          X-Ray Chest AP Portable (Final result)  Result time 11/01/19 08:27:03    Final result by Dhaval Neal III, MD (11/01/19 08:27:03)                 Impression:      No change from the prior study.  This could be mild pulmonary edema versus pneumonia.      Electronically signed by: Dhaval Neal MD  Date:    11/01/2019  Time:    08:27             Narrative:    EXAMINATION:  XR CHEST AP PORTABLE    CLINICAL HISTORY:  Other fatigue    FINDINGS:  One view: Central line tip mid SVC.  Heart size is upper normal there is aortic plaque and DJD.  There is mild bilateral edema left pleural effusion.                               X-Ray Cervical Spine Complete 5 view (Final result)  Result time 11/01/19 08:26:27    Final result by Dhaval Neal III, MD (11/01/19 08:26:27)                 Impression:      Degenerative changes as above.      Electronically signed by: Dhaval Neal  MD  Date:    11/01/2019  Time:    08:26             Narrative:    EXAMINATION:  XR CERVICAL SPINE COMPLETE 5 VIEW    CLINICAL HISTORY:  fracture;    FINDINGS:  Cervical spine five views.    Central line tip mid SVC.  There is aortic plaque.  There is grade 1 anterolisthesis of C4 on C5.  There is moderate DJD throughout the C-spine.  Odontoid prevertebral soft tissues and posterior elements are intact.  No fracture dislocation bone destruction seen.  There is bilateral neural foraminal narrowing between C4 and T1.  No fracture dislocation bone destruction seen.                                 Medical Decision Making:   History:   I obtained history from: someone other than patient.       <> Summary of History: Family members are concerned that patient may be making poor decisions and is forgetful given her metastases  Old Medical Records: I decided to obtain old medical records.  Old Records Summarized: records from clinic visits and records from previous admission(s).       <> Summary of Records: CT showing: C1 incomplete fracture in the ring, there is also what appears to be metastatic disease throughout the cervical spine.    Initial Assessment:   Frail but no focal neuro deficits  Differential Diagnosis:   Fracture, cord impingement, sprain  Clinical Tests:   Lab Tests: Ordered and Reviewed  Radiological Study: Ordered and Reviewed  Medical Tests: Ordered and Reviewed  ED Management:  Will discuss with Spine consulting service (orthopedic), may need MRI.  Patient denies metal implants or other absolute contraindication to MRI, however she states she is very claustrophobic and usually requires sedation during imaging.  Will also obtain urinalysis and chest x-ray to investigate possible underlying infectious etiology causing global weakness.    8:45 AM  Ortho evaluated patient, will place patient in [facility], may obtain CTA after discussing with attending.    9:45 AM  Ortho recs: Aspen collar x 2 wk until f/u.   Patient appears globally weakened in frail, may require rehab placement given new fracture and reduced mobility with collar.     Although patient is currently being treated with chemotherapy, and fracture may be pathologic versus trauma, patient admitted to Hospital Medicine after discussion with Oncology.  Urinalysis pending.  Other:   I have discussed this case with another health care provider.       <> Summary of the Discussion: See ortho recommendations above.                      Clinical Impression:       ICD-10-CM ICD-9-CM   1. Closed nondisplaced fracture of posterior arch of first cervical vertebra, initial encounter S12.031A 805.01   2. Fatigue R53.83 780.79   3. Small cell lung cancer C34.90 162.9                                Rosaura Lynn MD  11/02/19 124

## 2019-11-01 NOTE — ASSESSMENT & PLAN NOTE
Patient fell from standing height and struck her head/neck door hinge.    CT neck (11/1) shows possible C1 right posterior arch fracture    -Consulted orthopedics, appreciate recommendations   -Ortho does not recommend surgical intervention at this time   -Continue Aspen collar for 1 week

## 2019-11-01 NOTE — CONSULTS
[facility]  Orthopedics  Consult Note    Patient Name: Rosita Almodovar  MRN: [id number]  Admission Date: 11/1/2019  Hospital Length of Stay: 0 days  Attending Provider: Rosaura Lynn MD  Primary Care Provider: Florecita Jameson NP         Inpatient consult to Orthopedic Surgery  Consult performed by: Manish Grimaldo MD  Consult ordered by: Rosaura Lynn MD        Subjective:     Principal Problem:Closed nondisplaced fracture of posterior arch of first cervical vertebra    Chief Complaint:   Chief Complaint   Patient presents with    [facility]  tx- C1 fracture     C1 fracture, here for neuro consult        HPI: Rosita Almodovar is a 79 y.o. female with PMH  of SCLC with brain Mets presenting as transfer from [facility] with C1 fracture after falling and hitting head against a door jamb yesterday.  Patient reports last chemotherapy several weeks ago. Pt fell in the bathroom last night, she states she was not wearing socks and slipped. Pt hit her head on the cabinet.  Endorses pain with neck movement although she is currently in collar, denying headache or pain and neck at this time, denying back pain, denying numbness/tingling or weakness in extremities. Pt had head trauma when she fell, hit her left posterolateral head. Pt has previous back pain, no change today. Pt denies LOC.  Endorses feeling weak with multiple recent falls. Pt uses a walker at baseline. No blood thinner at baseline.        Past Medical History:   Diagnosis Date    Acquired hypothyroidism 5/8/2019    Brain tumor     COPD (chronic obstructive pulmonary disease) 5/8/2019    Gastric ulcer     Hilar mass     Kidney cysts     left    Pleural effusion        Past Surgical History:   Procedure Laterality Date    ANGIOGRAM, CORONARY, WITH LEFT HEART CATHETERIZATION      endoscope      INSERTION OF TUNNELED CENTRAL VENOUS CATHETER (CVC) WITH SUBCUTANEOUS PORT Left 6/4/2019    Procedure:  [id number] LEFT NECK;  Surgeon: Reggie Franklin Jr., MD;  Location: [facility] OR [address] [place] [address];  Service: General;  Laterality: Left;    kidney cyst excision and drainage         Review of patient's allergies indicates:   Allergen Reactions    Potassium Nausea Only       No current facility-administered medications for this encounter.      Current Outpatient Medications   Medication Sig    albuterol (VENTOLIN HFA) 90 mcg/actuation inhaler Ventolin HFA 90 mcg/actuation aerosol inhaler    cyproheptadine (PERIACTIN) 4 mg tablet Take 1 tablet (4 mg total) by mouth 4 (four) times daily.    fluticasone-umeclidin-vilanter (TRELEGY ELLIPTA) 100-62.5-25 mcg DsDv Inhale 1 puff into the lungs once daily.    furosemide (LASIX) 20 MG tablet TAKE ONE TABLET BY MOUTH EVERY DAY    levothyroxine (SYNTHROID) 50 MCG tablet TAKE ONE TABLET BY MOUTH EVERY IN THE MORNING BEFORE BREAKFAST    atenolol (TENORMIN) 50 MG tablet atenolol 50 mg tablet    clonazePAM (KLONOPIN) 0.5 MG tablet TAKE ONE TABLET BY MOUTH THREE TIMES DAILY AS NEEDED FOR ANXIETY OR for panic attacks    clopidogrel (PLAVIX) 75 mg tablet Take 75 mg by mouth once daily.    DECARA 50,000 unit capsule Take 50,000 Units by mouth every 7 days.    lidocaine-prilocaine (EMLA) cream Apply to port site 1 hour prior to chemotherapy and cover with saran wrap    lisinopril (PRINIVIL,ZESTRIL) 5 MG tablet Take 5 mg by mouth once daily.     LORazepam (ATIVAN) 1 MG tablet Take 1 tablet (1 mg total) by mouth as needed for Anxiety (Take 30 minutes prior to daily radiation treatment). (Patient not taking: Reported on 10/17/2019)    midodrine (PROAMATINE) 5 MG Tab     ondansetron (ZOFRAN-ODT) 8 MG TbDL Dissolve 1 tablet (8 mg total) under tongue every 8 (eight) hours as needed (nausea).    pantoprazole (PROTONIX) 40 MG tablet TAKE ONE TABLET BY MOUTH EVERY DAY FOR stomach    pravastatin (PRAVACHOL) 20 MG tablet Take 20 mg by mouth once daily.     predniSONE (DELTASONE)  "20 MG tablet Take one daily for 3 days and may repeat for shortness of breath.    senna-docusate 8.6-50 mg (PERICOLACE) 8.6-50 mg per tablet Take 2 tablets by mouth once daily.    sotalol (BETAPACE) 80 MG tablet TAKE 1/2 TABLET BY MOUTH DAILY    tiotropium bromide 2.5 mcg/actuation Mist Inhale 5 mcg into the lungs once daily. Controller    traMADol (ULTRAM) 50 mg tablet Take 1 tablet (50 mg total) by mouth every 6 (six) hours as needed. Please fill greater than 7 day supply.Medication is medically necessary.     Facility-Administered Medications Ordered in Other Encounters   Medication    alteplase injection 2 mg     Family History     None        Tobacco Use    Smoking status: Former Smoker     Packs/day: 1.00     Years: 54.00     Pack years: 54.00     Types: Cigarettes     Last attempt to quit:      Years since quittin.8    Smokeless tobacco: Never Used   Substance and Sexual Activity    Alcohol use: No     Frequency: Never    Drug use: No    Sexual activity: Yes     Partners: Male     ROS per ED 19  Objective:     Vital Signs (Most Recent):  Temp: 97.8 °F (36.6 °C) (19)  Pulse: 80 (19)  Resp: 16 (19)  BP: 125/71 (19)  SpO2: 100 % (19) Vital Signs (24h Range):  Temp:  [97.4 °F (36.3 °C)-97.8 °F (36.6 °C)] 97.8 °F (36.6 °C)  Pulse:  [79-81] 80  Resp:  [16-20] 16  SpO2:  [96 %-100 %] 100 %  BP: (118-131)/(66-77) 125/71     Weight: 45.8 kg (101 lb)  Height: 5' 1.5" (156.2 cm)  Body mass index is 18.77 kg/m².    Ortho/SPM Exam       PHYSICAL EXAM:  Awake/Alert/Oriented x3, No acute distress, Afebrile, Vital signs stable  Normocephalic, atraumatic  Palpable distal pulses  Good inspiratory effort with unlaboured breathing  Cachectic appearance  C collar in place  NTTP along spine    Motor            RIGHT  LEFT  Deltoid(C5)        5/5    5/5  Biceps(C5)         5/5    5/5  Extensor Carpi Radialis Longus(C6)    5/5    5/5   Triceps(C7)       " 5/5    5/5     Flexor Carpi Radialis(C7)     5/5    5/5  Flexor Digitorum Superficialis(C8)    5/5    5/5  Interossei(T1)       5/5    5/5     Sensation (a=absent, i=impaired, n=normal)       RIGHT  LEFT  C5 dermatome       n     n  C6 dermatome       n     n  C7 dermatome       n     n  C8 dermatome       n     n  T1 dermatome       n     n    Reflexes       RIGHT  LEFT  Triceps        2+     2+  Biceps         2+     2+  Brachioradialis       2+           2+  Hoffmans's       neg    Neg      Motor             RIGHT  LEFT  Iliopsoas (L2,3)       4/5    4/5  Quadriceps (L3,4)      4/5    4/5   Tibialis Anterior (L4.5)         5/5    5/5   Extensor Halucis Longus (L5)    5/5    5/5     Gastrocnemius/Soleus (S1)     5/5    5/5  Flexor Hallucis Longus(S2)     5/5    5/5    Sensation (a=absent, i=impaired, n=normal)       RIGHT   LEFT  L2 dermatome                  n     n  L3 dermatome                  n     n  L4 dermatome                  n     n  L5 dermatome                  n     n  S1 dermatome       n     n  S2 dermatome       n     n    Reflexes        RIGHT  LEFT  Patellar       2+     2+  Achilles       2+     2+  Babinski      neg    neg  Clonus          neg    neg          Significant Labs: All pertinent labs within the past 24 hours have been reviewed.    Significant Imaging: CT: I have reviewed all pertinent results/findings and my personal findings are:  CT C spine showing possible C1 posterior right arch fracture, unclear if this represents true fracture or anatomic variants     Assessment/Plan:     * Closed nondisplaced fracture of posterior arch of first cervical vertebra  Rosita Almodovar is a 79 y.o. female presents s/p mechanical fall from standing with head trauma and neck pain. CT showing possible C1 right posterior arch fracture, unclear if this is a true fracture or an anatomic variant.    - Will treat conservatively, no surgical intervention at this time  - Aspen collar for 1 week  - Will  f/u in clinic in 2 weeks  - No restrictions  - Pt discussed with staff          Manish Grimaldo MD  Orthopedics  [facility]

## 2019-11-02 PROCEDURE — 25000003 PHARM REV CODE 250: Performed by: STUDENT IN AN ORGANIZED HEALTH CARE EDUCATION/TRAINING PROGRAM

## 2019-11-02 PROCEDURE — 99232 SBSQ HOSP IP/OBS MODERATE 35: CPT | Mod: GC,,, | Performed by: INTERNAL MEDICINE

## 2019-11-02 PROCEDURE — 25000242 PHARM REV CODE 250 ALT 637 W/ HCPCS: Performed by: STUDENT IN AN ORGANIZED HEALTH CARE EDUCATION/TRAINING PROGRAM

## 2019-11-02 PROCEDURE — 11000001 HC ACUTE MED/SURG PRIVATE ROOM

## 2019-11-02 PROCEDURE — 99232 PR SUBSEQUENT HOSPITAL CARE,LEVL II: ICD-10-PCS | Mod: GC,,, | Performed by: INTERNAL MEDICINE

## 2019-11-02 RX ORDER — SULFAMETHOXAZOLE AND TRIMETHOPRIM 800; 160 MG/1; MG/1
1 TABLET ORAL 2 TIMES DAILY
Status: DISCONTINUED | OUTPATIENT
Start: 2019-11-02 | End: 2019-11-04

## 2019-11-02 RX ADMIN — CYPROHEPTADINE HYDROCHLORIDE 4 MG: 4 TABLET ORAL at 05:11

## 2019-11-02 RX ADMIN — CLONAZEPAM 0.5 MG: 0.5 TABLET ORAL at 08:11

## 2019-11-02 RX ADMIN — SULFAMETHOXAZOLE AND TRIMETHOPRIM 1 TABLET: 400; 80 TABLET ORAL at 08:11

## 2019-11-02 RX ADMIN — CYPROHEPTADINE HYDROCHLORIDE 4 MG: 4 TABLET ORAL at 08:11

## 2019-11-02 RX ADMIN — PANTOPRAZOLE SODIUM 40 MG: 40 TABLET, DELAYED RELEASE ORAL at 08:11

## 2019-11-02 RX ADMIN — CLONAZEPAM 0.5 MG: 0.5 TABLET ORAL at 05:11

## 2019-11-02 RX ADMIN — FLUTICASONE FUROATE AND VILANTEROL TRIFENATATE 1 PUFF: 100; 25 POWDER RESPIRATORY (INHALATION) at 08:11

## 2019-11-02 RX ADMIN — OXYCODONE HYDROCHLORIDE 5 MG: 5 TABLET ORAL at 08:11

## 2019-11-02 RX ADMIN — LEVOTHYROXINE SODIUM 50 MCG: 50 TABLET ORAL at 06:11

## 2019-11-02 RX ADMIN — SULFAMETHOXAZOLE AND TRIMETHOPRIM 1 TABLET: 800; 160 TABLET ORAL at 08:11

## 2019-11-02 RX ADMIN — OXYCODONE HYDROCHLORIDE 5 MG: 5 TABLET ORAL at 01:11

## 2019-11-02 RX ADMIN — MIDODRINE HYDROCHLORIDE 5 MG: 5 TABLET ORAL at 08:11

## 2019-11-02 RX ADMIN — CYPROHEPTADINE HYDROCHLORIDE 4 MG: 4 TABLET ORAL at 01:11

## 2019-11-02 NOTE — PROGRESS NOTES
[facility]  Hematology/Oncology  Progress Note    Patient Name: Rosita Almodovar  Admission Date: 11/1/2019  Hospital Length of Stay: 1 days  Code Status: DNR     Subjective:     HPI:  Patient is a 78 yo F with PMHx of extensive stage SCLC s/p 4 cycles of carboplatin etoposide (currently on maintenance atezolizumab) who [place] [facility] [place] with a C1 fracture after falling.  Patient is very hard of hearing.  Patient's  was present for the history and provided much of the detail.  She states that she fell around 2:05am this morning when she got up to use the restroom.  Per the patient's , she lost her balance and hit her head on the door hinge.  He states that she has fallen several times in the last few weeks.  She endorses WL over the last year and a decreased appetite, which has not responded to several appetite stimulant medications.  She denies any pain in her neck at the moment, but she is in a C collar.  She denies any new onset numbness, tingling, headache, weakness, vision changes, chest pain, or shortness of breath.  She recently saw her Oncologist, Dr Atkinson, where she received her last dose of atezolizumab on 10/23/19.  He introduced the conversation of potential hospice in the future for her as she has had limited improvement in her symptoms.      The following oncology hx is per Dr. 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  She was started on carboplatin, etoposide, and atezolizumab.  No focal neurologic deficits and thus WBRT was initially deferred.  Completed 4 cycles of carbo/etopo/atezo 6/7/19-8/21/19.  Was started on WBRT 3Gy/Fx and completed 10Fx for total dose 30Gy 10/1/19-10/16/19.   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  See comment.  Comment:  The left lung biopsy shows a malignant proliferation of cells with increased nuclear cytoplasmic ratio, stippled chromatin pattern with small amount of cytoplasm. Some of cells show molding and crush artifact. The majority of the cells are relatively small in size, however there are a few areas with larger cells within increased amount of cytoplasm. The cells are arranged in sheets and nests with brisk mitotic activity and areas of necrosis. Immunohistochemical stains reveal the tumor cells to stain positively with cytokeratin AE1/AE3, synaptophysin, chromogranin and TTF-1. Immunohistochemical stain for p63 is negative within the tumor cells. Immunostain for cytokeratin 7 shows positive staining in the background lung but is negative within tumor cells. All stains have atisfactory positive negative controls. 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 Correlate clinically.       Interval History: Patient seen and examined.  Patient had mild pain in her neck last night that was resolved with one dose of PO oxycodone.  Patient slept well throughout the night.  Patient denies any headaches, vision changes, chest pain, shortness of breath.      Oncology Treatment Plan:   OP ATEZOLIZUMAB CARBOPLATIN ETOPOSIDE Q3W    Medications:  Continuous Infusions:  Scheduled Meds:   clonazePAM  0.5 mg Oral TID    cyproheptadine  4  mg Oral QID    fluticasone furoate-vilanterol  1 puff Inhalation Daily    levothyroxine  50 mcg Oral Before breakfast    midodrine  5 mg Oral Daily    pantoprazole  40 mg Oral Daily    sulfamethoxazole-trimethoprim 800-160mg  1 tablet Oral BID     PRN Meds:albuterol, Dextrose 10% Bolus, Dextrose 10% Bolus, glucagon (human recombinant), glucose, glucose, ondansetron, oxyCODONE, senna-docusate 8.6-50 mg, sodium chloride 0.9%, sodium chloride 0.9%, sodium chloride 0.9%     Review of Systems   Constitutional: Negative for fatigue and fever.   Eyes: Negative for visual disturbance.   Respiratory: Negative for cough and shortness of breath.    Cardiovascular: Negative for chest pain and leg swelling.   Gastrointestinal: Negative for abdominal distention, abdominal pain, constipation, diarrhea and nausea.   Genitourinary: Negative for dysuria.   Musculoskeletal: Negative for arthralgias and back pain.     Objective:     Vital Signs (Most Recent):  Temp: 96.4 °F (35.8 °C) (11/02/19 0840)  Pulse: 101 (11/02/19 0840)  Resp: 20 (11/02/19 0840)  BP: 130/77 (11/02/19 0840)  SpO2: 98 % (11/02/19 0840) Vital Signs (24h Range):  Temp:  [96.1 °F (35.6 °C)-97.8 °F (36.6 °C)] 96.4 °F (35.8 °C)  Pulse:  [] 101  Resp:  [15-20] 20  SpO2:  [96 %-100 %] 98 %  BP: (125-147)/(66-77) 130/77     Weight: 38.2 kg (84 lb 3.5 oz)  Body mass index is 15.65 kg/m².  Body surface area is 1.29 meters squared.    No intake or output data in the 24 hours ending 11/02/19 0930    Physical Exam   Constitutional: She is oriented to person, place, and time.   Patient is an elderly 80 yo F who appears cachetic, frail and in NAD.    HENT:   C-collar in place  Bruising noted over left occiput   Poor of hearing   Eyes: Pupils are equal, round, and reactive to light. EOM are normal.   Neck: No JVD present.   Cardiovascular: Normal rate and intact distal pulses.   Pulmonary/Chest: Effort normal and breath sounds normal. She has no wheezes.   Abdominal:  Soft. Bowel sounds are normal. She exhibits no distension. There is no tenderness.   Musculoskeletal: Normal range of motion. She exhibits no edema.   Strength 4/5 in BL LE.  Strength 5/5 in UE.  Sensation is grossly intact.    Neurological: She is alert and oriented to person, place, and time.   Psychiatric: She has a normal mood and affect.       Significant Labs:   None    Diagnostic Results:  I have reviewed all pertinent imaging results/findings within the past 24 hours.    Assessment/Plan:     * Small cell lung cancer  Patient is a 80 yo F with PMHx of extensive stage SCLC s/p 4 cycles of carboplatin etoposide (currently on maintenance atezolizumab.) Patient has shown increase weakness, fatigue and falls at home over the last few months.  Now with fracture of her C1 vertebrae and overall worsening performance status, she would not be eligible for further chemotherapy treatment.  Hospice care was discussed with the patient and her  and they are amenable to it moving forward.      Plan:  -speak with SW to discuss inpatient hospice options. Will begin comfort care measures and support.   -c/w home clonazepam 0.5mg TID   -PRN oxycodone 5mg q4h  -JOSEPH/SCDs for DVT ppx  -PRN zofran for nausea     Closed nondisplaced fracture of posterior arch of first cervical vertebra  Patient fell from standing height and struck her head/neck door hinge.    CT neck (11/1) shows possible C1 right posterior arch fracture    -Consulted orthopedics, appreciate recommendations   -Ortho does not recommend surgical intervention at this time   -Continue Aspen collar for 1 week         UTI (urinary tract infection)  UA (+) for nitrites, leukocytes, and bacteria.   -continue PO bactrim BID for 5 days    Acquired hypothyroidism  -c/w home levothyroxine              Champ Trammell MD  Hematology/Oncology  [facility]

## 2019-11-02 NOTE — SUBJECTIVE & OBJECTIVE
Interval History: Patient seen and examined.  Patient had mild pain in her neck last night that was resolved with one dose of PO oxycodone.  Patient slept well throughout the night.  Patient denies any headaches, vision changes, chest pain, shortness of breath.      Oncology Treatment Plan:   OP ATEZOLIZUMAB CARBOPLATIN ETOPOSIDE Q3W    Medications:  Continuous Infusions:  Scheduled Meds:   clonazePAM  0.5 mg Oral TID    cyproheptadine  4 mg Oral QID    fluticasone furoate-vilanterol  1 puff Inhalation Daily    levothyroxine  50 mcg Oral Before breakfast    midodrine  5 mg Oral Daily    pantoprazole  40 mg Oral Daily    sulfamethoxazole-trimethoprim 800-160mg  1 tablet Oral BID     PRN Meds:albuterol, Dextrose 10% Bolus, Dextrose 10% Bolus, glucagon (human recombinant), glucose, glucose, ondansetron, oxyCODONE, senna-docusate 8.6-50 mg, sodium chloride 0.9%, sodium chloride 0.9%, sodium chloride 0.9%     Review of Systems   Constitutional: Negative for fatigue and fever.   Eyes: Negative for visual disturbance.   Respiratory: Negative for cough and shortness of breath.    Cardiovascular: Negative for chest pain and leg swelling.   Gastrointestinal: Negative for abdominal distention, abdominal pain, constipation, diarrhea and nausea.   Genitourinary: Negative for dysuria.   Musculoskeletal: Negative for arthralgias and back pain.     Objective:     Vital Signs (Most Recent):  Temp: 96.4 °F (35.8 °C) (11/02/19 0840)  Pulse: 101 (11/02/19 0840)  Resp: 20 (11/02/19 0840)  BP: 130/77 (11/02/19 0840)  SpO2: 98 % (11/02/19 0840) Vital Signs (24h Range):  Temp:  [96.1 °F (35.6 °C)-97.8 °F (36.6 °C)] 96.4 °F (35.8 °C)  Pulse:  [] 101  Resp:  [15-20] 20  SpO2:  [96 %-100 %] 98 %  BP: (125-147)/(66-77) 130/77     Weight: 38.2 kg (84 lb 3.5 oz)  Body mass index is 15.65 kg/m².  Body surface area is 1.29 meters squared.    No intake or output data in the 24 hours ending 11/02/19 0930    Physical Exam    Constitutional: She is oriented to person, place, and time.   Patient is an elderly 80 yo F who appears cachetic, frail and in NAD.    HENT:   C-collar in place  Bruising noted over left occiput   Poor of hearing   Eyes: Pupils are equal, round, and reactive to light. EOM are normal.   Neck: No JVD present.   Cardiovascular: Normal rate and intact distal pulses.   Pulmonary/Chest: Effort normal and breath sounds normal. She has no wheezes.   Abdominal: Soft. Bowel sounds are normal. She exhibits no distension. There is no tenderness.   Musculoskeletal: Normal range of motion. She exhibits no edema.   Strength 4/5 in BL LE.  Strength 5/5 in UE.  Sensation is grossly intact.    Neurological: She is alert and oriented to person, place, and time.   Psychiatric: She has a normal mood and affect.       Significant Labs:   None    Diagnostic Results:  I have reviewed all pertinent imaging results/findings within the past 24 hours.

## 2019-11-02 NOTE — PLAN OF CARE
Pt free of fall this shift. Pain assessed and pt c/o severe pain on her back and prn oxycodone administered; pt verbalized moderate relief of pain. Pt disoriented to time and situation but able to verbalized her needs. Afebrile. Vss. Will continue to monitor pt.

## 2019-11-02 NOTE — ASSESSMENT & PLAN NOTE
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  Now with fracture of her C1 vertebrae and overall worsening performance status, she would not be eligible for further chemotherapy treatment.  Hospice care was discussed with the patient and her  and they are amenable to it moving forward.      Plan:  -speak with SW to discuss inpatient hospice options. Will begin comfort care measures and support.   -c/w home clonazepam 0.5mg TID   -PRN oxycodone 5mg q4h  -JOSEPH/SCDs for DVT ppx  -PRN zofran for nausea

## 2019-11-02 NOTE — PROGRESS NOTES
Bladder scan pt and was 452 mL residual. Dr. Alex notified and she said if pt does not void in about an hour then straight cath. A verbal order was given by Dr. Alex to do prn bladder scan and straight cath.    Straight cath and was 500 mL.

## 2019-11-02 NOTE — PROGRESS NOTES
Call received thru on call (Med/Onc) inquiring about hospice for this patient. Contacted pts. Spouse and discussed hospice care. Spouse states that pt. Has had 9 falls in the past 3 weeks (most recent being prior to admit) and he does not feel that he can care for her at this time. Spouse states that he has been staying up 24/7 and that he is exhausted. Spouse expressed guilt with himself about going to sleep for 2 hrs and pt. Fell while he was sleeping. Support provided to spouse and reassured him that he is doing all that he can to care for his wife. Spouse wants to have hospice but feels that pt. Needs to be in a facility with hospice. Explained to spouse that inpatient hospice is a short term option and that pt. Likely does not qualify. Per medical team, pt. Not requiring O2 and on minimal pain medication. Spouse OK with placement in a NH with hospice. Explained to spouse that this will likely not happen until Monday. Spouse Ok with this plan. Medical team notified. Will follow.

## 2019-11-02 NOTE — PLAN OF CARE
Pt free of falls/injuries throughout the shift. Bed locked, in lowest position, call bell within reach. Pt afebrile, pain assess & treated. VSS, pt in no distress, daughter at the bs, pt slept 8+,  will continue to monitor.

## 2019-11-02 NOTE — SUBJECTIVE & OBJECTIVE
"Principal Problem:Small cell lung cancer    Principal Orthopedic Problem: Possible Non-displaced atlas fracture    Interval History: NAEON. In aspen collar. Pt denies neck pain, numbness, tingling. Pain is well controlled.    Review of patient's allergies indicates:   Allergen Reactions    Potassium Nausea Only       Current Facility-Administered Medications   Medication    albuterol inhaler 1 puff    clonazePAM tablet 0.5 mg    cyproheptadine 4 mg tablet 4 mg    dextrose 10% (D10W) Bolus    dextrose 10% (D10W) Bolus    fluticasone furoate-vilanterol 100-25 mcg/dose diskus inhaler 1 puff    glucagon (human recombinant) injection 1 mg    glucose chewable tablet 16 g    glucose chewable tablet 24 g    levothyroxine tablet 50 mcg    midodrine tablet 5 mg    ondansetron disintegrating tablet 8 mg    oxyCODONE immediate release tablet 5 mg    pantoprazole EC tablet 40 mg    senna-docusate 8.6-50 mg per tablet 2 tablet    sodium chloride 0.9% flush 10 mL    sodium chloride 0.9% flush 10 mL    sodium chloride 0.9% flush 10 mL    sulfamethoxazole-trimethoprim 400-80mg per tablet 1 tablet     Facility-Administered Medications Ordered in Other Encounters   Medication    alteplase injection 2 mg     Objective:     Vital Signs (Most Recent):  Temp: 96.1 °F (35.6 °C) (11/02/19 0618)  Pulse: 92 (11/02/19 0618)  Resp: 18 (11/02/19 0618)  BP: (!) 143/67 (11/02/19 0618)  SpO2: 96 % (11/02/19 0618) Vital Signs (24h Range):  Temp:  [96.1 °F (35.6 °C)-97.8 °F (36.6 °C)] 96.1 °F (35.6 °C)  Pulse:  [70-92] 92  Resp:  [15-19] 18  SpO2:  [96 %-100 %] 96 %  BP: (122-147)/(66-78) 143/67     Weight: 38.2 kg (84 lb 3.5 oz)  Height: 5' 1.5" (156.2 cm)  Body mass index is 15.65 kg/m².    No intake or output data in the 24 hours ending 11/02/19 0802    Ortho/SPM Exam     Physical Exam:  NAD, A/O x 3.  No focal motor or sensory deficits noted.  Drain: None  Aspen in place      Significant Labs: All pertinent labs within the " past 24 hours have been reviewed.    Significant Imaging: I have reviewed all pertinent imaging results/findings.

## 2019-11-02 NOTE — ASSESSMENT & PLAN NOTE
Rosita Almodovar is a 79 y.o. female presents s/p mechanical fall from standing with head trauma and neck pain. CT showing possible C1 right posterior arch fracture, unclear if this is a true fracture or an anatomic variant.    - Will treat conservatively, no surgical intervention at this time  - Aspen collar for 1 week  - Will f/u in clinic in 2 weeks  - No restrictions

## 2019-11-02 NOTE — PROGRESS NOTES
"[facility]  Orthopedics  Progress Note    Patient Name: Rosita Almodovar  MRN: [id number]  Admission Date: 11/1/2019  Hospital Length of Stay: 1 days  Attending Provider: Reba Alex MD  Primary Care Provider: Florecita Jameson NP    Subjective:     Principal Problem:Small cell lung cancer    Principal Orthopedic Problem: Possible Non-displaced atlas fracture    Interval History: NAEON. In aspen collar. Pt denies neck pain, numbness, tingling. Pain is well controlled.    Review of patient's allergies indicates:   Allergen Reactions    Potassium Nausea Only       Current Facility-Administered Medications   Medication    albuterol inhaler 1 puff    clonazePAM tablet 0.5 mg    cyproheptadine 4 mg tablet 4 mg    dextrose 10% (D10W) Bolus    dextrose 10% (D10W) Bolus    fluticasone furoate-vilanterol 100-25 mcg/dose diskus inhaler 1 puff    glucagon (human recombinant) injection 1 mg    glucose chewable tablet 16 g    glucose chewable tablet 24 g    levothyroxine tablet 50 mcg    midodrine tablet 5 mg    ondansetron disintegrating tablet 8 mg    oxyCODONE immediate release tablet 5 mg    pantoprazole EC tablet 40 mg    senna-docusate 8.6-50 mg per tablet 2 tablet    sodium chloride 0.9% flush 10 mL    sodium chloride 0.9% flush 10 mL    sodium chloride 0.9% flush 10 mL    sulfamethoxazole-trimethoprim 400-80mg per tablet 1 tablet     Facility-Administered Medications Ordered in Other Encounters   Medication    alteplase injection 2 mg     Objective:     Vital Signs (Most Recent):  Temp: 96.1 °F (35.6 °C) (11/02/19 0618)  Pulse: 92 (11/02/19 0618)  Resp: 18 (11/02/19 0618)  BP: (!) 143/67 (11/02/19 0618)  SpO2: 96 % (11/02/19 0618) Vital Signs (24h Range):  Temp:  [96.1 °F (35.6 °C)-97.8 °F (36.6 °C)] 96.1 °F (35.6 °C)  Pulse:  [70-92] 92  Resp:  [15-19] 18  SpO2:  [96 %-100 %] 96 %  BP: (122-147)/(66-78) 143/67     Weight: 38.2 kg (84 lb 3.5 oz)  Height: 5' 1.5" (156.2 " cm)  Body mass index is 15.65 kg/m².    No intake or output data in the 24 hours ending 11/02/19 0802    Ortho/SPM Exam     Physical Exam:  NAD, A/O x 3.  No focal motor or sensory deficits noted.  Drain: None  Aspen in place      Significant Labs: All pertinent labs within the past 24 hours have been reviewed.    Significant Imaging: I have reviewed all pertinent imaging results/findings.    Assessment/Plan:     Closed nondisplaced fracture of posterior arch of first cervical vertebra  Rosita Almodovar is a 79 y.o. female presents s/p mechanical fall from standing with head trauma and neck pain. CT showing possible C1 right posterior arch fracture, unclear if this is a true fracture or an anatomic variant.    - Will treat conservatively, no surgical intervention at this time  - Aspen collar for 1 week  - Will f/u in clinic in 2 weeks  - No restrictions          Manish Grimaldo MD  Orthopedics  [facility]

## 2019-11-03 LAB — BACTERIA UR CULT: ABNORMAL

## 2019-11-03 PROCEDURE — 25000003 PHARM REV CODE 250: Performed by: STUDENT IN AN ORGANIZED HEALTH CARE EDUCATION/TRAINING PROGRAM

## 2019-11-03 PROCEDURE — 11000001 HC ACUTE MED/SURG PRIVATE ROOM

## 2019-11-03 PROCEDURE — 99232 SBSQ HOSP IP/OBS MODERATE 35: CPT | Mod: GC,,, | Performed by: INTERNAL MEDICINE

## 2019-11-03 PROCEDURE — 94761 N-INVAS EAR/PLS OXIMETRY MLT: CPT

## 2019-11-03 PROCEDURE — 99232 PR SUBSEQUENT HOSPITAL CARE,LEVL II: ICD-10-PCS | Mod: GC,,, | Performed by: INTERNAL MEDICINE

## 2019-11-03 RX ADMIN — CYPROHEPTADINE HYDROCHLORIDE 4 MG: 4 TABLET ORAL at 09:11

## 2019-11-03 RX ADMIN — OXYCODONE HYDROCHLORIDE 5 MG: 5 TABLET ORAL at 05:11

## 2019-11-03 RX ADMIN — CYPROHEPTADINE HYDROCHLORIDE 4 MG: 4 TABLET ORAL at 04:11

## 2019-11-03 RX ADMIN — CYPROHEPTADINE HYDROCHLORIDE 4 MG: 4 TABLET ORAL at 12:11

## 2019-11-03 RX ADMIN — OXYCODONE HYDROCHLORIDE 5 MG: 5 TABLET ORAL at 04:11

## 2019-11-03 RX ADMIN — MIDODRINE HYDROCHLORIDE 5 MG: 5 TABLET ORAL at 09:11

## 2019-11-03 RX ADMIN — SULFAMETHOXAZOLE AND TRIMETHOPRIM 1 TABLET: 800; 160 TABLET ORAL at 09:11

## 2019-11-03 RX ADMIN — CLONAZEPAM 0.5 MG: 0.5 TABLET ORAL at 09:11

## 2019-11-03 RX ADMIN — CLONAZEPAM 0.5 MG: 0.5 TABLET ORAL at 02:11

## 2019-11-03 RX ADMIN — PANTOPRAZOLE SODIUM 40 MG: 40 TABLET, DELAYED RELEASE ORAL at 09:11

## 2019-11-03 RX ADMIN — LEVOTHYROXINE SODIUM 50 MCG: 50 TABLET ORAL at 07:11

## 2019-11-03 RX ADMIN — OXYCODONE HYDROCHLORIDE 5 MG: 5 TABLET ORAL at 10:11

## 2019-11-03 NOTE — PROGRESS NOTES
[facility]  Hematology/Oncology  Progress Note    Patient Name: Rosita Almodovar  Admission Date: 11/1/2019  Hospital Length of Stay: 2 days  Code Status: DNR     Subjective:     HPI:  Patient is a 78 yo F with PMHx of extensive stage SCLC s/p 4 cycles of carboplatin etoposide (currently on maintenance atezolizumab) who presented to [facility] as a transfer from [place] with a C1 fracture after falling.  Patient is very hard of hearing.  Patient's  was present for the history and provided much of the detail.  She states that she fell around 2:05am this morning when she got up to use the restroom.  Per the patient's , she lost her balance and hit her head on the door hinge.  He states that she has fallen several times in the last few weeks.  She endorses WL over the last year and a decreased appetite, which has not responded to several appetite stimulant medications.  She denies any pain in her neck at the moment, but she is in a C collar.  She denies any new onset numbness, tingling, headache, weakness, vision changes, chest pain, or shortness of breath.  She recently saw her Oncologist, Dr Atkinson, where she received her last dose of atezolizumab on 10/23/19.  He introduced the conversation of potential hospice in the future for her as she has had limited improvement in her symptoms.      The following oncology hx is per Dr. 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  She was started on carboplatin, etoposide, and atezolizumab.  No focal neurologic deficits and thus WBRT was initially deferred.  Completed 4 cycles of carbo/etopo/atezo 6/7/19-8/21/19.  Was started on WBRT 3Gy/Fx and completed 10Fx for total dose 30Gy 10/1/19-10/16/19.   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  See comment.  Comment:  The left lung biopsy shows a malignant proliferation of cells with increased nuclear cytoplasmic ratio, stippled chromatin pattern with small amount of cytoplasm. Some of cells show molding and crush artifact. The majority of the cells are relatively small in size, however there are a few areas with larger cells within increased amount of cytoplasm. The cells are arranged in sheets and nests with brisk mitotic activity and areas of necrosis. Immunohistochemical stains reveal the tumor cells to stain positively with cytokeratin AE1/AE3, synaptophysin, chromogranin and TTF-1. Immunohistochemical stain for p63 is negative within the tumor cells. Immunostain for cytokeratin 7 shows positive staining in the background lung but is negative within tumor cells. All stains have atisfactory positive negative controls. 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 Correlate clinically.       Interval History: Patient seen and examined.  She endorses pain in her lower back and neck overnight that resolved with PRN oxycodone.  She has also noted limited urinary output that has required her to be straight cathed. Patient states that she would like to go home rather than to a nursing facility.  Explained to patient that due to her risk of falls and her 's concern about being able to care for all her needs that she would likely  be a better candidate for a NH.     Oncology Treatment Plan:   OP ATEZOLIZUMAB CARBOPLATIN ETOPOSIDE Q3W    Medications:  Continuous Infusions:  Scheduled Meds:   clonazePAM  0.5 mg Oral TID    cyproheptadine  4 mg Oral QID    fluticasone furoate-vilanterol  1 puff Inhalation Daily    levothyroxine  50 mcg Oral Before breakfast    midodrine  5 mg Oral Daily    pantoprazole  40 mg Oral Daily    sulfamethoxazole-trimethoprim 800-160mg  1 tablet Oral BID     PRN Meds:albuterol, Dextrose 10% Bolus, Dextrose 10% Bolus, glucagon (human recombinant), glucose, glucose, ondansetron, oxyCODONE, senna-docusate 8.6-50 mg, sodium chloride 0.9%, sodium chloride 0.9%, sodium chloride 0.9%     Review of Systems   Constitutional: Negative for fatigue and fever.   Eyes: Negative for visual disturbance.   Respiratory: Negative for cough and shortness of breath.    Cardiovascular: Negative for chest pain and leg swelling.   Gastrointestinal: Negative for abdominal distention, abdominal pain, constipation, diarrhea and nausea.   Genitourinary: Negative for dysuria.   Musculoskeletal: Negative for arthralgias and back pain.     Objective:     Vital Signs (Most Recent):  Temp: 97.5 °F (36.4 °C) (11/03/19 0524)  Pulse: 98 (11/03/19 0524)  Resp: 20 (11/03/19 0524)  BP: 123/68 (11/03/19 0524)  SpO2: 97 % (11/03/19 0524) Vital Signs (24h Range):  Temp:  [96.4 °F (35.8 °C)-98.1 °F (36.7 °C)] 97.5 °F (36.4 °C)  Pulse:  [] 98  Resp:  [18-20] 20  SpO2:  [95 %-99 %] 97 %  BP: (119-130)/(64-77) 123/68     Weight: 38.2 kg (84 lb 3.5 oz)  Body mass index is 15.65 kg/m².  Body surface area is 1.29 meters squared.      Intake/Output Summary (Last 24 hours) at 11/3/2019 0831  Last data filed at 11/2/2019 1850  Gross per 24 hour   Intake 150 ml   Output 850 ml   Net -700 ml       Physical Exam   Constitutional: She is oriented to person, place, and time.   Patient is an elderly 80 yo F who appears cachetic, frail and in NAD.    HENT:    C-collar in place  Bruising noted over left occiput      Eyes: Pupils are equal, round, and reactive to light. EOM are normal.   Neck: No JVD present.   Cardiovascular: Normal rate and intact distal pulses.   Pulmonary/Chest: Effort normal and breath sounds normal. She has no wheezes.   Abdominal: Soft. Bowel sounds are normal. She exhibits no distension. There is no tenderness.   Musculoskeletal: Normal range of motion. She exhibits no edema.   Strength 4/5 in BL LE.  Strength 5/5 in UE.  Sensation is grossly intact.    Neurological: She is alert and oriented to person, place, and time.   Psychiatric: She has a normal mood and affect.       Significant Labs:   None    Diagnostic Results:  None    Assessment/Plan:     * Small cell lung cancer  Patient is a 78 yo F with PMHx of extensive stage SCLC s/p 4 cycles of carboplatin etoposide (currently on maintenance atezolizumab.) Patient has shown increase weakness, fatigue and falls at home over the last few months.  Now with fracture of her C1 vertebrae and overall worsening performance status, she would not be eligible for further chemotherapy treatment.  Hospice care was discussed with the patient and her  and they are amenable to it moving forward.      Plan:  -SW spoke with patient and family.  Decided that nursing home placement with hospice would be the best route for her.  Plan to find potential placement for her on Monday to go to NH with hospice.   -continue comfort care measures .   -c/w home clonazepam 0.5mg TID   -PRN oxycodone 5mg q4h  -JOSEPH/SCDs for DVT ppx  -PRN zofran for nausea     Closed nondisplaced fracture of posterior arch of first cervical vertebra  Patient fell from standing height and struck her head/neck door hinge.    CT neck (11/1) shows possible C1 right posterior arch fracture    -Consulted orthopedics, appreciate recommendations   -Ortho does not recommend surgical intervention at this time   -Continue Aspen collar for 1  week         UTI (urinary tract infection)  UA (+) for nitrites, leukocytes, and bacteria.   -continue PO bactrim BID for 5 days  -likely contributing to her urinary rentention. Will hold off on dinero placement for now.  Continue bladder scans with intermittent catheterization.     Acquired hypothyroidism  -c/w home levothyroxine              Champ Trammell MD  Hematology/Oncology  [facility]

## 2019-11-03 NOTE — SUBJECTIVE & OBJECTIVE
Interval History: Patient seen and examined.  She endorses pain in her lower back and neck overnight that resolved with PRN oxycodone.  She has also noted limited urinary output that has required her to be straight cathed. Patient states that she would like to go home rather than to a nursing facility.  Explained to patient that due to her risk of falls and her 's concern about being able to care for all her needs that she would likely be a better candidate for a NH.     Oncology Treatment Plan:   OP ATEZOLIZUMAB CARBOPLATIN ETOPOSIDE Q3W    Medications:  Continuous Infusions:  Scheduled Meds:   clonazePAM  0.5 mg Oral TID    cyproheptadine  4 mg Oral QID    fluticasone furoate-vilanterol  1 puff Inhalation Daily    levothyroxine  50 mcg Oral Before breakfast    midodrine  5 mg Oral Daily    pantoprazole  40 mg Oral Daily    sulfamethoxazole-trimethoprim 800-160mg  1 tablet Oral BID     PRN Meds:albuterol, Dextrose 10% Bolus, Dextrose 10% Bolus, glucagon (human recombinant), glucose, glucose, ondansetron, oxyCODONE, senna-docusate 8.6-50 mg, sodium chloride 0.9%, sodium chloride 0.9%, sodium chloride 0.9%     Review of Systems   Constitutional: Negative for fatigue and fever.   Eyes: Negative for visual disturbance.   Respiratory: Negative for cough and shortness of breath.    Cardiovascular: Negative for chest pain and leg swelling.   Gastrointestinal: Negative for abdominal distention, abdominal pain, constipation, diarrhea and nausea.   Genitourinary: Negative for dysuria.   Musculoskeletal: Negative for arthralgias and back pain.     Objective:     Vital Signs (Most Recent):  Temp: 97.5 °F (36.4 °C) (11/03/19 0524)  Pulse: 98 (11/03/19 0524)  Resp: 20 (11/03/19 0524)  BP: 123/68 (11/03/19 0524)  SpO2: 97 % (11/03/19 0524) Vital Signs (24h Range):  Temp:  [96.4 °F (35.8 °C)-98.1 °F (36.7 °C)] 97.5 °F (36.4 °C)  Pulse:  [] 98  Resp:  [18-20] 20  SpO2:  [95 %-99 %] 97 %  BP: (119-130)/(64-77)  123/68     Weight: 38.2 kg (84 lb 3.5 oz)  Body mass index is 15.65 kg/m².  Body surface area is 1.29 meters squared.      Intake/Output Summary (Last 24 hours) at 11/3/2019 0831  Last data filed at 11/2/2019 1850  Gross per 24 hour   Intake 150 ml   Output 850 ml   Net -700 ml       Physical Exam   Constitutional: She is oriented to person, place, and time.   Patient is an elderly 80 yo F who appears cachetic, frail and in NAD.    HENT:   C-collar in place  Bruising noted over left occiput      Eyes: Pupils are equal, round, and reactive to light. EOM are normal.   Neck: No JVD present.   Cardiovascular: Normal rate and intact distal pulses.   Pulmonary/Chest: Effort normal and breath sounds normal. She has no wheezes.   Abdominal: Soft. Bowel sounds are normal. She exhibits no distension. There is no tenderness.   Musculoskeletal: Normal range of motion. She exhibits no edema.   Strength 4/5 in BL LE.  Strength 5/5 in UE.  Sensation is grossly intact.    Neurological: She is alert and oriented to person, place, and time.   Psychiatric: She has a normal mood and affect.       Significant Labs:   None    Diagnostic Results:  None

## 2019-11-03 NOTE — NURSING
Patient goes in and out with slight confusion.  Did not touch her lunch after encouragement of  at bedside.  Complaining of pain again to her mid lower back area. Patient alert at this time and states pain in a 10/10.  Will administer prn oxycodone 5 mg tab for c/o pain will continue to monitor. Patient had oxycodone 5mg tab at 1043 stated pain was a 7/10.  After an hour patient staed pain went down to a 4.  Assessment ongoing.

## 2019-11-03 NOTE — PLAN OF CARE
VS and assessment performed per orders. Pain medication given as ordered, moderate relief. C-collar in place. Pt still having difficulty voiding, bladder scans done Q6H, last bladder scan @5am volume of 195. Pt with poor appetite, drinking few sips of water/juice. Pt free of injury or falls.

## 2019-11-03 NOTE — ASSESSMENT & PLAN NOTE
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  Now with fracture of her C1 vertebrae and overall worsening performance status, she would not be eligible for further chemotherapy treatment.  Hospice care was discussed with the patient and her  and they are amenable to it moving forward.      Plan:  -SW spoke with patient and family.  Decided that nursing home placement with hospice would be the best route for her.  Plan to find potential placement for her on Monday to go to NH with hospice.   -continue comfort care measures .   -c/w home clonazepam 0.5mg TID   -PRN oxycodone 5mg q4h  -JOSEPH/SCDs for DVT ppx  -PRN zofran for nausea

## 2019-11-03 NOTE — ASSESSMENT & PLAN NOTE
UA (+) for nitrites, leukocytes, and bacteria.   -continue PO bactrim BID for 5 days  -likely contributing to her urinary rentention. Will hold off on dinero placement for now.  Continue bladder scans with intermittent catheterization.

## 2019-11-03 NOTE — NURSING
1100 bladder scan done only 175ml  noted to bladder.  Rechecked and did another bladder scan at 1700 only 236ml  noted in bladder.  Did not have to catheter patient due to bladder not beinf full.  Asseeement ongoing.  Will continue to monitor.

## 2019-11-04 VITALS
OXYGEN SATURATION: 97 % | BODY MASS INDEX: 15.49 KG/M2 | HEART RATE: 102 BPM | TEMPERATURE: 98 F | RESPIRATION RATE: 16 BRPM | WEIGHT: 84.19 LBS | DIASTOLIC BLOOD PRESSURE: 68 MMHG | SYSTOLIC BLOOD PRESSURE: 124 MMHG | HEIGHT: 62 IN

## 2019-11-04 PROCEDURE — 86580 TB INTRADERMAL TEST: CPT | Performed by: STUDENT IN AN ORGANIZED HEALTH CARE EDUCATION/TRAINING PROGRAM

## 2019-11-04 PROCEDURE — 25000242 PHARM REV CODE 250 ALT 637 W/ HCPCS: Performed by: STUDENT IN AN ORGANIZED HEALTH CARE EDUCATION/TRAINING PROGRAM

## 2019-11-04 PROCEDURE — 30200315 PPD INTRADERMAL TEST REV CODE 302: Performed by: STUDENT IN AN ORGANIZED HEALTH CARE EDUCATION/TRAINING PROGRAM

## 2019-11-04 PROCEDURE — 99239 HOSP IP/OBS DSCHRG MGMT >30: CPT | Mod: GC,,, | Performed by: INTERNAL MEDICINE

## 2019-11-04 PROCEDURE — 25000003 PHARM REV CODE 250: Performed by: STUDENT IN AN ORGANIZED HEALTH CARE EDUCATION/TRAINING PROGRAM

## 2019-11-04 PROCEDURE — 97802 MEDICAL NUTRITION INDIV IN: CPT

## 2019-11-04 PROCEDURE — 99239 PR HOSPITAL DISCHARGE DAY,>30 MIN: ICD-10-PCS | Mod: GC,,, | Performed by: INTERNAL MEDICINE

## 2019-11-04 RX ORDER — MIDODRINE HYDROCHLORIDE 5 MG/1
5 TABLET ORAL
Status: CANCELLED
Start: 2019-11-04

## 2019-11-04 RX ORDER — ONDANSETRON 8 MG/1
8 TABLET, ORALLY DISINTEGRATING ORAL EVERY 8 HOURS PRN
Qty: 30 TABLET | Refills: 3 | Status: CANCELLED
Start: 2019-11-04

## 2019-11-04 RX ORDER — ALBUTEROL SULFATE 90 UG/1
AEROSOL, METERED RESPIRATORY (INHALATION)
Status: CANCELLED
Start: 2019-11-04

## 2019-11-04 RX ORDER — LEVOTHYROXINE SODIUM 50 UG/1
TABLET ORAL
Qty: 30 TABLET | Refills: 3 | Status: CANCELLED
Start: 2019-11-04

## 2019-11-04 RX ORDER — CLONAZEPAM 0.5 MG/1
TABLET ORAL
Refills: 3 | Status: CANCELLED
Start: 2019-11-04

## 2019-11-04 RX ORDER — OXYCODONE HYDROCHLORIDE 5 MG/1
5 TABLET ORAL EVERY 4 HOURS PRN
Refills: 0
Start: 2019-11-04

## 2019-11-04 RX ORDER — TRAMADOL HYDROCHLORIDE 50 MG/1
50 TABLET ORAL EVERY 6 HOURS PRN
Qty: 120 TABLET | Refills: 0 | Status: CANCELLED
Start: 2019-11-04

## 2019-11-04 RX ORDER — PANTOPRAZOLE SODIUM 40 MG/1
TABLET, DELAYED RELEASE ORAL
Refills: 3 | Status: CANCELLED
Start: 2019-11-04

## 2019-11-04 RX ORDER — SULFAMETHOXAZOLE AND TRIMETHOPRIM 800; 160 MG/1; MG/1
1 TABLET ORAL ONCE
Status: COMPLETED | OUTPATIENT
Start: 2019-11-04 | End: 2019-11-04

## 2019-11-04 RX ORDER — CLOPIDOGREL BISULFATE 75 MG/1
75 TABLET ORAL DAILY
Qty: 30 TABLET | Refills: 2 | Status: SHIPPED | OUTPATIENT
Start: 2019-11-04

## 2019-11-04 RX ADMIN — CLONAZEPAM 0.5 MG: 0.5 TABLET ORAL at 03:11

## 2019-11-04 RX ADMIN — PANTOPRAZOLE SODIUM 40 MG: 40 TABLET, DELAYED RELEASE ORAL at 09:11

## 2019-11-04 RX ADMIN — CLONAZEPAM 0.5 MG: 0.5 TABLET ORAL at 09:11

## 2019-11-04 RX ADMIN — LEVOTHYROXINE SODIUM 50 MCG: 50 TABLET ORAL at 06:11

## 2019-11-04 RX ADMIN — MIDODRINE HYDROCHLORIDE 5 MG: 5 TABLET ORAL at 09:11

## 2019-11-04 RX ADMIN — SULFAMETHOXAZOLE AND TRIMETHOPRIM 1 TABLET: 800; 160 TABLET ORAL at 09:11

## 2019-11-04 RX ADMIN — TUBERCULIN PURIFIED PROTEIN DERIVATIVE 5 UNITS: 5 INJECTION, SOLUTION INTRADERMAL at 10:11

## 2019-11-04 RX ADMIN — CYPROHEPTADINE HYDROCHLORIDE 4 MG: 4 TABLET ORAL at 02:11

## 2019-11-04 RX ADMIN — ALBUTEROL SULFATE 1 PUFF: 90 AEROSOL, METERED RESPIRATORY (INHALATION) at 09:11

## 2019-11-04 RX ADMIN — OXYCODONE HYDROCHLORIDE 5 MG: 5 TABLET ORAL at 03:11

## 2019-11-04 RX ADMIN — CYPROHEPTADINE HYDROCHLORIDE 4 MG: 4 TABLET ORAL at 09:11

## 2019-11-04 RX ADMIN — FLUTICASONE FUROATE AND VILANTEROL TRIFENATATE 1 PUFF: 100; 25 POWDER RESPIRATORY (INHALATION) at 09:11

## 2019-11-04 NOTE — NURSING
Pt DC'd to hospice facility respirations are even and unlabored mild c/o pain no s/s of distress pt left off unit via stretcher per ree no personal belongings left in room

## 2019-11-04 NOTE — PROGRESS NOTES
[facility]  Hematology/Oncology  Progress Note    Patient Name: Rosita Almodovar  Admission Date: 11/1/2019  Hospital Length of Stay: 3 days  Code Status: DNR     Subjective:     HPI:  Patient is a 80 yo F with PMHx of extensive stage SCLC s/p 4 cycles of carboplatin etoposide (currently on maintenance atezolizumab) who presented to [place] as a transfer from [place] with a C1 fracture after falling.  Patient is very hard of hearing.  Patient's  was present for the history and provided much of the detail.  She states that she fell around 2:05am this morning when she got up to use the restroom.  Per the patient's , she lost her balance and hit her head on the door hinge.  He states that she has fallen several times in the last few weeks.  She endorses WL over the last year and a decreased appetite, which has not responded to several appetite stimulant medications.  She denies any pain in her neck at the moment, but she is in a C collar.  She denies any new onset numbness, tingling, headache, weakness, vision changes, chest pain, or shortness of breath.  She recently saw her Oncologist, Dr Atkinson, where she received her last dose of atezolizumab on 10/23/19.  He introduced the conversation of potential hospice in the future for her as she has had limited improvement in her symptoms.      The following oncology hx is per Dr. 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  She was started on carboplatin, etoposide, and atezolizumab.  No focal neurologic deficits and thus WBRT was initially deferred.  Completed 4 cycles of carbo/etopo/atezo 6/7/19-8/21/19.  Was started on WBRT 3Gy/Fx and completed 10Fx for total dose 30Gy 10/1/19-10/16/19.   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  See comment.  Comment:  The left lung biopsy shows a malignant proliferation of cells with increased nuclear cytoplasmic ratio, stippled chromatin pattern with small amount of cytoplasm. Some of cells show molding and crush artifact. The majority of the cells are relatively small in size, however there are a few areas with larger cells within increased amount of cytoplasm. The cells are arranged in sheets and nests with brisk mitotic activity and areas of necrosis. Immunohistochemical stains reveal the tumor cells to stain positively with cytokeratin AE1/AE3, synaptophysin, chromogranin and TTF-1. Immunohistochemical stain for p63 is negative within the tumor cells. Immunostain for cytokeratin 7 shows positive staining in the background lung but is negative within tumor cells. All stains have atisfactory positive negative controls. 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 Correlate clinically.       Interval History:     Patient reports she is doing well. She states she has some lower back pain, but it is manageable at present. She would like to get out of bed and walk. She reports she is hungry and would like to eat her breakfast. Denies any abdominal pain, though she reports intermittent tenderness diffusely.       Oncology Treatment Plan:   OP ATEZOLIZUMAB CARBOPLATIN ETOPOSIDE Q3W    Medications:  Continuous Infusions:  Scheduled  Meds:   clonazePAM  0.5 mg Oral TID    cyproheptadine  4 mg Oral QID    fluticasone furoate-vilanterol  1 puff Inhalation Daily    levothyroxine  50 mcg Oral Before breakfast    midodrine  5 mg Oral Daily    pantoprazole  40 mg Oral Daily    sulfamethoxazole-trimethoprim 800-160mg  1 tablet Oral BID     PRN Meds:albuterol, Dextrose 10% Bolus, Dextrose 10% Bolus, glucagon (human recombinant), glucose, glucose, ondansetron, oxyCODONE, senna-docusate 8.6-50 mg, sodium chloride 0.9%, sodium chloride 0.9%, sodium chloride 0.9%     Review of Systems   Constitutional: Negative for fatigue and fever.   HENT: Negative for congestion and rhinorrhea.    Eyes: Negative for photophobia and visual disturbance.   Respiratory: Negative for cough and shortness of breath.    Cardiovascular: Negative for chest pain and leg swelling.   Gastrointestinal: Negative for abdominal distention, abdominal pain, constipation, diarrhea, nausea and vomiting.   Genitourinary: Negative for dysuria.   Musculoskeletal: Positive for back pain. Negative for arthralgias.   Neurological: Negative for facial asymmetry and headaches.     Objective:     Vital Signs (Most Recent):  Temp: 98 °F (36.7 °C) (11/04/19 0708)  Pulse: 106 (11/04/19 0708)  Resp: 16 (11/04/19 0708)  BP: 125/72 (11/04/19 0708)  SpO2: (!) 94 % (11/04/19 0708) Vital Signs (24h Range):  Temp:  [97.5 °F (36.4 °C)-98.8 °F (37.1 °C)] 98 °F (36.7 °C)  Pulse:  [] 106  Resp:  [16-20] 16  SpO2:  [94 %-98 %] 94 %  BP: (115-133)/(64-75) 125/72     Weight: 38.2 kg (84 lb 3.5 oz)  Body mass index is 15.65 kg/m².  Body surface area is 1.29 meters squared.      Intake/Output Summary (Last 24 hours) at 11/4/2019 0836  Last data filed at 11/4/2019 0000  Gross per 24 hour   Intake --   Output 550 ml   Net -550 ml       Physical Exam   Constitutional: She is oriented to person, place, and time. No distress.   Cachetic, frail and in NAD, temporal wasting     HENT:   Head: Normocephalic and  atraumatic.   Right Ear: External ear normal.   Left Ear: External ear normal.   Nose: Nose normal.   Mouth/Throat: No oropharyngeal exudate.   C-collar in place  Bruising noted over left occiput      Eyes: Pupils are equal, round, and reactive to light. EOM are normal. No scleral icterus.   Conjunctival pallor   Neck: No tracheal deviation present.   Cardiovascular: Normal rate and intact distal pulses.   Pulses:       Radial pulses are 2+ on the right side, and 2+ on the left side.   Pulmonary/Chest: Effort normal and breath sounds normal. She has no wheezes.   Abdominal: Soft. Bowel sounds are normal. She exhibits no distension. There is no tenderness. There is no guarding.   Musculoskeletal: She exhibits no edema or tenderness.   Sensation is grossly intact.    Neurological: She is alert and oriented to person, place, and time.   Skin: Skin is warm and dry. No rash noted. She is not diaphoretic. No erythema.   Psychiatric: She has a normal mood and affect.   Nursing note and vitals reviewed.      Significant Labs:   None    Diagnostic Results:  None    Assessment/Plan:     * Small cell lung cancer  Patient is a 80 yo F with PMHx of extensive stage SCLC s/p 4 cycles of carboplatin etoposide (currently on maintenance atezolizumab.) Patient has shown increase weakness, fatigue and falls at home over the last few months.  Now with fracture of her C1 vertebrae and overall worsening performance status, she would not be eligible for further chemotherapy treatment.  Hospice care was discussed with the patient and her  and they are amenable to it moving forward.      Plan:  - SW spoke with patient and family.  Decided that nursing home placement with hospice would be the best route for her.  Plan to find potential placement for her on Monday to go to NH with hospice.   - Continue comfort care measures .   - Continue home clonazepam 0.5mg TID   - PRN oxycodone 5mg q4h  - JOSEPH/SCDs for DVT ppx  - PRN zofran for  nausea     UTI (urinary tract infection)  UA (+) for nitrites, leukocytes, and bacteria.   - Continue PO bactrim BID for 5 days  - Likely contributing to her urinary rentention. Will hold off on dinero placement for now.  Continue bladder scans with intermittent catheterization q6 hours     Closed nondisplaced fracture of posterior arch of first cervical vertebra  Patient fell from standing height and struck her head/neck door hinge.    CT neck (11/1) shows possible C1 right posterior arch fracture    - Consulted orthopedics, appreciate recommendations   - Ortho does not recommend surgical intervention at this time   - Continue Aspen collar for 1 week         Acquired hypothyroidism  - Continue home levothyroxine              Tremaine Howe DO  Hematology/Oncology  [facility]

## 2019-11-04 NOTE — ASSESSMENT & PLAN NOTE
Patient is a 78 yo F with PMHx of extensive stage SCLC s/p 4 cycles of carboplatin etoposide (currently on maintenance atezolizumab.) Patient has shown increase weakness, fatigue and falls at home over the last few months.  Now with fracture of her C1 vertebrae and overall worsening performance status, she would not be eligible for further chemotherapy treatment.  Hospice care was discussed with the patient and her  and they are amenable to it moving forward.      Plan:  - SW spoke with patient and family.  Decided that nursing home placement with hospice would be the best route for her.  Plan to find potential placement for her on Monday to go to NH with hospice.   - Continue comfort care measures .   - Continue home clonazepam 0.5mg TID   - PRN oxycodone 5mg q4h  - JOSEPH/SCDs for DVT ppx  - PRN zofran for nausea

## 2019-11-04 NOTE — PROGRESS NOTES
Pt with urge to void, attempted to urinate but unable to. Bladder scan 600ml,  straight cath done 550ml removed. MD with med-oncology was notified.

## 2019-11-04 NOTE — PLAN OF CARE
[facility]  Department of Hospital Medicine  [address] [place] [address]  [phone] after hours  [phone] fax    HOSPICE  ORDERS    11/04/2019    Admit to Hospice:  Home [facility] Inpatient [facility]     Diagnoses:   Active Hospital Problems    Diagnosis  POA    *Small cell lung cancer [C34.90]  Unknown    Closed nondisplaced fracture of posterior arch of first cervical vertebra [S12.031A]  Yes    UTI (urinary tract infection) [N39.0]  Unknown    Acquired hypothyroidism [E03.9]  Yes      Resolved Hospital Problems   No resolved problems to display.       Hospice Qualifying Diagnoses: Small Cell Lung Carcinoma        Patient has a life expectancy < 6 months due to: SCLC  1) Primary Hospice Diagnosis:  SCLC  2) Comorbid Conditions Contributing to Decline: Closed nondisplaced fracture of posterior arch of first cervical vertebra     Vital Signs: Routine per Hospice Protocol.    Code Status: DNR    Allergies:   Review of patient's allergies indicates:   Allergen Reactions    Potassium Nausea Only       Diet: Regular Diet with Boost Plus TID (strawberry)     Activities: As tolerated    Nursing: Per Hospice Routine.      Routine Skin for Bedridden Patients: Apply moisture barrier cream to all skin folds and   wet areas in perineal area daily and after baths and all bowel movements.      Medications:      Aneta Almodovar   Home Medication Instructions LILIANA:[id number]    Printed on:11/04/19 1257   Medication Information                      albuterol (VENTOLIN HFA) 90 mcg/actuation inhaler  Ventolin HFA 90 mcg/actuation aerosol inhaler             atenolol (TENORMIN) 50 MG tablet  atenolol 50 mg tablet             clonazePAM (KLONOPIN) 0.5 MG tablet  TAKE ONE TABLET BY MOUTH THREE TIMES DAILY AS NEEDED FOR ANXIETY OR for panic attacks             cyproheptadine (PERIACTIN) 4 mg tablet  Take 1 tablet (4 mg total) by mouth 4 (four) times daily.              DECARA 50,000 unit capsule  Take 50,000 Units by mouth every 7 days.             fluticasone-umeclidin-vilanter (TRELEGY ELLIPTA) 100-62.5-25 mcg DsDv  Inhale 1 puff into the lungs once daily.             furosemide (LASIX) 20 MG tablet  TAKE ONE TABLET BY MOUTH EVERY DAY             levothyroxine (SYNTHROID) 50 MCG tablet  TAKE ONE TABLET BY MOUTH EVERY IN THE MORNING BEFORE BREAKFAST             lidocaine-prilocaine (EMLA) cream  Apply to port site 1 hour prior to chemotherapy and cover with saran wrap             lisinopril (PRINIVIL,ZESTRIL) 5 MG tablet  Take 5 mg by mouth once daily.              LORazepam (ATIVAN) 1 MG tablet  Take 1 tablet (1 mg total) by mouth as needed for Anxiety (Take 30 minutes prior to daily radiation treatment).             midodrine (PROAMATINE) 5 MG Tab               ondansetron (ZOFRAN-ODT) 8 MG TbDL  Dissolve 1 tablet (8 mg total) under tongue every 8 (eight) hours as needed (nausea).             pantoprazole (PROTONIX) 40 MG tablet  TAKE ONE TABLET BY MOUTH EVERY DAY FOR stomach             pravastatin (PRAVACHOL) 20 MG tablet  Take 20 mg by mouth once daily.              predniSONE (DELTASONE) 20 MG tablet  Take one daily for 3 days and may repeat for shortness of breath.             senna-docusate 8.6-50 mg (PERICOLACE) 8.6-50 mg per tablet  Take 2 tablets by mouth once daily.             sotalol (BETAPACE) 80 MG tablet  TAKE 1/2 TABLET BY MOUTH DAILY             tiotropium bromide 2.5 mcg/actuation Mist  Inhale 5 mcg into the lungs once daily. Controller             traMADol (ULTRAM) 50 mg tablet  Take 1 tablet (50 mg total) by mouth every 6 (six) hours as needed. Please fill greater than 7 day supply.Medication is medically necessary.                   Future Orders:  Hospice Medical Director may dictate new orders for comfortable care measures & sign death certificate.        _________________________________  Tremaine Howe DO  11/04/2019

## 2019-11-04 NOTE — PLAN OF CARE
Problem: Malnutrition  Goal: Improved Nutritional Intake  Outcome: Ongoing, Not Progressing  Intervention: Promote and Optimize Oral Intake  Flowsheets (Taken 11/4/2019 1112)  Oral Nutrition Promotion: nutritional therapy counseling provided; other (see comments) (add Boost Plus TID (strawberry))     Recommendations     Recommendation:   1. Continue regular diet as tolerated.   2. Recommend Boost Plus TID (strawberry).   3. RD to monitor & follow up.     Goals: PO intake > 50% by RD follow up  Nutrition Goal Status: new  Communication of RD Recs: other (comment)(POC)

## 2019-11-04 NOTE — PROGRESS NOTES
[facility] met with pt/ and consents being obtained for patient to be admitted to the [facility] inpt hospice unit probably today.  Pt/ aware and in agreement with plan.  S.w med onc team requesting hospice orders and d/c order as appropriate.  Will upload to [facility] when entered into [facility].

## 2019-11-04 NOTE — PLAN OF CARE
MDRs completed with Dr. Alex and the team. Patient of Dr. 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 Patient [place] [facility] [place] with a C1 fracture after falling. Patient vital signs are stable. Patient is also afebrile. O2 sats 94-97% on room air. Patient completed 3 day course of Bactrim today for her UTI. Per MD the patient will not receive any further chemotherapy.   Plans for the patient to discharge to a facility (nursing home) with Palliative Care. MD ordered TB skin test for placement. CXR already completed on admit.  to assist the patient and her family with placement. CM to continue to follow with the team.    Radha Lazaro, RN, BSN, CM  [facility]  Nurse - Med Onc/Gyn Onc

## 2019-11-04 NOTE — PROGRESS NOTES
"[facility]  Adult Nutrition  Progress Note    SUMMARY       Recommendations    Recommendation:   1. Continue regular diet as tolerated.   2. Recommend Boost Plus TID (strawberry).   3. RD to monitor & follow up.    Goals: PO intake > 50% by RD follow up  Nutrition Goal Status: new  Communication of RD Recs: other (comment)(POC)    Reason for Assessment    Reason For Assessment: identified at risk by screening criteria(MST > 3)  Diagnosis: cancer diagnosis/related complications(small cell lung cancer)  Relevant Medical History: SCLC s/p 4 cycles of chemo  Interdisciplinary Rounds: did not attend  General Information Comments: Pt resting in bed with  at bedside.  reports that pt has mostly been eating a few bites of meals since admission, and has had poor appetite/PO intake since May. He reports that pt's UBW is 160# and endorses wt loss. Per chart review, pt w/ a 57# (40%) wt loss x 9 months. NFPE completed today, pt with severe muscle/fat wasting and meets criteria for severe malnutrition. Pt and  agreeable to receiving Boost (pt likes strawberry).  Nutrition Discharge Planning: Adequate PO intake to meet needs    Nutrition Risk Screen    Nutrition Risk Screen: other (see comments)    Nutrition/Diet History    Spiritual, Cultural Beliefs, Latter day Practices, Values that Affect Care: yes    Anthropometrics    Temp: 98 °F (36.7 °C)  Height Method: Stated  Height: 5' 1.5" (156.2 cm)  Height (inches): 61.5 in  Weight Method: Bed Scale  Weight: 38.2 kg (84 lb 3.5 oz)  Weight (lb): 84.22 lb  Ideal Body Weight (IBW), Female: 107.5 lb  % Ideal Body Weight, Female (lb): 78.34 lb  BMI (Calculated): 15.7       Lab/Procedures/Meds    Pertinent Labs Reviewed: reviewed  Pertinent Labs Comments: Na 135, Glu 147, Alb 2.9  Pertinent Medications Reviewed: reviewed  Pertinent Medications Comments: pantoprazole, senna-docusate    Estimated/Assessed Needs    Weight Used For Calorie Calculations: " 38.2 kg (84 lb 3.5 oz)  Energy Calorie Requirements (kcal): 4329-1904 kcal/day  Energy Need Method: Kcal/kg(35-40 kcal/day)  Protein Requirements: 57-77 g/day(1.5-2 g/kg)  Weight Used For Protein Calculations: 38.2 kg (84 lb 3.5 oz)  Fluid Requirements (mL): per MD or 1 mL/kcal     RDA Method (mL): 1337    Nutrition Prescription Ordered    Current Diet Order: Regular    Evaluation of Received Nutrient/Fluid Intake    I/O: -1.3L since admit  Comments: LBM 11/1  % Intake of Estimated Energy Needs: 0 - 25 %  % Meal Intake: 0 - 25 %    Nutrition Risk    Level of Risk/Frequency of Follow-up: (1x/week)     Assessment and Plan    Severe malnutrition in the context of Chronic Illness/Injury    Related to (etiology):  Poor appetite, increased nutrient needs 2/2 small cell lung cancer    Signs and Symptoms (as evidenced by):  Energy Intake: <50% of estimated energy requirement for 6 months  Body Fat Depletion: severe depletion of triceps, moderate depletion of orbitals   Muscle Mass Depletion: severe depletion of temples, interosseous muscle and lower extremities   Weight Loss: 40% x 9 months    Interventions (treatment strategy):  Collaboration of care with other providers  Commercial beverage - Boost Plus TID    Nutrition Diagnosis Status:  New     Monitor and Evaluation    Food and Nutrient Intake: energy intake, food and beverage intake  Food and Nutrient Adminstration: diet order  Anthropometric Measurements: weight, weight change, body mass index  Biochemical Data, Medical Tests and Procedures: electrolyte and renal panel, gastrointestinal profile, glucose/endocrine profile, inflammatory profile, lipid profile  Nutrition-Focused Physical Findings: overall appearance     Malnutrition Assessment  Malnutrition Type: chronic illness  Energy Intake: severe energy intake      Weight Loss (Malnutrition): other (see comments)(40% x 9 months)  Energy Intake (Malnutrition): less than or equal to 50% for greater than or equal to 1  month   Orbital Region (Subcutaneous Fat Loss): moderate depletion  Upper Arm Region (Subcutaneous Fat Loss): severe depletion   [place] Region (Muscle Loss): severe depletion  Clavicle Bone Region (Muscle Loss): (SILVESTRE - pt wearing C collar)  Dorsal Hand (Muscle Loss): severe depletion  Patellar Region (Muscle Loss): severe depletion  Anterior Thigh Region (Muscle Loss): severe depletion  Posterior Calf Region (Muscle Loss): (SILVESTRE  - pt w/ compressors on legs)     Nutrition Follow-Up    RD Follow-up?: Yes

## 2019-11-04 NOTE — ASSESSMENT & PLAN NOTE
Patient fell from standing height and struck her head/neck door hinge.    CT neck (11/1) shows possible C1 right posterior arch fracture    - Consulted orthopedics, appreciate recommendations   - Ortho does not recommend surgical intervention at this time   - Continue Aspen collar for 1 week

## 2019-11-04 NOTE — PLAN OF CARE
[facility]  Department of Hospital Medicine  [address] [place] [address]  [phone] after hours  [phone] fax    HOSPICE  ORDERS    11/04/2019    Admit to Hospice:  Home Service Inpatient Service- [facility]     Diagnoses:   Active Hospital Problems    Diagnosis  POA    *Small cell lung cancer [C34.90]  Unknown    Closed nondisplaced fracture of posterior arch of first cervical vertebra [S12.031A]  Yes    UTI (urinary tract infection) [N39.0]  Unknown    Acquired hypothyroidism [E03.9]  Yes      Resolved Hospital Problems   No resolved problems to display.       Hospice Qualifying Diagnoses:         Patient has a life expectancy < 6 months due to:  1) Primary Hospice Diagnosis:  Small Cell Lung Cancer  2) Comorbid Conditions Contributing to Decline: Closed nondisplaced fracture of the posterior arch of first cervical vertebra     Vital Signs: Routine per Hospice Protocol.    Code Status: DNR    Allergies:   Review of patient's allergies indicates:   Allergen Reactions    Potassium Nausea Only       Diet: Regular with Boost Plus (strawberry) TID    Activities: As tolerated    Nursing: Per Hospice Routine.     Routine Skin for Bedridden Patients: Apply moisture barrier cream to all skin folds and   wet areas in perineal area daily and after baths and all bowel movements.      Medications     Aneta Almodovar   [place] Medication Instructions LILIANA:[id number]    Printed on:11/04/19 1315   Medication Information                      clonazePAM (KLONOPIN) 0.5 MG tablet  TAKE ONE TABLET BY MOUTH THREE TIMES DAILY AS NEEDED FOR ANXIETY OR for panic attacks             clopidogrel (PLAVIX) 75 mg tablet  Take 1 tablet (75 mg total) by mouth once daily.             levothyroxine (SYNTHROID) 50 MCG tablet  TAKE ONE TABLET BY MOUTH EVERY IN THE MORNING BEFORE BREAKFAST             LORazepam (ATIVAN) 1 MG tablet  Take 1 tablet (1 mg total) by mouth as needed for Anxiety  (Take 30 minutes prior to daily radiation treatment).             ondansetron (ZOFRAN-ODT) 8 MG TbDL  Dissolve 1 tablet (8 mg total) under tongue every 8 (eight) hours as needed (nausea).             oxyCODONE (ROXICODONE) 5 MG immediate release tablet  Take 1 tablet (5 mg total) by mouth every 4 (four) hours as needed.             senna-docusate 8.6-50 mg (PERICOLACE) 8.6-50 mg per tablet  Take 2 tablets by mouth once daily.                 Future Orders:  Hospice Medical Director may dictate new orders for comfortable care measures & sign death certificate.        _________________________________  Tremaine Howe DO  11/04/2019

## 2019-11-04 NOTE — SUBJECTIVE & OBJECTIVE
Interval History:     Patient reports she is doing well. She states she has some lower back pain, but it is manageable at present. She would like to get out of bed and walk. She reports she is hungry and would like to eat her breakfast. Denies any abdominal pain, though she reports intermittent tenderness diffusely.       Oncology Treatment Plan:   OP ATEZOLIZUMAB CARBOPLATIN ETOPOSIDE Q3W    Medications:  Continuous Infusions:  Scheduled Meds:   clonazePAM  0.5 mg Oral TID    cyproheptadine  4 mg Oral QID    fluticasone furoate-vilanterol  1 puff Inhalation Daily    levothyroxine  50 mcg Oral Before breakfast    midodrine  5 mg Oral Daily    pantoprazole  40 mg Oral Daily    sulfamethoxazole-trimethoprim 800-160mg  1 tablet Oral BID     PRN Meds:albuterol, Dextrose 10% Bolus, Dextrose 10% Bolus, glucagon (human recombinant), glucose, glucose, ondansetron, oxyCODONE, senna-docusate 8.6-50 mg, sodium chloride 0.9%, sodium chloride 0.9%, sodium chloride 0.9%     Review of Systems   Constitutional: Negative for fatigue and fever.   HENT: Negative for congestion and rhinorrhea.    Eyes: Negative for photophobia and visual disturbance.   Respiratory: Negative for cough and shortness of breath.    Cardiovascular: Negative for chest pain and leg swelling.   Gastrointestinal: Negative for abdominal distention, abdominal pain, constipation, diarrhea, nausea and vomiting.   Genitourinary: Negative for dysuria.   Musculoskeletal: Positive for back pain. Negative for arthralgias.   Neurological: Negative for facial asymmetry and headaches.     Objective:     Vital Signs (Most Recent):  Temp: 98 °F (36.7 °C) (11/04/19 0708)  Pulse: 106 (11/04/19 0708)  Resp: 16 (11/04/19 0708)  BP: 125/72 (11/04/19 0708)  SpO2: (!) 94 % (11/04/19 0708) Vital Signs (24h Range):  Temp:  [97.5 °F (36.4 °C)-98.8 °F (37.1 °C)] 98 °F (36.7 °C)  Pulse:  [] 106  Resp:  [16-20] 16  SpO2:  [94 %-98 %] 94 %  BP: (115-133)/(64-75) 125/72      Weight: 38.2 kg (84 lb 3.5 oz)  Body mass index is 15.65 kg/m².  Body surface area is 1.29 meters squared.      Intake/Output Summary (Last 24 hours) at 11/4/2019 0836  Last data filed at 11/4/2019 0000  Gross per 24 hour   Intake --   Output 550 ml   Net -550 ml       Physical Exam   Constitutional: She is oriented to person, place, and time. No distress.   Cachetic, frail and in NAD, temporal wasting     HENT:   Head: Normocephalic and atraumatic.   Right Ear: External ear normal.   Left Ear: External ear normal.   Nose: Nose normal.   Mouth/Throat: No oropharyngeal exudate.   C-collar in place  Bruising noted over left occiput      Eyes: Pupils are equal, round, and reactive to light. EOM are normal. No scleral icterus.   Conjunctival pallor   Neck: No tracheal deviation present.   Cardiovascular: Normal rate and intact distal pulses.   Pulses:       Radial pulses are 2+ on the right side, and 2+ on the left side.   Pulmonary/Chest: Effort normal and breath sounds normal. She has no wheezes.   Abdominal: Soft. Bowel sounds are normal. She exhibits no distension. There is no tenderness. There is no guarding.   Musculoskeletal: She exhibits no edema or tenderness.   Sensation is grossly intact.    Neurological: She is alert and oriented to person, place, and time.   Skin: Skin is warm and dry. No rash noted. She is not diaphoretic. No erythema.   Psychiatric: She has a normal mood and affect.   Nursing note and vitals reviewed.      Significant Labs:   None    Diagnostic Results:  None

## 2019-11-04 NOTE — NURSING
Report given to Vashti NEVAREZ at [facility] all questions answered, Per nursing facility Hospice nurse requested that peripheral remain in (20g to right AC) pt to be transferred via EMS will continue to monitor

## 2019-11-04 NOTE — DISCHARGE SUMMARY
[facility]  Hematology/Oncology  Discharge Summary      Patient Name: Rosita Almodovar  MRN: [id number]  Admission Date: 11/1/2019  Hospital Length of Stay: 3 days  Discharge Date and Time:  11/04/2019 1:20 PM  Attending Physician: Reba Alex MD   Discharging Provider: Tremaine Howe DO  Primary Care Provider: Florecita Jameson NP    HPI:   Patient is a 80 yo F with PMHx of extensive stage SCLC s/p 4 cycles of carboplatin etoposide (currently on maintenance atezolizumab) who [place] [facility] [place] with a C1 fracture after falling.  Patient is very hard of hearing.  Patient's  was present for the history and provided much of the detail.  She states that she fell around 2:05am this morning when she got up to use the restroom.  Per the patient's , she lost her balance and hit her head on the door hinge.  He states that she has fallen several times in the last few weeks.  She endorses WL over the last year and a decreased appetite, which has not responded to several appetite stimulant medications.  She denies any pain in her neck at the moment, but she is in a C collar.  She denies any new onset numbness, tingling, headache, weakness, vision changes, chest pain, or shortness of breath.  She recently saw her Oncologist, Dr Atkinson, where she received her last dose of atezolizumab on 10/23/19.  He introduced the conversation of potential hospice in the future for her as she has had limited improvement in her symptoms.      The following oncology hx is per Dr. 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  She was started on carboplatin, etoposide, and atezolizumab.  No focal neurologic deficits and thus WBRT was initially deferred.  Completed 4 cycles of carbo/etopo/atezo 6/7/19-8/21/19.  Was started on WBRT 3Gy/Fx and completed 10Fx for total dose  30Gy 10/1/19-10/16/19.   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  See comment.  Comment:  The left lung biopsy shows a malignant proliferation of cells with increased nuclear cytoplasmic ratio, stippled chromatin pattern with small amount of cytoplasm. Some of cells show molding and crush artifact. The majority of the cells are relatively small in size, however there are a few areas with larger cells within increased amount of cytoplasm. The cells are arranged in sheets and nests with brisk mitotic activity and areas of necrosis. Immunohistochemical stains reveal the tumor cells to stain positively with cytokeratin AE1/AE3, synaptophysin, chromogranin and TTF-1. Immunohistochemical stain for p63 is negative within the tumor cells. Immunostain for cytokeratin 7 shows positive staining in the background lung but is negative within tumor cells. All stains have atisfactory positive negative controls. 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 Correlate clinically.       * No surgery found *     Hospital Course: In the ED, CT of the head and neck was revealing for a C1 nondisplaced fracture.  No other acute abnormalities noted.   CXR was unrevealing for infectious etiology.  UA showed was positive for nitrites and leukocytes, started bactrim. SW spoke  with her and her family today; due to hx of several falls over the last month and 's concern that he would not be able to manage her needs at home, NH placement for hospice would be the best route for her. Patient accepted by [facility] Patient completed Bactrim for UTI on 11/4.     Consults:   Consults (From admission, onward)        Status Ordering Provider     Inpatient consult to Hospice  Once     Provider:  (Not yet assigned)    Acknowledged RIYA MCDONALD     Inpatient consult to Orthopedic Surgery  Once     Provider:  (Not yet assigned)    Completed ABHISHEK SOUZA          Significant Diagnostic Studies: Labs: All labs within the past 24 hours have been reviewed    Pending Diagnostic Studies:     None        Final Active Diagnoses:    Diagnosis Date Noted POA    PRINCIPAL PROBLEM:  Small cell lung cancer [C34.90] 11/01/2019 Unknown    Closed nondisplaced fracture of posterior arch of first cervical vertebra [S12.031A] 11/01/2019 Yes    UTI (urinary tract infection) [N39.0] 11/01/2019 Unknown    Acquired hypothyroidism [E03.9] 05/08/2019 Yes      Problems Resolved During this Admission:      Discharged Condition: stable    Disposition: Hospice/Medical Facility    Follow Up:  Follow-up Information     Stephan Atkinson MD.    Specialty:  Hematology and Oncology  Why:  Follow-Up Appointment as scheduled by the clinic  Contact information:  [address] [place] [address] [id number] [phone]                 Patient Instructions:   No discharge procedures on file.  Medications:  Reconciled Home Medications:      Medication List      START taking these medications    oxyCODONE 5 MG immediate release tablet  Commonly known as:  ROXICODONE  Take 1 tablet (5 mg total) by mouth every 4 (four) hours as needed.        CONTINUE taking these medications    clonazePAM 0.5 MG tablet  Commonly known as:  KLONOPIN  TAKE ONE TABLET BY MOUTH THREE TIMES DAILY AS NEEDED FOR ANXIETY OR for panic attacks      clopidogrel 75 mg tablet  Commonly known as:  PLAVIX  Take 1 tablet (75 mg total) by mouth once daily.     levothyroxine 50 MCG tablet  Commonly known as:  SYNTHROID  TAKE ONE TABLET BY MOUTH EVERY IN THE MORNING BEFORE BREAKFAST     LORazepam 1 MG tablet  Commonly known as:  ATIVAN  Take 1 tablet (1 mg total) by mouth as needed for Anxiety (Take 30 minutes prior to daily radiation treatment).     ondansetron 8 MG Tbdl  Commonly known as:  ZOFRAN-ODT  Dissolve 1 tablet (8 mg total) under tongue every 8 (eight) hours as needed (nausea).     senna-docusate 8.6-50 mg 8.6-50 mg per tablet  Commonly known as:  PERICOLACE  Take 2 tablets by mouth once daily.        STOP taking these medications    atenolol 50 MG tablet  Commonly known as:  TENORMIN     cyproheptadine 4 mg tablet  Commonly known as:  PERIACTIN     DECARA 50,000 unit capsule  Generic drug:  cholecalciferol (vitamin D3)     fluticasone-umeclidin-vilanter 100-62.5-25 mcg Dsdv  Commonly known as:  TRELEGY ELLIPTA     furosemide 20 MG tablet  Commonly known as:  LASIX     lidocaine-prilocaine cream  Commonly known as:  EMLA     lisinopril 5 MG tablet  Commonly known as:  PRINIVIL,ZESTRIL     midodrine 5 MG Tab  Commonly known as:  PROAMATINE     pantoprazole 40 MG tablet  Commonly known as:  PROTONIX     pravastatin 20 MG tablet  Commonly known as:  PRAVACHOL     predniSONE 20 MG tablet  Commonly known as:  DELTASONE     sotalol 80 MG tablet  Commonly known as:  BETAPACE     tiotropium bromide 2.5 mcg/actuation Mist  Commonly known as:  SPIRIVA RESPIMAT     traMADol 50 mg tablet  Commonly known as:  ULTRAM     VENTOLIN HFA 90 mcg/actuation inhaler  Generic drug:  albuterol            Tremaine Howe DO  Hematology/Oncology  [facility]

## 2019-11-04 NOTE — PROGRESS NOTES
Hospice and d/c orders uploaded via [employer] to [facility].  S/w pts assigned nurse Alberta [id number] updating her on d/c plan.  Awaiting confirmation/time to transport patient and call report.  Will update Alberta as known.  Pt/ aware and in agreement with plan.  No other needs identified at this time.

## 2019-11-04 NOTE — ASSESSMENT & PLAN NOTE
UA (+) for nitrites, leukocytes, and bacteria.   - Continue PO bactrim BID for 5 days  - Likely contributing to her urinary rentention. Will hold off on dinero placement for now.  Continue bladder scans with intermittent catheterization q6 hours

## 2019-11-04 NOTE — PLAN OF CARE
Patient discharging to [facility] for inpatient hospice services. No follow-up appointment scheduled secondary to discharge disposition.  assisting the patient and the patient's family with inpatient hospice placement.     11/04/19 1325   Final Note   Assessment Type Final Discharge Note   Anticipated Discharge Disposition [facility]  (Inpatient Service- [facility] )   What phone number can be called within the next 1-3 days to see how you are doing after discharge?   ([phone])   Hospital Follow Up  Appt(s) scheduled?   (N/A)   Discharge plans and expectations educations in teach back method with documentation complete?   (N/A)

## 2019-11-04 NOTE — PROGRESS NOTES
Per [facility]/Yulia torres to accept patient to inpatient unit [facility].  Pt's assigned nurse Alberta agreed to call report to [phone].  Stretcher transport set for 4:15 pm.  All parties aware and in agreement with discharge plan.

## 2019-11-04 NOTE — PROGRESS NOTES
SW visit bedside for discharge planning needs.  Pt resting in hospital bed,  at bedside.  Discussed hospice services and placement concerns.  Based on discussion will contact [facility] for pt to have informational visit regarding hospice services and levels of care.  Reached out to Yulia with [facility] and submitted referral documentation to [facility] for review.  Pt/ in agreement with plan.  Contact information for the SWer provided.  Pt/ agreed to contact  as needed if questions/concerns or need for support.  Dr. Abi antony.

## 2019-11-12 NOTE — PHYSICIAN QUERY
PT Name: Rosita Almodovar  MR #: [id number]    Physician Query Form - Consultant Diagnosis Clarification     CDS: Rhiannon Byrne RN  Contact information: [email or web address]  This form is a permanent document in the medical record.     Query Date: November 12, 2019  By submitting this query, we are merely seeking further clarification of documentation.  Please utilize your independent clinical judgment when addressing the question(s) below.  The Medical record contains the following:   Diagnosis Supporting Clinical Information Location in Medical Record   severe malnutrition       Severe malnutrition in the context of Chronic Illness/Injury  Related to (etiology):  Poor appetite, increased nutrient needs 2/2 small cell lung cancer  Signs and Symptoms (as evidenced by):  Energy Intake: <50% of estimated energy requirement for 6 months  Body Fat Depletion: severe depletion of triceps, moderate depletion of orbitals   Muscle Mass Depletion: severe depletion of temples, interosseous muscle and lower extremities   Weight Loss: 40% x 9 months  Interventions (treatment strategy):  Collaboration of care with other providers  Commercial beverage - Boost Plus TID      Weight (lb): 84.22 lb  Ideal Body Weight (IBW), Female: 107.5 lb  BMI (Calculated): 15.7       Nutrition PN 11/4     Do you agree with the Consultants diagnosis of severe malnutrition?    [  x] Yes   [  ] No   [  ] Other/Clarification of findings:   [  ] Clinically undetermined

## 2020-06-17 NOTE — TELEPHONE ENCOUNTER
Returned call patient states she is not coming today because of the bad weather. She states she will be here tomorrow.        ----- Message from Katherine Dos Santos sent at 6/6/2019 11:11 AM CDT -----  Contact: pt   Pt called to reschedule appt 6/6 for Labs due to weather   Callback#[phone]  Thank You  EDWIN Dos Santos      Statement Selected

## 2020-11-10 NOTE — SUBJECTIVE & OBJECTIVE
Form placed in Dr. Marybeth nielsen for review and signature Oncology Treatment Plan:   [No treatment plan]    Medications:  Continuous Infusions:  Scheduled Meds:   atenolol  50 mg Oral Daily    fluticasone furoate-vilanterol  1 puff Inhalation Daily    levothyroxine  75 mcg Oral Before breakfast    pantoprazole  40 mg Oral Daily    pravastatin  20 mg Oral Daily    tiotropium  1 capsule Inhalation Daily     PRN Meds:acetaminophen, albuterol-ipratropium, clonazePAM, dextrose 50%, dextrose 50%, glucagon (human recombinant), glucose, glucose, omnipaque, ondansetron, sodium chloride 0.9%, traMADol     Review of patient's allergies indicates:  No Known Allergies     Past Medical History:   Diagnosis Date    Acquired hypothyroidism 2019    COPD (chronic obstructive pulmonary disease) 2019     History reviewed. No pertinent surgical history.  Family History     None        Tobacco Use    Smoking status: Former Smoker     Packs/day: 1.00     Years: 54.00     Pack years: 54.00     Types: Cigarettes     Last attempt to quit:      Years since quittin.3    Smokeless tobacco: Never Used   Substance and Sexual Activity    Alcohol use: No     Frequency: Never    Drug use: No    Sexual activity: Yes     Partners: Male       Review of Systems   Constitutional: Positive for unexpected weight change.   HENT: Negative.    Eyes: Negative.    Respiratory: Negative.    Cardiovascular: Negative.    Gastrointestinal: Negative.    Endocrine: Negative.    Genitourinary: Negative.    Musculoskeletal: Negative.    Allergic/Immunologic: Negative.    Neurological: Positive for dizziness, speech difficulty and weakness.   Hematological: Negative.    Psychiatric/Behavioral: Negative.      Objective:     Vital Signs (Most Recent):  Temp: 97.9 °F (36.6 °C) (19)  Pulse: 78 (19)  Resp: 18 (19)  BP: 128/60 (19)  SpO2: 95 % (19) Vital Signs (24h Range):  Temp:  [97.2 °F (36.2 °C)-97.9 °F (36.6 °C)] 97.9 °F (36.6 °C)  Pulse:   [74-82] 78  Resp:  [18-20] 18  SpO2:  [94 %-97 %] 95 %  BP: (125-141)/(60-70) 128/60     Weight: 59 kg (130 lb)  Body mass index is 24.17 kg/m².  Body surface area is 1.6 meters squared.      Intake/Output Summary (Last 24 hours) at 5/11/2019 1558  Last data filed at 5/10/2019 2300  Gross per 24 hour   Intake --   Output 250 ml   Net -250 ml       Physical Exam   Constitutional: She is oriented to person, place, and time. She appears well-developed.   HENT:   Head: Normocephalic.   Cardiovascular: Normal rate, regular rhythm and normal heart sounds.   No murmur heard.  Pulmonary/Chest: Effort normal. No respiratory distress. She has no wheezes. She has no rales.   Diminished breath sounds left lung fields   Abdominal: Soft. Bowel sounds are normal. She exhibits no distension. There is no tenderness.   Musculoskeletal: Normal range of motion.   Neurological: She is alert and oriented to person, place, and time. No sensory deficit. She exhibits normal muscle tone. Coordination normal.   Skin: Skin is warm and dry.   Psychiatric: She has a normal mood and affect. Her behavior is normal.       Significant Labs:   CBC:   Recent Labs   Lab 05/10/19  0504 05/11/19  0315   WBC 7.86 7.15   HGB 11.7* 11.4*   HCT 36.0* 34.7*   * 368*    and CMP:   Recent Labs   Lab 05/10/19  0504 05/11/19  0315    137   K 3.3* 3.5    103   CO2 25 23   GLU 74 61*   BUN 13 8   CREATININE 1.0 0.7   CALCIUM 9.2 8.8   PROT 5.7* 5.2*   ALBUMIN 2.8* 2.5*   BILITOT 0.3 0.3   ALKPHOS 120 116   AST 28 24   ALT 6* 7*   ANIONGAP 11 11   EGFRNONAA 54.5* >60.0       Diagnostic Results:  I have reviewed all pertinent imaging results/findings within the past 24 hours.     5/10/19 CT chest abdomen pelvis  FINDINGS:  Neck base:Unremarkable.    Chest wall/axilla:Unremarkable.    Lungs/pleura/morgan: Mild emphysematous changes.  The left major bronchus is patent.  6.2 cm ill-defined heterogeneous left hilar mass occluding the left upper and lower  lobar bronchi, encasing and narrowing the pulmonary vasculature.  There is associated collapse of the left lung and mild left-sided pleural effusion.    2-3 mm nodules in the right upper lobe (series 2, image 23 and image 30).  No right-sided pleural effusion.  No pneumothorax.    Heart/pericardium/mediastinum: Leftward shifting of the mediastinum.  Heart is normal size.  No pericardial effusion.  Coronary artery atherosclerotic calcification.    Abdominal wall: Injection sites in the anterior abdominal wall.    Liver: Normal in size and contour.  No focal lesion.    Gallbladder/bile ducts: 1.5 cm gallstone without evidence of cholecystitis.  No intra or extrahepatic biliary ductal dilatation.    Spleen and pancreas are unremarkable.  New 2.2 soft tissue nodule in the left adrenal gland, consistent with metastases.  Right adrenal gland is unremarkable..    Kidneys/ureters: Normal in size and location.  Several bilateral renal cysts.  No hydronephrosis or ureteral dilatation.    Urinary bladder: Partially distended with smooth contour.    Reproductive: Status post hysterectomy.    Bowel/mesentery: Hiatal hernia.  No evidence of bowel obstruction or inflammation.  Diverticulosis coli without evidence of diverticulitis.    Peritoneal/retroperitoneum: No free intraperitoneal air or fluid.    Vasculature: 3 vessels left aortic arch.  Aortoiliac atherosclerotic calcification as well as at the origin of the celiac and superior mesenteric arteries.  Aorta is normal in course and caliber without aneurysmal dilatation, noting the ascending aorta is upper normal in caliber.  Portal, splenic and superior mesenteric veins are patent.  Note is made of retroaortic left renal vein.    Bones: Degenerative change of the spine with multilevel vacuum phenomena.  Mild dextroscoliosis of the lumbar spine.  Sclerotic focus in the right pubic symphysis, likely representing degenerative changes.      Impression       In this patient with  history of brain metastasis, there is large left hilar mass occluding the left upper and lower lobar bronchi and narrowing the pulmonary vessels, consistent with lung cancer.  There is associated collapse of the left lung and leftward mediastinal shift.    New nodule in the left adrenal gland, consistent with metastases.    Scattered subcentimeter new nodules in the right lung, possibly representing metastasis or infection.    Hiatal hernia.    Bilateral renal cysts.    Cholelithiasis without evidence of cholecystitis.    Additional findings as above.    RECIST SUMMARY:    Date of prior examination for comparison: No prior    Lesion 1: Left hilar.  6.2 cm. Series 2 image 32.  No prior measurements.    Lesion 2: Left adrenal.  2.2 cm. Series 2 image 81.  No prior measurements.    This report was flagged in Epic as abnormal.

## 2021-09-14 NOTE — TELEPHONE ENCOUNTER
----- Message from Toña Miller sent at 10/14/2019  3:56 PM CDT -----  Contact: Cheri with [facility] phone [phone]  Type:  Patient Returning Call    Who Called:  Cheri with [facility]  Who Left Message for Patient:  Uma  Does the patient know what this is regarding?:  Low blood pressure  Best Call Back Number:  [phone]  Additional Information:  Missed your call, please call her back. Thanks.    
Spoke with Cheri, verbalized that the patient has not been seen by Dr. Mcintosh > 2 years. I have no recent records on the patient and I am unsure of who is filling her BP medication. Verbalized Florecita Jameson NP is listed as patient's PCP. Cheri verbalized understanding.   
n/a

## (undated) DEVICE — ELECTRODE REM PLYHSV RETURN 9

## (undated) DEVICE — DRAPE C ARM 42 X 120 10/BX

## (undated) DEVICE — ADHESIVE DERMABOND ADVANCED

## (undated) DEVICE — SEE MEDLINE ITEM 157131

## (undated) DEVICE — SUT MONOCRYL 4-0 PS-2

## (undated) DEVICE — DRAPE THYROID WITH ARMBOARD

## (undated) DEVICE — TRAY MINOR GEN SURG

## (undated) DEVICE — SOL NACL 0.9% INJ PF/50151